# Patient Record
Sex: MALE | Race: WHITE | Employment: OTHER | URBAN - METROPOLITAN AREA
[De-identification: names, ages, dates, MRNs, and addresses within clinical notes are randomized per-mention and may not be internally consistent; named-entity substitution may affect disease eponyms.]

---

## 2018-05-07 ENCOUNTER — HOSPITAL ENCOUNTER (EMERGENCY)
Age: 78
Discharge: HOME | End: 2018-05-08
Payer: MEDICARE

## 2018-05-07 ENCOUNTER — APPOINTMENT (OUTPATIENT)
Dept: CT IMAGING | Age: 78
End: 2018-05-07
Payer: MEDICARE

## 2018-05-07 VITALS
WEIGHT: 182 LBS | TEMPERATURE: 97.8 F | OXYGEN SATURATION: 95 % | DIASTOLIC BLOOD PRESSURE: 87 MMHG | HEART RATE: 99 BPM | BODY MASS INDEX: 26.05 KG/M2 | RESPIRATION RATE: 16 BRPM | SYSTOLIC BLOOD PRESSURE: 118 MMHG | HEIGHT: 70 IN

## 2018-05-07 DIAGNOSIS — M54.32 SCIATICA OF LEFT SIDE: ICD-10-CM

## 2018-05-07 DIAGNOSIS — M51.9 LUMBAR DISC DISEASE: ICD-10-CM

## 2018-05-07 DIAGNOSIS — S39.012A STRAIN OF LUMBAR REGION, INITIAL ENCOUNTER: Primary | ICD-10-CM

## 2018-05-07 PROCEDURE — 96372 THER/PROPH/DIAG INJ SC/IM: CPT

## 2018-05-07 PROCEDURE — 99283 EMERGENCY DEPT VISIT LOW MDM: CPT

## 2018-05-07 PROCEDURE — 72131 CT LUMBAR SPINE W/O DYE: CPT

## 2018-05-07 PROCEDURE — 6360000002 HC RX W HCPCS: Performed by: NURSE PRACTITIONER

## 2018-05-07 RX ORDER — OXYCODONE HYDROCHLORIDE AND ACETAMINOPHEN 5; 325 MG/1; MG/1
1 TABLET ORAL EVERY 6 HOURS PRN
Qty: 10 TABLET | Refills: 0 | Status: SHIPPED | OUTPATIENT
Start: 2018-05-07 | End: 2018-05-10

## 2018-05-07 RX ORDER — ORPHENADRINE CITRATE 30 MG/ML
60 INJECTION INTRAMUSCULAR; INTRAVENOUS ONCE
Status: COMPLETED | OUTPATIENT
Start: 2018-05-07 | End: 2018-05-07

## 2018-05-07 RX ORDER — ORPHENADRINE CITRATE 100 MG/1
100 TABLET, EXTENDED RELEASE ORAL 2 TIMES DAILY
Qty: 20 TABLET | Refills: 0 | Status: SHIPPED | OUTPATIENT
Start: 2018-05-07 | End: 2018-05-17

## 2018-05-07 RX ORDER — NAPROXEN 500 MG/1
500 TABLET ORAL 2 TIMES DAILY PRN
Qty: 28 TABLET | Refills: 0 | Status: SHIPPED | OUTPATIENT
Start: 2018-05-07 | End: 2019-11-19

## 2018-05-07 RX ORDER — PREDNISONE 20 MG/1
TABLET ORAL
Qty: 18 TABLET | Refills: 0 | Status: SHIPPED | OUTPATIENT
Start: 2018-05-07 | End: 2018-05-17

## 2018-05-07 RX ADMIN — HYDROMORPHONE HYDROCHLORIDE 1 MG: 1 INJECTION, SOLUTION INTRAMUSCULAR; INTRAVENOUS; SUBCUTANEOUS at 23:12

## 2018-05-07 RX ADMIN — ORPHENADRINE CITRATE 60 MG: 30 INJECTION INTRAMUSCULAR; INTRAVENOUS at 23:15

## 2018-05-07 ASSESSMENT — PAIN SCALES - GENERAL
PAINLEVEL_OUTOF10: 10
PAINLEVEL_OUTOF10: 6

## 2018-05-07 ASSESSMENT — PAIN DESCRIPTION - ORIENTATION: ORIENTATION: LOWER

## 2018-05-07 ASSESSMENT — PAIN DESCRIPTION - LOCATION: LOCATION: BACK

## 2018-05-07 ASSESSMENT — PAIN DESCRIPTION - PAIN TYPE: TYPE: ACUTE PAIN

## 2019-11-19 ENCOUNTER — HOSPITAL ENCOUNTER (OUTPATIENT)
Age: 79
Discharge: HOME | End: 2019-11-21
Payer: MEDICARE

## 2019-11-19 DIAGNOSIS — E78.01 FAMILIAL HYPERCHOLESTEROLEMIA: ICD-10-CM

## 2019-11-19 DIAGNOSIS — Z12.5 SPECIAL SCREENING FOR MALIGNANT NEOPLASM OF PROSTATE: ICD-10-CM

## 2019-11-19 DIAGNOSIS — R73.9 HYPERGLYCEMIA: ICD-10-CM

## 2019-11-19 DIAGNOSIS — E55.9 VITAMIN D DEFICIENCY: ICD-10-CM

## 2019-11-19 DIAGNOSIS — D50.0 IRON DEFICIENCY ANEMIA SECONDARY TO BLOOD LOSS (CHRONIC): ICD-10-CM

## 2019-11-19 DIAGNOSIS — E03.9 PRIMARY HYPOTHYROIDISM: ICD-10-CM

## 2019-11-19 LAB
ALBUMIN SERPL-MCNC: 4 G/DL (ref 3.5–5.2)
ALP BLD-CCNC: 87 U/L (ref 40–129)
ALT SERPL-CCNC: 14 U/L (ref 0–40)
ANION GAP SERPL CALCULATED.3IONS-SCNC: 12 MMOL/L (ref 7–16)
AST SERPL-CCNC: 17 U/L (ref 0–39)
BILIRUB SERPL-MCNC: 0.9 MG/DL (ref 0–1.2)
BUN BLDV-MCNC: 14 MG/DL (ref 8–23)
CALCIUM SERPL-MCNC: 9.3 MG/DL (ref 8.6–10.2)
CHLORIDE BLD-SCNC: 102 MMOL/L (ref 98–107)
CHOLESTEROL, TOTAL: 206 MG/DL (ref 0–199)
CO2: 27 MMOL/L (ref 22–29)
CREAT SERPL-MCNC: 1.3 MG/DL (ref 0.7–1.2)
GFR AFRICAN AMERICAN: >60
GFR NON-AFRICAN AMERICAN: 53 ML/MIN/1.73
GLUCOSE BLD-MCNC: 66 MG/DL (ref 74–99)
HBA1C MFR BLD: 5.7 % (ref 4–5.6)
HCT VFR BLD CALC: 52.3 % (ref 37–54)
HDLC SERPL-MCNC: 50 MG/DL
HEMOGLOBIN: 16.2 G/DL (ref 12.5–16.5)
LDL CHOLESTEROL CALCULATED: 134 MG/DL (ref 0–99)
MCH RBC QN AUTO: 30.5 PG (ref 26–35)
MCHC RBC AUTO-ENTMCNC: 31 % (ref 32–34.5)
MCV RBC AUTO: 98.3 FL (ref 80–99.9)
PDW BLD-RTO: 13.9 FL (ref 11.5–15)
PLATELET # BLD: 239 E9/L (ref 130–450)
PMV BLD AUTO: 10.4 FL (ref 7–12)
POTASSIUM SERPL-SCNC: 4.6 MMOL/L (ref 3.5–5)
PROSTATE SPECIFIC ANTIGEN: 1.61 NG/ML (ref 0–4)
RBC # BLD: 5.32 E12/L (ref 3.8–5.8)
SODIUM BLD-SCNC: 141 MMOL/L (ref 132–146)
TOTAL PROTEIN: 7.7 G/DL (ref 6.4–8.3)
TRIGL SERPL-MCNC: 108 MG/DL (ref 0–149)
TSH SERPL DL<=0.05 MIU/L-ACNC: 3.51 UIU/ML (ref 0.27–4.2)
VITAMIN D 25-HYDROXY: 57 NG/ML (ref 30–100)
VLDLC SERPL CALC-MCNC: 22 MG/DL
WBC # BLD: 8.7 E9/L (ref 4.5–11.5)

## 2019-11-19 PROCEDURE — 82306 VITAMIN D 25 HYDROXY: CPT

## 2019-11-19 PROCEDURE — 80053 COMPREHEN METABOLIC PANEL: CPT

## 2019-11-19 PROCEDURE — 80061 LIPID PANEL: CPT

## 2019-11-19 PROCEDURE — G0103 PSA SCREENING: HCPCS

## 2019-11-19 PROCEDURE — 84443 ASSAY THYROID STIM HORMONE: CPT

## 2019-11-19 PROCEDURE — 85027 COMPLETE CBC AUTOMATED: CPT

## 2019-11-19 PROCEDURE — 36415 COLL VENOUS BLD VENIPUNCTURE: CPT

## 2019-11-19 PROCEDURE — 83036 HEMOGLOBIN GLYCOSYLATED A1C: CPT

## 2020-02-11 ENCOUNTER — HOSPITAL ENCOUNTER (OUTPATIENT)
Dept: NON INVASIVE DIAGNOSTICS | Age: 80
Discharge: HOME | End: 2020-02-11
Payer: MEDICARE

## 2020-02-11 ENCOUNTER — HOSPITAL ENCOUNTER (OUTPATIENT)
Dept: CT IMAGING | Age: 80
Discharge: HOME | End: 2020-02-11
Payer: MEDICARE

## 2020-02-11 LAB
BUN BLDV-MCNC: 32 MG/DL (ref 8–23)
CREAT SERPL-MCNC: 1.9 MG/DL (ref 0.7–1.2)
GFR AFRICAN AMERICAN: 41
GFR NON-AFRICAN AMERICAN: 34 ML/MIN/1.73
LV EF: 60 %
LVEF MODALITY: NORMAL

## 2020-02-11 PROCEDURE — 71250 CT THORAX DX C-: CPT

## 2020-02-11 PROCEDURE — 93306 TTE W/DOPPLER COMPLETE: CPT

## 2020-02-11 PROCEDURE — 36415 COLL VENOUS BLD VENIPUNCTURE: CPT

## 2020-02-11 PROCEDURE — 84520 ASSAY OF UREA NITROGEN: CPT

## 2020-02-11 PROCEDURE — 82565 ASSAY OF CREATININE: CPT

## 2020-05-14 ENCOUNTER — OFFICE VISIT (OUTPATIENT)
Dept: ORTHOPEDIC SURGERY | Age: 80
End: 2020-05-14
Payer: MEDICARE

## 2020-05-14 VITALS — BODY MASS INDEX: 26.6 KG/M2 | HEIGHT: 71 IN | WEIGHT: 190 LBS | TEMPERATURE: 98 F

## 2020-05-14 PROCEDURE — G8427 DOCREV CUR MEDS BY ELIG CLIN: HCPCS | Performed by: ORTHOPAEDIC SURGERY

## 2020-05-14 PROCEDURE — 99204 OFFICE O/P NEW MOD 45 MIN: CPT | Performed by: ORTHOPAEDIC SURGERY

## 2020-05-14 PROCEDURE — 20610 DRAIN/INJ JOINT/BURSA W/O US: CPT | Performed by: ORTHOPAEDIC SURGERY

## 2020-05-14 PROCEDURE — 4004F PT TOBACCO SCREEN RCVD TLK: CPT | Performed by: ORTHOPAEDIC SURGERY

## 2020-05-14 PROCEDURE — G8417 CALC BMI ABV UP PARAM F/U: HCPCS | Performed by: ORTHOPAEDIC SURGERY

## 2020-05-14 PROCEDURE — 4040F PNEUMOC VAC/ADMIN/RCVD: CPT | Performed by: ORTHOPAEDIC SURGERY

## 2020-05-14 PROCEDURE — 1123F ACP DISCUSS/DSCN MKR DOCD: CPT | Performed by: ORTHOPAEDIC SURGERY

## 2020-05-14 RX ORDER — IPRATROPIUM BROMIDE AND ALBUTEROL SULFATE 2.5; .5 MG/3ML; MG/3ML
SOLUTION RESPIRATORY (INHALATION)
COMMUNITY
Start: 2020-05-05 | End: 2020-08-24 | Stop reason: ALTCHOICE

## 2020-05-14 RX ORDER — TRIAMCINOLONE ACETONIDE 40 MG/ML
40 INJECTION, SUSPENSION INTRA-ARTICULAR; INTRAMUSCULAR ONCE
Status: COMPLETED | OUTPATIENT
Start: 2020-05-14 | End: 2020-05-14

## 2020-05-14 RX ORDER — BUDESONIDE AND FORMOTEROL FUMARATE DIHYDRATE 160; 4.5 UG/1; UG/1
2 AEROSOL RESPIRATORY (INHALATION) 2 TIMES DAILY
COMMUNITY
End: 2020-08-24 | Stop reason: ALTCHOICE

## 2020-05-14 RX ORDER — LISINOPRIL AND HYDROCHLOROTHIAZIDE 20; 12.5 MG/1; MG/1
TABLET ORAL
COMMUNITY
Start: 2020-03-01 | End: 2020-09-01 | Stop reason: SDUPTHER

## 2020-05-14 RX ORDER — ALBUTEROL SULFATE 2.5 MG/3ML
SOLUTION RESPIRATORY (INHALATION)
Status: ON HOLD | COMMUNITY
Start: 2020-02-27 | End: 2022-01-01

## 2020-05-14 RX ADMIN — TRIAMCINOLONE ACETONIDE 40 MG: 40 INJECTION, SUSPENSION INTRA-ARTICULAR; INTRAMUSCULAR at 11:50

## 2020-05-14 NOTE — PROGRESS NOTES
Chief Complaint   Patient presents with    Shoulder Pain     Bilateral Shoulder x 2 years R>L, no known injury, no previous shoulder surgeries        Sarath Harris is a [de-identified]y.o. year old   male who is seen today  for evaluation of bilateral shoulder pain. He reports the pain has been ongoing for the past 2 years. He does not recall a specific injury which started the pain. \.   He reports the pain is worse with overhead movement, better with rest.  The patient does have mechanical symptoms. Hedoes have night pain. He denies a feeling of instability. The prior treatments have been none. The patient   has not responded to the treatment. The patient is right hand dominant. The patient is not working. The patients occupation is retired. Chief Complaint   Patient presents with    Shoulder Pain     Bilateral Shoulder x 2 years R>L, no known injury, no previous shoulder surgeries     No past medical history on file. No past surgical history on file.     Current Outpatient Medications:     ipratropium-albuterol (DUONEB) 0.5-2.5 (3) MG/3ML SOLN nebulizer solution, INHALE 1 UNIT DOSE VIAL 4 TIMES DAILY VIA NEBULIZER MACHINE AS NEEDED FOR COUGHING  WHEEZING OR SHORTNESS OF BREATH, Disp: , Rfl:     lisinopril-hydroCHLOROthiazide (PRINZIDE;ZESTORETIC) 20-12.5 MG per tablet, TAKE ONE TABLET BY MOUTH DAILY, Disp: , Rfl:     albuterol (PROVENTIL) (2.5 MG/3ML) 0.083% nebulizer solution, INHALE THE CONTENTS OF 1 VIAL (3 ML) VIA NEBULIZER 4 TIMES DAILY AS NEEDED WHEN COUGHING  WHEEZING  OR SHORT OF BREATH, Disp: , Rfl:     budesonide-formoterol (SYMBICORT) 160-4.5 MCG/ACT AERO, Inhale 2 puffs into the lungs 2 times daily, Disp: , Rfl:   Allergies   Allergen Reactions    Penicillins      Social History     Socioeconomic History    Marital status:      Spouse name: Not on file    Number of children: Not on file    Years of education: Not on file    Highest education level: Not on file   Occupational

## 2020-05-18 ENCOUNTER — TELEPHONE (OUTPATIENT)
Dept: ORTHOPEDIC SURGERY | Age: 80
End: 2020-05-18

## 2020-07-07 ENCOUNTER — OFFICE VISIT (OUTPATIENT)
Dept: ORTHOPEDIC SURGERY | Age: 80
End: 2020-07-07
Payer: MEDICARE

## 2020-07-07 VITALS — BODY MASS INDEX: 25.62 KG/M2 | HEIGHT: 71 IN | WEIGHT: 183 LBS | TEMPERATURE: 98 F

## 2020-07-07 PROCEDURE — 99213 OFFICE O/P EST LOW 20 MIN: CPT | Performed by: ORTHOPAEDIC SURGERY

## 2020-07-07 PROCEDURE — 4040F PNEUMOC VAC/ADMIN/RCVD: CPT | Performed by: ORTHOPAEDIC SURGERY

## 2020-07-07 PROCEDURE — 1123F ACP DISCUSS/DSCN MKR DOCD: CPT | Performed by: ORTHOPAEDIC SURGERY

## 2020-07-07 PROCEDURE — G8427 DOCREV CUR MEDS BY ELIG CLIN: HCPCS | Performed by: ORTHOPAEDIC SURGERY

## 2020-07-07 PROCEDURE — 1036F TOBACCO NON-USER: CPT | Performed by: ORTHOPAEDIC SURGERY

## 2020-07-07 PROCEDURE — G8417 CALC BMI ABV UP PARAM F/U: HCPCS | Performed by: ORTHOPAEDIC SURGERY

## 2020-07-07 NOTE — PROGRESS NOTES
Chief Complaint   Patient presents with    Shoulder Pain     Right Shoulder F/U, had cortisone injection on 5/14/2020 with good relief. Teresa Huizar is a [de-identified]y.o. year old   male who is seen today  for follow up of his right shoulder. He has pain with over head movement. The cortisone injection lasted 3-4 weeks. The patient is on 2L of O2 during the day. Chief Complaint   Patient presents with    Shoulder Pain     Right Shoulder F/U, had cortisone injection on 5/14/2020 with good relief. No past medical history on file. No past surgical history on file.     Current Outpatient Medications:     lisinopril-hydroCHLOROthiazide (PRINZIDE;ZESTORETIC) 20-12.5 MG per tablet, Take 1 tablet by mouth daily, Disp: 30 tablet, Rfl: 2    ipratropium-albuterol (DUONEB) 0.5-2.5 (3) MG/3ML SOLN nebulizer solution, INHALE 1 UNIT DOSE VIAL 4 TIMES DAILY VIA NEBULIZER MACHINE AS NEEDED FOR COUGHING  WHEEZING OR SHORTNESS OF BREATH, Disp: , Rfl:     lisinopril-hydroCHLOROthiazide (PRINZIDE;ZESTORETIC) 20-12.5 MG per tablet, TAKE ONE TABLET BY MOUTH DAILY, Disp: , Rfl:     albuterol (PROVENTIL) (2.5 MG/3ML) 0.083% nebulizer solution, INHALE THE CONTENTS OF 1 VIAL (3 ML) VIA NEBULIZER 4 TIMES DAILY AS NEEDED WHEN COUGHING  WHEEZING  OR SHORT OF BREATH, Disp: , Rfl:     budesonide-formoterol (SYMBICORT) 160-4.5 MCG/ACT AERO, Inhale 2 puffs into the lungs 2 times daily, Disp: , Rfl:     albuterol sulfate  (90 Base) MCG/ACT inhaler, Inhale 2 puffs into the lungs 4 times daily as needed for Wheezing, Disp: 1 Inhaler, Rfl: 5    budesonide-formoterol (SYMBICORT) 160-4.5 MCG/ACT AERO, Inhale 2 puffs into the lungs 2 times daily, Disp: 3 Inhaler, Rfl: 1    tiotropium (SPIRIVA HANDIHALER) 18 MCG inhalation capsule, Inhale 1 capsule into the lungs daily, Disp: 30 capsule, Rfl: 2  Allergies   Allergen Reactions    Penicillins Hives     Long ago     Social History     Socioeconomic History    Marital status:      Spouse name: Not on file    Number of children: Not on file    Years of education: Not on file    Highest education level: Not on file   Occupational History    Not on file   Social Needs    Financial resource strain: Not on file    Food insecurity     Worry: Not on file     Inability: Not on file    Transportation needs     Medical: Not on file     Non-medical: Not on file   Tobacco Use    Smoking status: Former Smoker     Last attempt to quit: 11/19/2016     Years since quitting: 3.6    Smokeless tobacco: Never Used   Substance and Sexual Activity    Alcohol use: No    Drug use: No    Sexual activity: Not on file   Lifestyle    Physical activity     Days per week: Not on file     Minutes per session: Not on file    Stress: Not on file   Relationships    Social connections     Talks on phone: Not on file     Gets together: Not on file     Attends Orthodox service: Not on file     Active member of club or organization: Not on file     Attends meetings of clubs or organizations: Not on file     Relationship status: Not on file    Intimate partner violence     Fear of current or ex partner: Not on file     Emotionally abused: Not on file     Physically abused: Not on file     Forced sexual activity: Not on file   Other Topics Concern    Not on file   Social History Narrative    Not on file     No family history on file. REVIEW OF SYSTEMS:     General/Constitution:  (-)weight loss, (-)fever, (-)chills, (-)weakness. Skin: (-) rash,(-) psoriasis,(-) eczema, (-)skin cancer. Musculoskeletal: (-) fractures,  (-) dislocations,(-) collagen vascular disease, (-) fibromyalgia, (-) multiple sclerosis, (-) muscular dystrophy, (-) RSD,(-) joint pain (-)swelling, (-) joint pain,swelling. Neurologic: (-) epilepsy, (-)seizures,(-) brain tumor,(-) TIA, (-)stroke, (-)headaches, (-)Parkinson disease,(-) memory loss, (-) LOC.   Cardiovascular: (-) Chest pain, (-) swelling in legs/feet, (-) SOB, (-) cramping in legs/feet with walking. Respiratory: (-) SOB, (-) Coughing, (-) night sweats. GI: (-) nausea, (-) vomiting, (-) diarrhea, (-) blood in stool, (-) gastric ulcer. Psychiatric: (-) Depression, (-) Anxiety, (-) bipolar disease, (-) Alzheimer's Disease  Allergic/Immunologic: (-) allergies latex, (-) allergies metal, (-) skin sensitivity. Hematlogic: (-) anemia, (-) blood transfusion, (-) DVT/PE, (-) Clotting disorders      Subjective:    Constitution:  Temp 98 °F (36.7 °C)   Ht 5' 10.5\" (1.791 m)   Wt 183 lb (83 kg)   BMI 25.89 kg/m²     Psycihatric:  The patient is alert and oriented x 3, appears to be stated age and in no distress. Respiratory:  Respiratory effort is not labored. Patient is not gasping. Palpation of the chest reveals no tactile fremitus. Skin:  Upon inspection: the skin appears warm, dry and intact. There is not a previous scar over the affected area. There is not any cellulitis, lymphedema or cutaneous lesions noted in the lower extremities. Upon palpation there is no induration noted. Neurologic:  Motor exam of the upper extremities show: The reflexes in biceps/triceps/brachioradialis are equal and symmetric. Sensory exam C5-T1 are normal bilaterally. Cardiovascular: The vascular exam is normal and is well perfused to distal extremities. There are 2+ radial pulses bilaterally, and motor and sensation is intact to median, ulnar, and radial, musclocutaneus, and axillary nerve distribution and grossly symmetric bilaterally. There is cap refill noted less than two seconds in all digits. There is not edema of the bilateral upper extremities. There is not varicosities noted in the distal extremities. Lymph:  Upon palpation,  there is no lymphadenopathy noted in bilateral upper extremities. Musculoskeletal:  Gait: normal; examination of the nails and digits reveal no cyanosis or clubbing.       Cervical Exam:  On physical exam, Arevalo Fix is well-developed, well-nourished, oriented to person, place and time. his gait is normal.  On evaluation of hiscervical spine, He has full range of motion of the cervical spine without pain. There is no cervical tenderness to palpation. Shoulder Exam:  On evaluation of his bilaterally upper extremities, his bilateral shoulder has no deformity. There is tenderness upon palpation of the lateral shoulder. There is not evidence of scapular dyskinesis. There is not muscle atrophy in shoulder girdle. The range of motion for the Right Shoulder is 130/35/t10 and for the Left shoulder is 130/30/t10. Right shoulder Motor strength is 5/5 in the supraspinatus, 5/5 internal rotation and 5/5 in external rotation, and Left shoulder motor strength 5/5 in supraspinatus, 5/5 in internal rotation, 5/5 in external rotation. Right shoulder:  positive Impingement , positive Meraz ,negative  Speeds,negative  Apprehension ,negative Sheehan Load Shift, negative Nhi manuver, negative Cross arm test.     Left shoulder:  positiveImpingement , positive Meraz ,negative  Speeds,negative  Apprehension ,negative Sheehan Load Shift, negative Nhi manuver, negative Cross arm test.     XRAY:  Mild glenohumeral arhtritis    MRI:  Patient will bring disc to office for review. Radiographic findings reviewed with patient    Impression:   Encounter Diagnoses   Name Primary?  Shoulder impingement, right Yes    Glenohumeral arthritis, left        Plan:   The patient had an MRI performed but the patient did not bring the images and they state he has a rotator cuff tear. He states his pain is down to a 5/10 daily from a 9/10. I would like to review the imaging and the report before we decide on treatment.

## 2020-08-17 ENCOUNTER — OFFICE VISIT (OUTPATIENT)
Dept: ORTHOPEDIC SURGERY | Age: 80
End: 2020-08-17
Payer: MEDICARE

## 2020-08-17 VITALS — WEIGHT: 183 LBS | TEMPERATURE: 98 F | BODY MASS INDEX: 25.62 KG/M2 | HEIGHT: 71 IN

## 2020-08-17 PROBLEM — M75.41 SHOULDER IMPINGEMENT, RIGHT: Status: ACTIVE | Noted: 2020-08-17

## 2020-08-17 PROBLEM — M25.811 SHOULDER IMPINGEMENT, RIGHT: Status: ACTIVE | Noted: 2020-08-17

## 2020-08-17 PROBLEM — M75.121 NONTRAUMATIC COMPLETE TEAR OF RIGHT ROTATOR CUFF: Status: ACTIVE | Noted: 2020-08-17

## 2020-08-17 PROCEDURE — 4040F PNEUMOC VAC/ADMIN/RCVD: CPT | Performed by: ORTHOPAEDIC SURGERY

## 2020-08-17 PROCEDURE — G8427 DOCREV CUR MEDS BY ELIG CLIN: HCPCS | Performed by: ORTHOPAEDIC SURGERY

## 2020-08-17 PROCEDURE — G8417 CALC BMI ABV UP PARAM F/U: HCPCS | Performed by: ORTHOPAEDIC SURGERY

## 2020-08-17 PROCEDURE — 99214 OFFICE O/P EST MOD 30 MIN: CPT | Performed by: ORTHOPAEDIC SURGERY

## 2020-08-17 PROCEDURE — 1036F TOBACCO NON-USER: CPT | Performed by: ORTHOPAEDIC SURGERY

## 2020-08-17 PROCEDURE — 1123F ACP DISCUSS/DSCN MKR DOCD: CPT | Performed by: ORTHOPAEDIC SURGERY

## 2020-08-17 NOTE — PROGRESS NOTES
Chief Complaint   Patient presents with    Shoulder Pain     Right Shoulder MRI F/U        Kristin Luu is a [de-identified]y.o. year old   male who is seen today  for follow up of his right shoulder. He has pain with over head movement. The cortisone injection lasted 3-4 weeks. The patient is on 2L of O2 during the day. Chief Complaint   Patient presents with    Shoulder Pain     Right Shoulder MRI F/U     History reviewed. No pertinent past medical history. History reviewed. No pertinent surgical history.     Current Outpatient Medications:     lisinopril-hydroCHLOROthiazide (PRINZIDE;ZESTORETIC) 20-12.5 MG per tablet, Take 1 tablet by mouth daily, Disp: 30 tablet, Rfl: 2    ipratropium-albuterol (DUONEB) 0.5-2.5 (3) MG/3ML SOLN nebulizer solution, INHALE 1 UNIT DOSE VIAL 4 TIMES DAILY VIA NEBULIZER MACHINE AS NEEDED FOR COUGHING  WHEEZING OR SHORTNESS OF BREATH, Disp: , Rfl:     lisinopril-hydroCHLOROthiazide (PRINZIDE;ZESTORETIC) 20-12.5 MG per tablet, TAKE ONE TABLET BY MOUTH DAILY, Disp: , Rfl:     albuterol (PROVENTIL) (2.5 MG/3ML) 0.083% nebulizer solution, INHALE THE CONTENTS OF 1 VIAL (3 ML) VIA NEBULIZER 4 TIMES DAILY AS NEEDED WHEN COUGHING  WHEEZING  OR SHORT OF BREATH, Disp: , Rfl:     budesonide-formoterol (SYMBICORT) 160-4.5 MCG/ACT AERO, Inhale 2 puffs into the lungs 2 times daily, Disp: , Rfl:     albuterol sulfate  (90 Base) MCG/ACT inhaler, Inhale 2 puffs into the lungs 4 times daily as needed for Wheezing, Disp: 1 Inhaler, Rfl: 5    budesonide-formoterol (SYMBICORT) 160-4.5 MCG/ACT AERO, Inhale 2 puffs into the lungs 2 times daily, Disp: 3 Inhaler, Rfl: 1    tiotropium (SPIRIVA HANDIHALER) 18 MCG inhalation capsule, Inhale 1 capsule into the lungs daily, Disp: 30 capsule, Rfl: 2  Allergies   Allergen Reactions    Penicillins Hives     Long ago     Social History     Socioeconomic History    Marital status:      Spouse name: Not on file    Number of children: Not on file    Years of education: Not on file    Highest education level: Not on file   Occupational History    Not on file   Social Needs    Financial resource strain: Not on file    Food insecurity     Worry: Not on file     Inability: Not on file    Transportation needs     Medical: Not on file     Non-medical: Not on file   Tobacco Use    Smoking status: Former Smoker     Last attempt to quit: 11/19/2016     Years since quitting: 3.7    Smokeless tobacco: Never Used   Substance and Sexual Activity    Alcohol use: No    Drug use: No    Sexual activity: Not on file   Lifestyle    Physical activity     Days per week: Not on file     Minutes per session: Not on file    Stress: Not on file   Relationships    Social connections     Talks on phone: Not on file     Gets together: Not on file     Attends Anabaptism service: Not on file     Active member of club or organization: Not on file     Attends meetings of clubs or organizations: Not on file     Relationship status: Not on file    Intimate partner violence     Fear of current or ex partner: Not on file     Emotionally abused: Not on file     Physically abused: Not on file     Forced sexual activity: Not on file   Other Topics Concern    Not on file   Social History Narrative    Not on file     No family history on file. REVIEW OF SYSTEMS:     General/Constitution:  (-)weight loss, (-)fever, (-)chills, (-)weakness. Skin: (-) rash,(-) psoriasis,(-) eczema, (-)skin cancer. Musculoskeletal: (-) fractures,  (-) dislocations,(-) collagen vascular disease, (-) fibromyalgia, (-) multiple sclerosis, (-) muscular dystrophy, (-) RSD,(-) joint pain (-)swelling, (-) joint pain,swelling. Neurologic: (-) epilepsy, (-)seizures,(-) brain tumor,(-) TIA, (-)stroke, (-)headaches, (-)Parkinson disease,(-) memory loss, (-) LOC. Cardiovascular: (-) Chest pain, (-) swelling in legs/feet, (-) SOB, (-) cramping in legs/feet with walking.   Respiratory: (-) SOB, (-) Coughing, (-) night sweats. GI: (-) nausea, (-) vomiting, (-) diarrhea, (-) blood in stool, (-) gastric ulcer. Psychiatric: (-) Depression, (-) Anxiety, (-) bipolar disease, (-) Alzheimer's Disease  Allergic/Immunologic: (-) allergies latex, (-) allergies metal, (-) skin sensitivity. Hematlogic: (-) anemia, (-) blood transfusion, (-) DVT/PE, (-) Clotting disorders      Subjective:    Constitution:  Temp 98 °F (36.7 °C)   Ht 5' 10.5\" (1.791 m)   Wt 183 lb (83 kg)   BMI 25.89 kg/m²     Psycihatric:  The patient is alert and oriented x 3, appears to be stated age and in no distress. Respiratory:  Respiratory effort is not labored. Patient is not gasping. Palpation of the chest reveals no tactile fremitus. Skin:  Upon inspection: the skin appears warm, dry and intact. There is not a previous scar over the affected area. There is not any cellulitis, lymphedema or cutaneous lesions noted in the lower extremities. Upon palpation there is no induration noted. Neurologic:  Motor exam of the upper extremities show: The reflexes in biceps/triceps/brachioradialis are equal and symmetric. Sensory exam C5-T1 are normal bilaterally. Cardiovascular: The vascular exam is normal and is well perfused to distal extremities. There are 2+ radial pulses bilaterally, and motor and sensation is intact to median, ulnar, and radial, musclocutaneus, and axillary nerve distribution and grossly symmetric bilaterally. There is cap refill noted less than two seconds in all digits. There is not edema of the bilateral upper extremities. There is not varicosities noted in the distal extremities. Lymph:  Upon palpation,  there is no lymphadenopathy noted in bilateral upper extremities. Musculoskeletal:  Gait: normal; examination of the nails and digits reveal no cyanosis or clubbing. Cervical Exam:  On physical exam, Jori Cota is well-developed, well-nourished, oriented to person, place and time.   his gait is normal.  On evaluation of hiscervical spine, He has full range of motion of the cervical spine without pain. There is no cervical tenderness to palpation. Shoulder Exam:  On evaluation of his bilaterally upper extremities, his bilateral shoulder has no deformity. There is tenderness upon palpation of the lateral shoulder. There is not evidence of scapular dyskinesis. There is not muscle atrophy in shoulder girdle. The range of motion for the Right Shoulder is 130/35/t10 and for the Left shoulder is 130/30/t10. Right shoulder Motor strength is 5/5 in the supraspinatus, 5/5 internal rotation and 5/5 in external rotation, and Left shoulder motor strength 5/5 in supraspinatus, 5/5 in internal rotation, 5/5 in external rotation. Right shoulder:  positive Impingement , positive Meraz ,negative  Speeds,negative  Apprehension ,negative Sheehan Load Shift, negative Nhi manuver, negative Cross arm test.     Left shoulder:  positiveImpingement , positive Meraz ,negative  Speeds,negative  Apprehension ,negative Sheehan Load Shift, negative Nhi manuver, negative Cross arm test.     XRAY:  Mild glenohumeral arhtritis    MRI:  1. Focal high-grade articular sided tear of the posterior fibers of the supraspinatus tendon and <BR>moderate tendinopathy/interstitial tearing of the infraspinatus tendon. <BR>2. Slight heterogeneity along the superior labrum, suggestive of degenerative fraying. <BR>3. Mild acromioclavicular joint arthrosis. <BR>4. Mild nonspecific heterogeneous marrow edema within the proximal femur. No discrete marrow <BR>replacing lesion is identified. Radiographic findings reviewed with patient    Impression:   Encounter Diagnoses   Name Primary?  Shoulder impingement, right Yes    Nontraumatic complete tear of right rotator cuff        Plan:   I had a lengthy discussion with the patient regarding their diagnosis.  I explained treatment options including surgical vs non surgical treatment. I reviewed in detail the risks and benefits and outlined the procedure in detail with expected outcomes and possible complications. I also discussed non surgical treatment such as injections (CSI and visco supplementation), physical therapy, topical creams and NSAID's. Patient will talk with pulmonologist and PCP regarding surgical intervention  Patient is hesitant about surgery and will call our office if he would like to proceed with treatment, will need to be done on Wednesday. I discussed the rtc will not heal on its own  At least 25 minutes was spent discussing the diagnosis and treatment options with the patient with at least 50% of the time was spent with decision making and counseling the patient.

## 2020-08-24 ENCOUNTER — OFFICE VISIT (OUTPATIENT)
Dept: ENT CLINIC | Age: 80
End: 2020-08-24
Payer: MEDICARE

## 2020-08-24 VITALS
BODY MASS INDEX: 26.2 KG/M2 | HEIGHT: 70 IN | WEIGHT: 183 LBS | TEMPERATURE: 97.5 F | SYSTOLIC BLOOD PRESSURE: 116 MMHG | DIASTOLIC BLOOD PRESSURE: 68 MMHG

## 2020-08-24 PROCEDURE — 69210 REMOVE IMPACTED EAR WAX UNI: CPT | Performed by: NURSE PRACTITIONER

## 2020-08-24 PROCEDURE — 99203 OFFICE O/P NEW LOW 30 MIN: CPT | Performed by: NURSE PRACTITIONER

## 2020-08-24 RX ORDER — FLUTICASONE FUROATE, UMECLIDINIUM BROMIDE AND VILANTEROL TRIFENATATE 100; 62.5; 25 UG/1; UG/1; UG/1
1 POWDER RESPIRATORY (INHALATION) DAILY
COMMUNITY
End: 2021-01-01

## 2020-08-24 RX ORDER — ASPIRIN 81 MG/1
81 TABLET ORAL DAILY
COMMUNITY
End: 2022-01-01

## 2020-08-24 ASSESSMENT — ENCOUNTER SYMPTOMS
RESPIRATORY NEGATIVE: 1
EYES NEGATIVE: 1
GASTROINTESTINAL NEGATIVE: 1
ABDOMINAL PAIN: 0
STRIDOR: 0
SHORTNESS OF BREATH: 0

## 2020-08-24 NOTE — PROGRESS NOTES
Psychiatric:         Mood and Affect: Mood normal.         Behavior: Behavior normal.         Thought Content: Thought content normal.         Judgment: Judgment normal.         IMPRESSION/PLAN:  Cerumen removal     Auditory canal(s) both ears completely obstructed with cerumen. Cerumen was gently removed using soft plastic curette. The both ears side couldn't be completely cleaned due to patient intolerance. The visible tympanic membrane is intact following the procedure. Auditory canals appear normal.         Jayden Diaz was seen today for new patient, hearing problem, ear problem and cerumen impaction. Diagnoses and all orders for this visit:    Bilateral impacted cerumen  -     NM REMOVAL IMPACTED CERUMEN INSTRUMENTATION Brooklynn Villa is seen today for 1 year history of increasing hearing loss with a history of cerumen impactions. 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 Upon exam he is found to have complete impactions of bilateral ear canals. Moderate amount of cerumen was removed from bilateral ear canals with complete removal unable to be obtained due to patient intolerance. At this time he is instructed to use Debrox in both ears daily for the next week at which time he will have a follow-up appointment. Once the cerumen impactions are removed he will undergo full audio exam.  Instructed to call with any new or worsening symptoms prior to his next appointment.     Kelsea Chambers, TRICIA, FNP-C  [address] [place] [address], Nose and Throat    The information contained in this note has been dictated using drug and medical speech recognition software and may contain errors

## 2020-08-31 ENCOUNTER — OFFICE VISIT (OUTPATIENT)
Dept: ENT CLINIC | Age: 80
End: 2020-08-31
Payer: MEDICARE

## 2020-08-31 ENCOUNTER — PROCEDURE VISIT (OUTPATIENT)
Dept: AUDIOLOGY | Age: 80
End: 2020-08-31
Payer: MEDICARE

## 2020-08-31 VITALS
HEIGHT: 70 IN | SYSTOLIC BLOOD PRESSURE: 110 MMHG | HEART RATE: 76 BPM | WEIGHT: 183 LBS | DIASTOLIC BLOOD PRESSURE: 64 MMHG | BODY MASS INDEX: 26.2 KG/M2

## 2020-08-31 PROCEDURE — 92557 COMPREHENSIVE HEARING TEST: CPT | Performed by: AUDIOLOGIST

## 2020-08-31 PROCEDURE — 92567 TYMPANOMETRY: CPT | Performed by: AUDIOLOGIST

## 2020-08-31 PROCEDURE — 99213 OFFICE O/P EST LOW 20 MIN: CPT | Performed by: NURSE PRACTITIONER

## 2020-08-31 PROCEDURE — 69210 REMOVE IMPACTED EAR WAX UNI: CPT | Performed by: NURSE PRACTITIONER

## 2020-08-31 RX ORDER — FLUTICASONE PROPIONATE 50 MCG
2 SPRAY, SUSPENSION (ML) NASAL DAILY
Qty: 2 BOTTLE | Refills: 2 | Status: SHIPPED
Start: 2020-08-31 | End: 2020-12-21 | Stop reason: SDUPTHER

## 2020-08-31 ASSESSMENT — ENCOUNTER SYMPTOMS
EYES NEGATIVE: 1
SHORTNESS OF BREATH: 0
RESPIRATORY NEGATIVE: 1
STRIDOR: 0
ABDOMINAL PAIN: 0
RHINORRHEA: 1
GASTROINTESTINAL NEGATIVE: 1

## 2020-08-31 NOTE — PROGRESS NOTES
[facility] Otolaryngology  Dr. Anna Alvarado. Fabrice Patino. Ms.Ed        Patient Name:  Agnes Desouza  :  1940     CHIEF C/O:    Chief Complaint   Patient presents with    Ear Problem     both ears rechecking right ear is worse       HISTORY OBTAINED FROM:  Patient, spouse    HISTORY OF PRESENT ILLNESS:       Ishan Barahona is a [de-identified]y.o. year old male, here today for follow up of cerumen impactions and for hearing evaluation. He was seen 1 week ago and instructed to use Debrox daily for the past week prior to recheck of bilateral ears. He denies any new symptoms. He still complains of decreased hearing bilaterally, with the left ear feeling more plugged than the right at this time. He does also complain of chronic rhinorrhea and postnasal drainage without significant congestion or sinus pressure. Past Medical History:   Diagnosis Date    COPD (chronic obstructive pulmonary disease) ([facility] [address]     Hypertension      History reviewed. No pertinent surgical history.     Current Outpatient Medications:     fluticasone (FLONASE) 50 MCG/ACT nasal spray, 2 sprays by Each Nostril route daily, Disp: 2 Bottle, Rfl: 2    aspirin 81 MG EC tablet, Take 81 mg by mouth daily, Disp: , Rfl:     fluticasone-umeclidin-vilant (TRELEGY ELLIPTA) 100-62.5-25 MCG/INH AEPB, Inhale 1 puff into the lungs daily, Disp: , Rfl:     lisinopril-hydroCHLOROthiazide (PRINZIDE;ZESTORETIC) 20-12.5 MG per tablet, TAKE ONE TABLET BY MOUTH DAILY, Disp: , Rfl:     albuterol (PROVENTIL) (2.5 MG/3ML) 0.083% nebulizer solution, INHALE THE CONTENTS OF 1 VIAL (3 ML) VIA NEBULIZER 4 TIMES DAILY AS NEEDED WHEN COUGHING  WHEEZING  OR SHORT OF BREATH, Disp: , Rfl:     albuterol sulfate  (90 Base) MCG/ACT inhaler, Inhale 2 puffs into the lungs 4 times daily as needed for Wheezing, Disp: 1 Inhaler, Rfl: 5  Penicillins  Social History     Tobacco Use    Smoking status: Former Smoker     Last attempt to quit: 2016     Years since quitting: 3.7    Smokeless tobacco: Never Used   Substance Use Topics    Alcohol use: No    Drug use: No     History reviewed. No pertinent family history. Review of Systems   Constitutional: Negative. Negative for activity change and appetite change. HENT: Positive for hearing loss, postnasal drip and rhinorrhea. Negative for congestion, ear discharge and ear pain. Eyes: Negative. Respiratory: Negative. Negative for shortness of breath and stridor. Cardiovascular: Negative. Negative for chest pain and palpitations. Gastrointestinal: Negative. Negative for abdominal pain. Endocrine: Negative. Genitourinary: Negative. Musculoskeletal: Negative. Skin: Negative. Neurological: Negative. Negative for dizziness. Hematological: Negative. Psychiatric/Behavioral: Negative. /64 (Site: Left Upper Arm, Position: Sitting, Cuff Size: Medium Adult)   Pulse 76   Ht 5' 10\" (1.778 m)   Wt 183 lb (83 kg)   BMI 26.26 kg/m²   Physical Exam  Constitutional:       Appearance: Normal appearance. HENT:      Head: Normocephalic. Right Ear: Ear canal normal. There is impacted cerumen. Left Ear: Ear canal and external ear normal. There is impacted cerumen. Ears:        Comments: Bilateral TMs remain partially obstructed by cerumen. Patient unable to tolerate complete cleaning. Nose: Rhinorrhea present. Right Turbinates: Swollen and pale. Left Turbinates: Swollen and pale. Mouth/Throat:      Lips: Pink. Mouth: Mucous membranes are moist.      Pharynx: Oropharynx is clear. Eyes:      Conjunctiva/sclera: Conjunctivae normal.      Pupils: Pupils are equal, round, and reactive to light. Neck:      Musculoskeletal: Normal range of motion. No neck rigidity or muscular tenderness. Cardiovascular:      Rate and Rhythm: Normal rate and regular rhythm. Pulses: Normal pulses. Pulmonary:      Effort: Pulmonary effort is normal. No respiratory distress.       Breath sounds: Normal breath sounds. No stridor. Abdominal:      Palpations: Abdomen is soft. Tenderness: There is no abdominal tenderness. Musculoskeletal: Normal range of motion. Skin:     General: Skin is warm and dry. Neurological:      General: No focal deficit present. Mental Status: He is alert and oriented to person, place, and time. Psychiatric:         Mood and Affect: Mood normal.         Behavior: Behavior normal.         Thought Content: Thought content normal.         Judgment: Judgment normal.       Audiogram and tympanogram reviewed with patient. Audiogram reveals 25 dB hearing loss in the right ear with 80% discrimination at 65 dB, 30 dB hearing loss in the left ear with 84% discrimination at 70 dB. Tympanogram reveals flat curves bilaterally. IMPRESSION/PLAN:  Cerumen removal     Auditory canal(s) both ears completely obstructed with cerumen. Cerumen was gently removed using soft plastic curette. The both ears side couldn't be completely cleaned due to patient intolerance. The visible tympanic membrane is intact following the procedure. Auditory canals appear normal.     Glynn Thorne was seen today for ear problem. Diagnoses and all orders for this visit:    Dysfunction of both eustachian tubes    Sensorineural hearing loss (SNHL) of both ears    Bilateral impacted cerumen    Allergic rhinitis, unspecified seasonality, unspecified trigger    Other orders  -     fluticasone (FLONASE) 50 MCG/ACT nasal spray; 2 sprays by Each Nostril route daily      Glynn Thorne is seen today for 1 week follow-up of cerumen impactions and for audio exam.  At his previous appointment he was instructed to use Debrox daily in bilateral ears to prepare for today. Attempt made to remove cerumen from both ears successful however due to patient intolerance complete cleaning was not achieved bilaterally. Cerumen remains on bilateral TMs obstructing view. Tympanogram reveals flat curves bilaterally.   Audiogram

## 2020-09-02 ENCOUNTER — HOSPITAL ENCOUNTER (OUTPATIENT)
Age: 80
Discharge: HOME | End: 2020-09-04
Payer: MEDICARE

## 2020-09-02 LAB
ALBUMIN SERPL-MCNC: 4 G/DL (ref 3.5–5.2)
ALP BLD-CCNC: 66 U/L (ref 40–129)
ALT SERPL-CCNC: 14 U/L (ref 0–40)
ANION GAP SERPL CALCULATED.3IONS-SCNC: 14 MMOL/L (ref 7–16)
AST SERPL-CCNC: 15 U/L (ref 0–39)
BILIRUB SERPL-MCNC: 0.8 MG/DL (ref 0–1.2)
BUN BLDV-MCNC: 42 MG/DL (ref 8–23)
CALCIUM SERPL-MCNC: 9.5 MG/DL (ref 8.6–10.2)
CHLORIDE BLD-SCNC: 104 MMOL/L (ref 98–107)
CO2: 22 MMOL/L (ref 22–29)
CREAT SERPL-MCNC: 2.6 MG/DL (ref 0.7–1.2)
GFR AFRICAN AMERICAN: 29
GFR NON-AFRICAN AMERICAN: 24 ML/MIN/1.73
GLUCOSE BLD-MCNC: 94 MG/DL (ref 74–99)
HBA1C MFR BLD: 5.7 % (ref 4–5.6)
HCT VFR BLD CALC: 45.8 % (ref 37–54)
HEMOGLOBIN: 14.4 G/DL (ref 12.5–16.5)
MCH RBC QN AUTO: 31.7 PG (ref 26–35)
MCHC RBC AUTO-ENTMCNC: 31.4 % (ref 32–34.5)
MCV RBC AUTO: 100.9 FL (ref 80–99.9)
PDW BLD-RTO: 14.6 FL (ref 11.5–15)
PLATELET # BLD: 248 E9/L (ref 130–450)
PMV BLD AUTO: 10.4 FL (ref 7–12)
POTASSIUM SERPL-SCNC: 5.2 MMOL/L (ref 3.5–5)
RBC # BLD: 4.54 E12/L (ref 3.8–5.8)
SODIUM BLD-SCNC: 140 MMOL/L (ref 132–146)
TOTAL PROTEIN: 7 G/DL (ref 6.4–8.3)
WBC # BLD: 8.5 E9/L (ref 4.5–11.5)

## 2020-09-02 PROCEDURE — 85027 COMPLETE CBC AUTOMATED: CPT

## 2020-09-02 PROCEDURE — 80053 COMPREHEN METABOLIC PANEL: CPT

## 2020-09-02 PROCEDURE — 36415 COLL VENOUS BLD VENIPUNCTURE: CPT

## 2020-09-02 PROCEDURE — 83036 HEMOGLOBIN GLYCOSYLATED A1C: CPT

## 2020-09-04 ENCOUNTER — HOSPITAL ENCOUNTER (OUTPATIENT)
Age: 80
Discharge: HOME | End: 2020-09-06
Payer: MEDICARE

## 2020-09-04 PROCEDURE — U0003 INFECTIOUS AGENT DETECTION BY NUCLEIC ACID (DNA OR RNA); SEVERE ACUTE RESPIRATORY SYNDROME CORONAVIRUS 2 (SARS-COV-2) (CORONAVIRUS DISEASE [COVID-19]), AMPLIFIED PROBE TECHNIQUE, MAKING USE OF HIGH THROUGHPUT TECHNOLOGIES AS DESCRIBED BY CMS-2020-01-R: HCPCS

## 2020-09-08 ENCOUNTER — ANESTHESIA EVENT (OUTPATIENT)
Dept: OPERATING ROOM | Age: 80
End: 2020-09-08
Payer: MEDICARE

## 2020-09-08 RX ORDER — CHOLECALCIFEROL (VITAMIN D3) 125 MCG
2 CAPSULE ORAL DAILY
COMMUNITY

## 2020-09-08 NOTE — PROGRESS NOTES
[facility]                                                                                                                    PRE OP INSTRUCTIONS FOR  Lion Candelario        Date: 9/8/2020    Date of surgery:  9/9/2020    Arrival Time: Hospital will call you between 5pm and 7pm with your final arrival time for surgery    1. Do not eat or drink anything after     Midnight    prior to surgery. This includes no water, chewing gum, mints or ice chips. 2. Take the following medications with a small sip of water on the morning of Surgery: use nebulizer dos use inhalers and bring albuterol inh     3. Diabetics may take evening dose of insulin but none after midnight. If you feel symptomatic or low blood sugar morning of surgery drink 1-2 ounces of apple juice only. 4. Aspirin, Ibuprofen, Advil, Naproxen, Vitamin E and other Anti-inflammatory products should be stopped  before surgery  as directed by your physician. Take Tylenol only unless instructed otherwise by your surgeon. 5. Check with your Doctor regarding stopping Plavix, Coumadin, Lovenox, Eliquis, Effient, or other blood thinners. 6. Do not smoke,use illicit drugs and do not drink any alcoholic beverages 24 hours prior to surgery. 7. You may brush your teeth the morning of surgery. DO NOT SWALLOW WATER    8. You MUST make arrangements for a responsible adult to take you home after your surgery. You will not be allowed to leave alone or drive yourself home. It is strongly suggested someone stay with you the first 24 hrs. Your surgery will be cancelled if you do not have a ride home. 9. PEDIATRIC PATIENTS ONLY:  A parent/legal guardian must accompany a child scheduled for surgery and plan to stay at the hospital until the child is discharged. Please do not bring other children with you.     10. Please wear simple, loose fitting clothing to the hospital.  Do not bring valuables (money, credit cards, checkbooks, etc.) Do

## 2020-09-09 ENCOUNTER — ANESTHESIA (OUTPATIENT)
Dept: OPERATING ROOM | Age: 80
End: 2020-09-09
Payer: MEDICARE

## 2020-09-09 ENCOUNTER — HOSPITAL ENCOUNTER (OUTPATIENT)
Age: 80
Setting detail: OUTPATIENT SURGERY
Discharge: HOME | End: 2020-09-09
Attending: ORTHOPAEDIC SURGERY | Admitting: ORTHOPAEDIC SURGERY
Payer: MEDICARE

## 2020-09-09 VITALS
SYSTOLIC BLOOD PRESSURE: 140 MMHG | HEART RATE: 61 BPM | DIASTOLIC BLOOD PRESSURE: 69 MMHG | TEMPERATURE: 96.5 F | WEIGHT: 182 LBS | OXYGEN SATURATION: 95 % | HEIGHT: 70 IN | BODY MASS INDEX: 26.05 KG/M2 | RESPIRATION RATE: 18 BRPM

## 2020-09-09 VITALS
OXYGEN SATURATION: 98 % | RESPIRATION RATE: 8 BRPM | SYSTOLIC BLOOD PRESSURE: 137 MMHG | DIASTOLIC BLOOD PRESSURE: 79 MMHG

## 2020-09-09 LAB
SARS-COV-2, NAAT: NOT DETECTED
SARS-COV-2: NOT DETECTED
SOURCE: NORMAL

## 2020-09-09 PROCEDURE — 2709999900 HC NON-CHARGEABLE SUPPLY: Performed by: ORTHOPAEDIC SURGERY

## 2020-09-09 PROCEDURE — 2500000003 HC RX 250 WO HCPCS: Performed by: ORTHOPAEDIC SURGERY

## 2020-09-09 PROCEDURE — 7100000010 HC PHASE II RECOVERY - FIRST 15 MIN: Performed by: ORTHOPAEDIC SURGERY

## 2020-09-09 PROCEDURE — 7100000000 HC PACU RECOVERY - FIRST 15 MIN: Performed by: ORTHOPAEDIC SURGERY

## 2020-09-09 PROCEDURE — 6360000002 HC RX W HCPCS: Performed by: ANESTHESIOLOGY

## 2020-09-09 PROCEDURE — 2580000003 HC RX 258: Performed by: ORTHOPAEDIC SURGERY

## 2020-09-09 PROCEDURE — 6360000002 HC RX W HCPCS: Performed by: ORTHOPAEDIC SURGERY

## 2020-09-09 PROCEDURE — 3600000013 HC SURGERY LEVEL 3 ADDTL 15MIN: Performed by: ORTHOPAEDIC SURGERY

## 2020-09-09 PROCEDURE — 6360000002 HC RX W HCPCS

## 2020-09-09 PROCEDURE — 7100000001 HC PACU RECOVERY - ADDTL 15 MIN: Performed by: ORTHOPAEDIC SURGERY

## 2020-09-09 PROCEDURE — 3700000000 HC ANESTHESIA ATTENDED CARE: Performed by: ORTHOPAEDIC SURGERY

## 2020-09-09 PROCEDURE — 2500000003 HC RX 250 WO HCPCS: Performed by: NURSE ANESTHETIST, CERTIFIED REGISTERED

## 2020-09-09 PROCEDURE — 3600000003 HC SURGERY LEVEL 3 BASE: Performed by: ORTHOPAEDIC SURGERY

## 2020-09-09 PROCEDURE — 6370000000 HC RX 637 (ALT 250 FOR IP): Performed by: ANESTHESIOLOGY

## 2020-09-09 PROCEDURE — 6360000002 HC RX W HCPCS: Performed by: NURSE ANESTHETIST, CERTIFIED REGISTERED

## 2020-09-09 PROCEDURE — 29826 SHO ARTHRS SRG DECOMPRESSION: CPT | Performed by: ORTHOPAEDIC SURGERY

## 2020-09-09 PROCEDURE — U0002 COVID-19 LAB TEST NON-CDC: HCPCS

## 2020-09-09 PROCEDURE — 2580000003 HC RX 258: Performed by: ANESTHESIOLOGY

## 2020-09-09 PROCEDURE — 7100000011 HC PHASE II RECOVERY - ADDTL 15 MIN: Performed by: ORTHOPAEDIC SURGERY

## 2020-09-09 PROCEDURE — 2500000003 HC RX 250 WO HCPCS: Performed by: NURSE PRACTITIONER

## 2020-09-09 PROCEDURE — 94640 AIRWAY INHALATION TREATMENT: CPT

## 2020-09-09 PROCEDURE — 29823 SHO ARTHRS SRG XTNSV DBRDMT: CPT | Performed by: ORTHOPAEDIC SURGERY

## 2020-09-09 PROCEDURE — 3700000001 HC ADD 15 MINUTES (ANESTHESIA): Performed by: ORTHOPAEDIC SURGERY

## 2020-09-09 RX ORDER — MEPERIDINE HYDROCHLORIDE 25 MG/ML
12.5 INJECTION INTRAMUSCULAR; INTRAVENOUS; SUBCUTANEOUS EVERY 10 MIN PRN
Status: DISCONTINUED | OUTPATIENT
Start: 2020-09-09 | End: 2020-09-09 | Stop reason: HOSPADM

## 2020-09-09 RX ORDER — IPRATROPIUM BROMIDE AND ALBUTEROL SULFATE 2.5; .5 MG/3ML; MG/3ML
1 SOLUTION RESPIRATORY (INHALATION) ONCE
Status: COMPLETED | OUTPATIENT
Start: 2020-09-09 | End: 2020-09-09

## 2020-09-09 RX ORDER — GLYCOPYRROLATE 1 MG/5 ML
SYRINGE (ML) INTRAVENOUS PRN
Status: DISCONTINUED | OUTPATIENT
Start: 2020-09-09 | End: 2020-09-09 | Stop reason: SDUPTHER

## 2020-09-09 RX ORDER — OXYCODONE HYDROCHLORIDE AND ACETAMINOPHEN 5; 325 MG/1; MG/1
1 TABLET ORAL EVERY 6 HOURS PRN
Qty: 28 TABLET | Refills: 0 | Status: SHIPPED | OUTPATIENT
Start: 2020-09-09 | End: 2020-09-24 | Stop reason: SDUPTHER

## 2020-09-09 RX ORDER — KETOROLAC TROMETHAMINE 30 MG/ML
15 INJECTION, SOLUTION INTRAMUSCULAR; INTRAVENOUS ONCE
Status: COMPLETED | OUTPATIENT
Start: 2020-09-09 | End: 2020-09-09

## 2020-09-09 RX ORDER — LIDOCAINE HYDROCHLORIDE 20 MG/ML
INJECTION, SOLUTION INTRAVENOUS PRN
Status: DISCONTINUED | OUTPATIENT
Start: 2020-09-09 | End: 2020-09-09 | Stop reason: SDUPTHER

## 2020-09-09 RX ORDER — CLINDAMYCIN PHOSPHATE 900 MG/50ML
900 INJECTION INTRAVENOUS ONCE
Status: COMPLETED | OUTPATIENT
Start: 2020-09-09 | End: 2020-09-09

## 2020-09-09 RX ORDER — MEPERIDINE HYDROCHLORIDE 25 MG/ML
12.5 INJECTION INTRAMUSCULAR; INTRAVENOUS; SUBCUTANEOUS EVERY 5 MIN PRN
Status: DISCONTINUED | OUTPATIENT
Start: 2020-09-09 | End: 2020-09-09 | Stop reason: HOSPADM

## 2020-09-09 RX ORDER — PROPOFOL 10 MG/ML
INJECTION, EMULSION INTRAVENOUS PRN
Status: DISCONTINUED | OUTPATIENT
Start: 2020-09-09 | End: 2020-09-09 | Stop reason: SDUPTHER

## 2020-09-09 RX ORDER — SODIUM CHLORIDE, SODIUM LACTATE, POTASSIUM CHLORIDE, CALCIUM CHLORIDE 600; 310; 30; 20 MG/100ML; MG/100ML; MG/100ML; MG/100ML
INJECTION, SOLUTION INTRAVENOUS CONTINUOUS
Status: DISCONTINUED | OUTPATIENT
Start: 2020-09-09 | End: 2020-09-09 | Stop reason: HOSPADM

## 2020-09-09 RX ORDER — BUPIVACAINE HYDROCHLORIDE 2.5 MG/ML
INJECTION, SOLUTION EPIDURAL; INFILTRATION; INTRACAUDAL PRN
Status: DISCONTINUED | OUTPATIENT
Start: 2020-09-09 | End: 2020-09-09 | Stop reason: ALTCHOICE

## 2020-09-09 RX ORDER — SODIUM CHLORIDE 0.9 % (FLUSH) 0.9 %
10 SYRINGE (ML) INJECTION PRN
Status: DISCONTINUED | OUTPATIENT
Start: 2020-09-09 | End: 2020-09-09 | Stop reason: HOSPADM

## 2020-09-09 RX ORDER — MEPERIDINE HYDROCHLORIDE 25 MG/ML
INJECTION INTRAMUSCULAR; INTRAVENOUS; SUBCUTANEOUS
Status: DISCONTINUED
Start: 2020-09-09 | End: 2020-09-09 | Stop reason: HOSPADM

## 2020-09-09 RX ORDER — FENTANYL CITRATE 50 UG/ML
INJECTION, SOLUTION INTRAMUSCULAR; INTRAVENOUS PRN
Status: DISCONTINUED | OUTPATIENT
Start: 2020-09-09 | End: 2020-09-09 | Stop reason: SDUPTHER

## 2020-09-09 RX ORDER — ONDANSETRON 2 MG/ML
INJECTION INTRAMUSCULAR; INTRAVENOUS PRN
Status: DISCONTINUED | OUTPATIENT
Start: 2020-09-09 | End: 2020-09-09 | Stop reason: SDUPTHER

## 2020-09-09 RX ORDER — NEOSTIGMINE METHYLSULFATE 1 MG/ML
INJECTION, SOLUTION INTRAVENOUS PRN
Status: DISCONTINUED | OUTPATIENT
Start: 2020-09-09 | End: 2020-09-09 | Stop reason: SDUPTHER

## 2020-09-09 RX ORDER — ROCURONIUM BROMIDE 10 MG/ML
INJECTION, SOLUTION INTRAVENOUS PRN
Status: DISCONTINUED | OUTPATIENT
Start: 2020-09-09 | End: 2020-09-09 | Stop reason: SDUPTHER

## 2020-09-09 RX ORDER — KETOROLAC TROMETHAMINE 15 MG/ML
INJECTION, SOLUTION INTRAMUSCULAR; INTRAVENOUS
Status: COMPLETED
Start: 2020-09-09 | End: 2020-09-09

## 2020-09-09 RX ORDER — KETOROLAC TROMETHAMINE 30 MG/ML
15 INJECTION, SOLUTION INTRAMUSCULAR; INTRAVENOUS ONCE
Status: DISCONTINUED | OUTPATIENT
Start: 2020-09-09 | End: 2020-09-09 | Stop reason: HOSPADM

## 2020-09-09 RX ORDER — MEPERIDINE HYDROCHLORIDE 25 MG/ML
25 INJECTION INTRAMUSCULAR; INTRAVENOUS; SUBCUTANEOUS ONCE
Status: COMPLETED | OUTPATIENT
Start: 2020-09-09 | End: 2020-09-09

## 2020-09-09 RX ORDER — DEXAMETHASONE SODIUM PHOSPHATE 10 MG/ML
INJECTION, SOLUTION INTRAMUSCULAR; INTRAVENOUS PRN
Status: DISCONTINUED | OUTPATIENT
Start: 2020-09-09 | End: 2020-09-09 | Stop reason: SDUPTHER

## 2020-09-09 RX ADMIN — IPRATROPIUM BROMIDE AND ALBUTEROL SULFATE 1 AMPULE: .5; 3 SOLUTION RESPIRATORY (INHALATION) at 14:03

## 2020-09-09 RX ADMIN — ROCURONIUM BROMIDE 40 MG: 10 SOLUTION INTRAVENOUS at 12:31

## 2020-09-09 RX ADMIN — ONDANSETRON 4 MG: 2 INJECTION INTRAMUSCULAR; INTRAVENOUS at 13:26

## 2020-09-09 RX ADMIN — MEPERIDINE HYDROCHLORIDE 25 MG: 25 INJECTION INTRAMUSCULAR; INTRAVENOUS; SUBCUTANEOUS at 15:05

## 2020-09-09 RX ADMIN — KETOROLAC TROMETHAMINE 15 MG: 30 INJECTION, SOLUTION INTRAMUSCULAR at 14:32

## 2020-09-09 RX ADMIN — SODIUM CHLORIDE, POTASSIUM CHLORIDE, SODIUM LACTATE AND CALCIUM CHLORIDE: 600; 310; 30; 20 INJECTION, SOLUTION INTRAVENOUS at 12:24

## 2020-09-09 RX ADMIN — SODIUM CHLORIDE, POTASSIUM CHLORIDE, SODIUM LACTATE AND CALCIUM CHLORIDE: 600; 310; 30; 20 INJECTION, SOLUTION INTRAVENOUS at 10:22

## 2020-09-09 RX ADMIN — CLINDAMYCIN PHOSPHATE 900 MG: 900 INJECTION, SOLUTION INTRAVENOUS at 12:30

## 2020-09-09 RX ADMIN — DEXAMETHASONE SODIUM PHOSPHATE 10 MG: 10 INJECTION, SOLUTION INTRAMUSCULAR; INTRAVENOUS at 12:57

## 2020-09-09 RX ADMIN — Medication 0.6 MG: at 13:30

## 2020-09-09 RX ADMIN — PROPOFOL 180 MG: 10 INJECTION, EMULSION INTRAVENOUS at 12:31

## 2020-09-09 RX ADMIN — Medication 3 MG: at 13:30

## 2020-09-09 RX ADMIN — FENTANYL CITRATE 100 MCG: 50 INJECTION, SOLUTION INTRAMUSCULAR; INTRAVENOUS at 12:31

## 2020-09-09 RX ADMIN — LIDOCAINE HYDROCHLORIDE 60 MG: 20 INJECTION, SOLUTION INTRAVENOUS at 12:31

## 2020-09-09 RX ADMIN — KETOROLAC TROMETHAMINE 15 MG: 15 INJECTION, SOLUTION INTRAMUSCULAR; INTRAVENOUS at 14:31

## 2020-09-09 RX ADMIN — ROCURONIUM BROMIDE 10 MG: 10 SOLUTION INTRAVENOUS at 13:08

## 2020-09-09 ASSESSMENT — PULMONARY FUNCTION TESTS
PIF_VALUE: 16
PIF_VALUE: 14
PIF_VALUE: 15
PIF_VALUE: 20
PIF_VALUE: 5
PIF_VALUE: 1
PIF_VALUE: 15
PIF_VALUE: 15
PIF_VALUE: 1
PIF_VALUE: 14
PIF_VALUE: 14
PIF_VALUE: 15
PIF_VALUE: 14
PIF_VALUE: 1
PIF_VALUE: 15
PIF_VALUE: 1
PIF_VALUE: 15
PIF_VALUE: 0
PIF_VALUE: 14
PIF_VALUE: 2
PIF_VALUE: 4
PIF_VALUE: 16
PIF_VALUE: 15
PIF_VALUE: 0
PIF_VALUE: 2
PIF_VALUE: 15
PIF_VALUE: 5
PIF_VALUE: 16
PIF_VALUE: 14
PIF_VALUE: 16
PIF_VALUE: 15
PIF_VALUE: 16
PIF_VALUE: 9
PIF_VALUE: 26
PIF_VALUE: 15
PIF_VALUE: 14
PIF_VALUE: 17
PIF_VALUE: 14
PIF_VALUE: 15
PIF_VALUE: 21
PIF_VALUE: 20
PIF_VALUE: 16
PIF_VALUE: 15
PIF_VALUE: 18
PIF_VALUE: 16
PIF_VALUE: 15
PIF_VALUE: 1
PIF_VALUE: 19
PIF_VALUE: 16
PIF_VALUE: 17
PIF_VALUE: 1
PIF_VALUE: 15
PIF_VALUE: 5
PIF_VALUE: 14
PIF_VALUE: 15
PIF_VALUE: 15
PIF_VALUE: 13
PIF_VALUE: 15
PIF_VALUE: 15
PIF_VALUE: 17
PIF_VALUE: 4

## 2020-09-09 ASSESSMENT — PAIN SCALES - GENERAL
PAINLEVEL_OUTOF10: 8
PAINLEVEL_OUTOF10: 8
PAINLEVEL_OUTOF10: 0
PAINLEVEL_OUTOF10: 7
PAINLEVEL_OUTOF10: 8
PAINLEVEL_OUTOF10: 0
PAINLEVEL_OUTOF10: 8
PAINLEVEL_OUTOF10: 5

## 2020-09-09 ASSESSMENT — PAIN DESCRIPTION - PROGRESSION
CLINICAL_PROGRESSION: GRADUALLY IMPROVING
CLINICAL_PROGRESSION: GRADUALLY WORSENING
CLINICAL_PROGRESSION: GRADUALLY IMPROVING

## 2020-09-09 ASSESSMENT — PAIN DESCRIPTION - PAIN TYPE
TYPE: SURGICAL PAIN
TYPE: ACUTE PAIN;SURGICAL PAIN

## 2020-09-09 ASSESSMENT — PAIN DESCRIPTION - ORIENTATION
ORIENTATION: RIGHT

## 2020-09-09 ASSESSMENT — PAIN DESCRIPTION - DESCRIPTORS
DESCRIPTORS: ACHING;DISCOMFORT
DESCRIPTORS: THROBBING
DESCRIPTORS: THROBBING
DESCRIPTORS: PATIENT UNABLE TO DESCRIBE

## 2020-09-09 ASSESSMENT — PAIN DESCRIPTION - LOCATION
LOCATION: SHOULDER

## 2020-09-09 ASSESSMENT — PAIN DESCRIPTION - FREQUENCY: FREQUENCY: INTERMITTENT

## 2020-09-09 ASSESSMENT — COPD QUESTIONNAIRES: CAT_SEVERITY: SEVERE

## 2020-09-09 ASSESSMENT — PAIN - FUNCTIONAL ASSESSMENT: PAIN_FUNCTIONAL_ASSESSMENT: 0-10

## 2020-09-09 NOTE — ANESTHESIA POSTPROCEDURE EVALUATION
Department of Anesthesiology  Postprocedure Note    Patient: Kathleen Shultz  MRN: [id number]  YOB: 1940  Date of evaluation: 9/9/2020  Time:  3:16 PM     Procedure Summary     Date:  09/09/20 Room / Location:  [address] [place] [address] 03 / [id number] [facility]IPS    Anesthesia Start:  1637 Anesthesia Stop:  6235    Procedure:  RIGHT SHOULDER ARTHROSCOPY, SUBACROMIAL DECOMPRESSION,  DEBRIDEMENT LABRIUM AND ROTATOR CUFF, CHONDROPLASTY ([address]) (Right Shoulder) Diagnosis:  (ROTATOR CUFF TEAR RIGHT)    Surgeon:  Dallas Escamilla DO Responsible Provider:  Florence Rodney MD    Anesthesia Type:  general ASA Status:  3          Anesthesia Type: No value filed. Lori Phase I: Lori Score: 9    Lori Phase II:      Last vitals: Reviewed and per EMR flowsheets.        Anesthesia Post Evaluation    Patient location during evaluation: PACU  Patient participation: complete - patient participated  Level of consciousness: awake and alert  Pain score: 6  Airway patency: patent  Nausea & Vomiting: no nausea  Complications: no  Cardiovascular status: blood pressure returned to baseline  Respiratory status: acceptable  Hydration status: euvolemic

## 2020-09-09 NOTE — OP NOTE
of tear   involving the anterior and posterior labrum, no abnormalities of the biceps   tendon. Biceps was in normal condition. Rotator cuff from the undersurface showed undersurface fraying, I established   the anterior working portal at the rotator cuff interval and debrided the   labral tear, and the pt rtc tear using the 4-0 shaver. I then removed all fluid from the shoulder joint and went into   the subacromial space and completed the complete subacromial bursectomy. I also performed a chondrolpasty of the humeral head. Then using an ArthroCare electrode,   I outlined the acromial edges using ArthroCare electrode. I then  smoothed the   anterior and inferior acromion using a 5-0 alexander, performed an acromioplastysmoothing to a stable   Type 1 contour. I   flattened the acromial arch. No other abnormalities noted. I then removed all fluid from the shoulder joint and closed the incision with   4-0 Prolene in a horizontal mattress-type fashion. Sterile dressing was   placed on the wound. The patient recovered in the recovery room without   difficulty.              Electronically signed by Akash Figueroa DO on 9/9/2020 at 2:17 PM

## 2020-09-09 NOTE — ANESTHESIA PRE PROCEDURE
Department of Anesthesiology  Preprocedure Note       Name:  Beatriz Villrareal   Age:  [de-identified] y.o.  :  1940                                          MRN:  [id number]         Date:  2020      Surgeon: Mariola Kaur): Jairon Winkler DO    Procedure: Procedure(s):  RIGHT SHOULDER ARTHROSCOPY ROTATOR CUFF REPAIR SUBACROMIAL DECOMPRESSION DEBRIDEMENT ([address])    Medications prior to admission:   Prior to Admission medications    Medication Sig Start Date End Date Taking?  Authorizing Provider   OXYGEN Inhale 2 L into the lungs intermittent   Yes Historical Provider, MD   Cyanocobalamin (VITAMIN B 12 PO) Take by mouth daily   Yes Historical Provider, MD   Cholecalciferol (VITAMIN D3) 50 MCG (2000) TABS Take 2 tablets by mouth daily   Yes Historical Provider, MD   lisinopril-hydroCHLOROthiazide (PRINZIDE;ZESTORETIC) 20-12.5 MG per tablet TAKE ONE TABLET BY MOUTH DAILY 20  Yes Aleida Scales DO   fluticasone [facility]) 50 MCG/ACT nasal spray 2 sprays by Each Nostril route daily 20  Yes INDIGO Jorge - CNP   aspirin 81 MG EC tablet Take 81 mg by mouth daily Last dose 2 weeks ago   Yes Historical Provider, MD   fluticasone-umeclidin-vilant (TRELEGY ELLIPTA) 100-62.5-25 MCG/INH AEPB Inhale 1 puff into the lungs daily   Yes Historical Provider, MD   albuterol (PROVENTIL) (2.5 MG/3ML) 0.083% nebulizer solution INHALE THE CONTENTS OF 1 VIAL (3 ML) VIA NEBULIZER 4 TIMES DAILY AS NEEDED WHEN COUGHING  WHEEZING  OR SHORT OF BREATH 20  Yes Historical Provider, MD   albuterol sulfate  (90 Base) MCG/ACT inhaler Inhale 2 puffs into the lungs 4 times daily as needed for Wheezing 19  Yes Aleida Scales DO       Current medications:    Current Facility-Administered Medications   Medication Dose Route Frequency Provider Last Rate Last Dose    clindamycin (CLEOCIN) 900 mg in dextrose 5 % 50 mL IVPB  900 mg Intravenous Once Tony iMller APRN - CNP        lactated ringers infusion Intravenous Continuous Perla Barrera MD        sodium chloride flush 0.9 % injection 10 mL  10 mL Intravenous PRN Perla Barrera MD           Allergies: Allergies   Allergen Reactions    Penicillins Hives     Long ago       Problem List:    Patient Active Problem List   Diagnosis Code    Shoulder impingement, right M75.41    Nontraumatic complete tear of right rotator cuff M75.121       Past Medical History:        Diagnosis Date    COPD (chronic obstructive pulmonary disease) ([facility] [address]     Hypertension     Oxygen dependent     2 l doesnt use continuous       Past Surgical History:        Procedure Laterality Date    HERNIA REPAIR      age 61       Social History:    Social History     Tobacco Use    Smoking status: Former Smoker     Packs/day: 1.00     Years: 60.00     Pack years: 60.00     Last attempt to quit: 11/19/2016     Years since quitting: 3.8    Smokeless tobacco: Never Used   Substance Use Topics    Alcohol use: No                                Counseling given: Not Answered      Vital Signs (Current):   Vitals:    09/08/20 0820   Weight: 183 lb (83 kg)   Height: 5' 10\" (1.778 m)                                              BP Readings from Last 3 Encounters:   09/01/20 138/72   08/31/20 110/64   08/24/20 116/68       NPO Status:                                                                                 BMI:   Wt Readings from Last 3 Encounters:   09/08/20 183 lb (83 kg)   09/01/20 183 lb 11.2 oz (83.3 kg)   08/31/20 183 lb (83 kg)     Body mass index is 26.26 kg/m².     CBC:   Lab Results   Component Value Date    WBC 8.5 09/02/2020    RBC 4.54 09/02/2020    HGB 14.4 09/02/2020    HCT 45.8 09/02/2020    .9 09/02/2020    RDW 14.6 09/02/2020     09/02/2020       CMP:   Lab Results   Component Value Date     09/02/2020    K 5.2 09/02/2020     09/02/2020    CO2 22 09/02/2020    BUN 42 09/02/2020    CREATININE 2.6 09/02/2020    GFRAA 29 09/02/2020    LABGLOM 24

## 2020-09-09 NOTE — H&P
Updated H&P      Chief Complaint   Patient presents with    Shoulder Pain       Right Shoulder MRI F/U         Mulu Taveras is a [de-identified]y.o. year old   male who is seen today  for follow up of his right shoulder. He has pain with over head movement. The cortisone injection lasted 3-4 weeks. The patient is on 2L of O2 during the day.            Chief Complaint   Patient presents with    Shoulder Pain       Right Shoulder MRI F/U     Past Medical History   History reviewed. No pertinent past medical history. Past Surgical History   History reviewed. No pertinent surgical history.      Current Medication     Current Outpatient Medications:     lisinopril-hydroCHLOROthiazide (PRINZIDE;ZESTORETIC) 20-12.5 MG per tablet, Take 1 tablet by mouth daily, Disp: 30 tablet, Rfl: 2    ipratropium-albuterol (DUONEB) 0.5-2.5 (3) MG/3ML SOLN nebulizer solution, INHALE 1 UNIT DOSE VIAL 4 TIMES DAILY VIA NEBULIZER MACHINE AS NEEDED FOR COUGHING  WHEEZING OR SHORTNESS OF BREATH, Disp: , Rfl:     lisinopril-hydroCHLOROthiazide (PRINZIDE;ZESTORETIC) 20-12.5 MG per tablet, TAKE ONE TABLET BY MOUTH DAILY, Disp: , Rfl:     albuterol (PROVENTIL) (2.5 MG/3ML) 0.083% nebulizer solution, INHALE THE CONTENTS OF 1 VIAL (3 ML) VIA NEBULIZER 4 TIMES DAILY AS NEEDED WHEN COUGHING  WHEEZING  OR SHORT OF BREATH, Disp: , Rfl:     budesonide-formoterol (SYMBICORT) 160-4.5 MCG/ACT AERO, Inhale 2 puffs into the lungs 2 times daily, Disp: , Rfl:     albuterol sulfate  (90 Base) MCG/ACT inhaler, Inhale 2 puffs into the lungs 4 times daily as needed for Wheezing, Disp: 1 Inhaler, Rfl: 5    budesonide-formoterol (SYMBICORT) 160-4.5 MCG/ACT AERO, Inhale 2 puffs into the lungs 2 times daily, Disp: 3 Inhaler, Rfl: 1    tiotropium (SPIRIVA HANDIHALER) 18 MCG inhalation capsule, Inhale 1 capsule into the lungs daily, Disp: 30 capsule, Rfl: 2           Allergies   Allergen Reactions    Penicillins Hives       Long ago     Social History          Socioeconomic History    Marital status:        Spouse name: Not on file    Number of children: Not on file    Years of education: Not on file    Highest education level: Not on file   Occupational History    Not on file   Social Needs    Financial resource strain: Not on file    Food insecurity       Worry: Not on file       Inability: Not on file    Transportation needs       Medical: Not on file       Non-medical: Not on file   Tobacco Use    Smoking status: Former Smoker       Last attempt to quit: 11/19/2016       Years since quitting: 3.7    Smokeless tobacco: Never Used   Substance and Sexual Activity    Alcohol use: No    Drug use: No    Sexual activity: Not on file   Lifestyle    Physical activity       Days per week: Not on file       Minutes per session: Not on file    Stress: Not on file   Relationships    Social connections       Talks on phone: Not on file       Gets together: Not on file       Attends [employer] service: Not on file       Active member of club or organization: Not on file       Attends meetings of clubs or organizations: Not on file       Relationship status: Not on file    Intimate partner violence       Fear of current or ex partner: Not on file       Emotionally abused: Not on file       Physically abused: Not on file       Forced sexual activity: Not on file   Other Topics Concern    Not on file   Social History Narrative    Not on file        Family History   No family history on file.        REVIEW OF SYSTEMS:      General/Constitution:  (-)weight loss, (-)fever, (-)chills, (-)weakness. Skin: (-) rash,(-) psoriasis,(-) eczema, (-)skin cancer. Musculoskeletal: (-) fractures,  (-) dislocations,(-) collagen vascular disease, (-) fibromyalgia, (-) multiple sclerosis, (-) muscular dystrophy, (-) RSD,(-) joint pain (-)swelling, (-) joint pain,swelling.   Neurologic: (-) epilepsy, (-)seizures,(-) brain tumor,(-) TIA, (-)stroke, (-)headaches, (-)Parkinson disease,(-) memory loss, (-) LOC. Cardiovascular: (-) Chest pain, (-) swelling in legs/feet, (-) SOB, (-) cramping in legs/feet with walking. Respiratory: (-) SOB, (-) Coughing, (-) night sweats. GI: (-) nausea, (-) vomiting, (-) diarrhea, (-) blood in stool, (-) gastric ulcer. Psychiatric: (-) Depression, (-) Anxiety, (-) bipolar disease, (-) Alzheimer's Disease  Allergic/Immunologic: (-) allergies latex, (-) allergies metal, (-) skin sensitivity. Hematlogic: (-) anemia, (-) blood transfusion, (-) DVT/PE, (-) Clotting disorders        Subjective:     Constitution:  Temp 98 °F (36.7 °C)   Ht 5' 10.5\" (1.791 m)   Wt 183 lb (83 kg)   BMI 25.89 kg/m²      Psycihatric:  The patient is alert and oriented x 3, appears to be stated age and in no distress.       Respiratory:  Respiratory effort is not labored. Patient is not gasping. Palpation of the chest reveals no tactile fremitus.     Skin:  Upon inspection: the skin appears warm, dry and intact. There is not a previous scar over the affected area. There is not any cellulitis, lymphedema or cutaneous lesions noted in the lower extremities. Upon palpation there is no induration noted.       Neurologic:  Motor exam of the upper extremities show: The reflexes in biceps/triceps/brachioradialis are equal and symmetric. Sensory exam C5-T1 are normal bilaterally.         Cardiovascular: The vascular exam is normal and is well perfused to distal extremities. There are 2+ radial pulses bilaterally, and motor and sensation is intact to median, ulnar, and radial, musclocutaneus, and axillary nerve distribution and grossly symmetric bilaterally. There is cap refill noted less than two seconds in all digits. There is not edema of the bilateral upper extremities.   There is not varicosities noted in the distal extremities.       Lymph:  Upon palpation,  there is no lymphadenopathy noted in bilateral upper extremities.       Musculoskeletal:  Gait: normal; Yes    Nontraumatic complete tear of right rotator cuff          Plan:   I had a lengthy discussion with the patient regarding their diagnosis. I explained treatment options including surgical vs non surgical treatment. I reviewed in detail the risks and benefits and outlined the procedure in detail with expected outcomes and possible complications. I also discussed non surgical treatment such as injections (CSI and visco supplementation), physical therapy, topical creams and NSAID's. Patient will talk with pulmonologist and PCP regarding surgical intervention  Patient is hesitant about surgery and will call our office if he would like to proceed with treatment, will need to be done on Wednesday. I discussed the risks and benefits of the procedure with the patient. The risks include but are not limited to: infection, injuries to blood vessels and nerves, non relief of symptoms, intraoperative fracture, need for further operative intervention, blood loss, arthrofibrosis of shoulder, DVT/PE, MI and death. The patient understands these risks and wishes to proceed with surgery. I will perform a Right shoulder rtc repair, sad, and debridement.

## 2020-09-09 NOTE — PROGRESS NOTES
The patients spouse was updated, she was notified that the patient would need his portable tank of oxygen to go home. She verbalized understanding and is going home to retrieve it. She verbalized that she lives close by.

## 2020-09-15 NOTE — PROGRESS NOTES
CLINICAL PHARMACY NOTE: MEDS TO [address] [place] [address] Drive Select Patient?: No  Total # of Prescriptions Filled: 1   The following medications were delivered to the patient:  · Oxycodone 5-325 mg  Total # of Interventions Completed: 1  Time Spent (min): 30    Additional Documentation:

## 2020-09-24 ENCOUNTER — OFFICE VISIT (OUTPATIENT)
Dept: ORTHOPEDIC SURGERY | Age: 80
End: 2020-09-24

## 2020-09-24 VITALS — WEIGHT: 182 LBS | TEMPERATURE: 98 F | HEIGHT: 70 IN | BODY MASS INDEX: 26.05 KG/M2

## 2020-09-24 PROCEDURE — 99024 POSTOP FOLLOW-UP VISIT: CPT | Performed by: ORTHOPAEDIC SURGERY

## 2020-09-24 RX ORDER — OXYCODONE HYDROCHLORIDE AND ACETAMINOPHEN 5; 325 MG/1; MG/1
1 TABLET ORAL EVERY 6 HOURS PRN
Qty: 28 TABLET | Refills: 0 | Status: SHIPPED
Start: 2020-09-24 | End: 2021-01-11 | Stop reason: SDUPTHER

## 2020-09-24 NOTE — PROGRESS NOTES
Dex Metz is here for follow-up after right shoulder arthroscopy. Findings at surgery:  Djd, partial rtc tear and impingement. Pain is controlled with current analgesics. Medication(s) being used: percocet. The patient denies fever, wound drainage, increasing redness, pus, increasing pain, increasing swelling. Post op problems reported: none. He is ambulating normally. Shoulder exam -   The incisions are clean, dry and intact. right 130/30/pl range of motion   no pain on motion, no tenderness or deformity noted. Motor and sensory exam is grossly intact in B/L upper extremities. Special test results are as follow:  Impingement negative, Meraz negative, Speeds negative, Apprehension negative, Sheehan negative, Load Shiftnegative, Nhi manuver negative, Cross arm test negative. Encounter Diagnoses   Name Primary?  Shoulder impingement, right     Nontraumatic incomplete tear of right rotator cuff Yes    Glenohumeral arthritis, right        Plan:  Percocet escribed  The patient will continue with gentle ROM exercises and being activities as tolerated. The patient is not being referred to physical therapy. Sling will be used for comfort ONLY. Patient is to continue analgesics and needed and use ice for pain. We will see the pain back in 4 weeks time for repeat evaluation.

## 2020-10-21 ENCOUNTER — OFFICE VISIT (OUTPATIENT)
Dept: ORTHOPEDIC SURGERY | Age: 80
End: 2020-10-21
Payer: MEDICARE

## 2020-10-21 VITALS — TEMPERATURE: 98 F | BODY MASS INDEX: 26.05 KG/M2 | HEIGHT: 70 IN | WEIGHT: 182 LBS

## 2020-10-21 PROCEDURE — 20610 DRAIN/INJ JOINT/BURSA W/O US: CPT | Performed by: NURSE PRACTITIONER

## 2020-10-21 PROCEDURE — 99024 POSTOP FOLLOW-UP VISIT: CPT | Performed by: NURSE PRACTITIONER

## 2020-10-21 RX ORDER — TRIAMCINOLONE ACETONIDE 40 MG/ML
40 INJECTION, SUSPENSION INTRA-ARTICULAR; INTRAMUSCULAR ONCE
Status: COMPLETED | OUTPATIENT
Start: 2020-10-21 | End: 2020-10-21

## 2020-10-21 RX ADMIN — TRIAMCINOLONE ACETONIDE 40 MG: 40 INJECTION, SUSPENSION INTRA-ARTICULAR; INTRAMUSCULAR at 14:29

## 2020-10-21 NOTE — PROGRESS NOTES
Betty Rainey is here for follow-up after right shoulder arthroscopy. Findings at surgery:  Djd, partial rtc tear and impingement. Pain is controlled with current analgesics. Medication(s) being used: percocet. ppatient has had increased pain to anterior shoulder. Shoulder exam -   The incisions are clean, dry and intact. right 140/30/pl range of motion   no pain on motion, no tenderness or deformity noted. Motor and sensory exam is grossly intact in B/L upper extremities. Special test results are as follow:  Impingement negative, Meraz negative, Speeds negative, Apprehension negative, Sheehan negative, Load Shiftnegative, Nhi manuver negative, Cross arm test negative. Encounter Diagnoses   Name Primary?  Glenohumeral arthritis, right Yes    Shoulder impingement, right     Nontraumatic incomplete tear of right rotator cuff        Plan:  Percocet escribed  The patient will continue with gentle ROM exercises and being activities as tolerated. The patient is not being referred to physical therapy. I will proceed with a cortisone injection in the Right shoulder. Verbal and written consent was obtained for the injection. Skin was prepped with alcohol, 1 ml of Kenalog 40mg and 9 ml of 0.25% Marcaine was injected into the anterior aspect into the glenohumeral joint of the Right shoulder. The patient tolerated the injections well. I will see the patient back in 3 months.

## 2020-12-21 ENCOUNTER — OFFICE VISIT (OUTPATIENT)
Dept: ENT CLINIC | Age: 80
End: 2020-12-21
Payer: MEDICARE

## 2020-12-21 ENCOUNTER — PROCEDURE VISIT (OUTPATIENT)
Dept: AUDIOLOGY | Age: 80
End: 2020-12-21
Payer: MEDICARE

## 2020-12-21 VITALS — HEIGHT: 70 IN | WEIGHT: 185 LBS | TEMPERATURE: 97.8 F | BODY MASS INDEX: 26.48 KG/M2

## 2020-12-21 PROCEDURE — G8427 DOCREV CUR MEDS BY ELIG CLIN: HCPCS | Performed by: NURSE PRACTITIONER

## 2020-12-21 PROCEDURE — 4040F PNEUMOC VAC/ADMIN/RCVD: CPT | Performed by: NURSE PRACTITIONER

## 2020-12-21 PROCEDURE — 1123F ACP DISCUSS/DSCN MKR DOCD: CPT | Performed by: NURSE PRACTITIONER

## 2020-12-21 PROCEDURE — G8484 FLU IMMUNIZE NO ADMIN: HCPCS | Performed by: NURSE PRACTITIONER

## 2020-12-21 PROCEDURE — 1036F TOBACCO NON-USER: CPT | Performed by: NURSE PRACTITIONER

## 2020-12-21 PROCEDURE — G8417 CALC BMI ABV UP PARAM F/U: HCPCS | Performed by: NURSE PRACTITIONER

## 2020-12-21 PROCEDURE — 69210 REMOVE IMPACTED EAR WAX UNI: CPT | Performed by: NURSE PRACTITIONER

## 2020-12-21 PROCEDURE — 99213 OFFICE O/P EST LOW 20 MIN: CPT | Performed by: NURSE PRACTITIONER

## 2020-12-21 PROCEDURE — 92567 TYMPANOMETRY: CPT | Performed by: AUDIOLOGIST

## 2020-12-21 RX ORDER — FLUTICASONE PROPIONATE 50 MCG
2 SPRAY, SUSPENSION (ML) NASAL DAILY
Qty: 2 BOTTLE | Refills: 5 | Status: SHIPPED | OUTPATIENT
Start: 2020-12-21

## 2020-12-21 ASSESSMENT — ENCOUNTER SYMPTOMS
ABDOMINAL PAIN: 0
RESPIRATORY NEGATIVE: 1
STRIDOR: 0
EYES NEGATIVE: 1
GASTROINTESTINAL NEGATIVE: 1
SHORTNESS OF BREATH: 0
RHINORRHEA: 0

## 2020-12-21 NOTE — PROGRESS NOTES
This patient was referred for tympanometric testing by Aleksandr Carnes, ENT NP, due to chronic otitis media, right ear. Tympanometry revealed a flat tympanogram, with no middle ear peak pressure, right ear and normal middle ear peak pressure and compliance, left ear. Ipsilateral acoustic reflexes were unable to be tested, right ear and absent, left ear at 1000Hz. The results were reviewed with the patient. Recommendations for follow up will be made pending physician consult.     Luis Zepeda CCC/LAURI  Audiologist  [id number]  NPI#:  [id number]

## 2020-12-21 NOTE — PROGRESS NOTES
[id number] [facility] Otolaryngology  Dr. Rosalio Horvath. Ms.Ed        Patient Name:  Laura Rodriguez  :  1940     CHIEF C/O:    Chief Complaint   Patient presents with    Follow-up     patient states no change from last visit    Sinus Problem       HISTORY OBTAINED FROM:  patient    HISTORY OF PRESENT ILLNESS:       Nellie Wagner is a 79y.o. year old male, here today for follow up of allergies and cerumen impactions. He was started on Flonase 3 months ago for persistent congestion and postnasal drainage which he states has improved his symptoms. He does complain that the right ear still feels full since his last appointment. He denies any current pain or drainage. He denies any ringing or tinnitus.   Using flonase for 3 months      Past Medical History:   Diagnosis Date    COPD (chronic obstructive pulmonary disease) ([facility] [address]     Hypertension     Oxygen dependent     2 l doesnt use continuous     Past Surgical History:   Procedure Laterality Date    HERNIA REPAIR      age 61   [facility] SHOULDER ARTHROSCOPY Right 2020    RIGHT SHOULDER ARTHROSCOPY, SUBACROMIAL DECOMPRESSION,  DEBRIDEMENT LABRIUM AND ROTATOR CUFF, CHONDROPLASTY (ARTHREX) performed by Yue Fung DO at [address]       Current Outpatient Medications:     fluticasone (FLONASE) 50 MCG/ACT nasal spray, 2 sprays by Each Nostril route daily, Disp: 2 Bottle, Rfl: 5    OXYGEN, Inhale 2 L into the lungs intermittent, Disp: , Rfl:     Cyanocobalamin (VITAMIN B 12 PO), Take by mouth daily, Disp: , Rfl:     Cholecalciferol (VITAMIN D3) 50 MCG ( UT) TABS, Take 2 tablets by mouth daily, Disp: , Rfl:     lisinopril-hydroCHLOROthiazide (PRINZIDE;ZESTORETIC) 20-12.5 MG per tablet, TAKE ONE TABLET BY MOUTH DAILY, Disp: 90 tablet, Rfl: 1    aspirin 81 MG EC tablet, Take 81 mg by mouth daily Last dose 2 weeks ago, Disp: , Rfl:     fluticasone-umeclidin-vilant (TRELEGY ELLIPTA) 100-62.5-25 MCG/INH AEPB, Inhale 1 puff into the lungs daily, Disp: , Rfl:    albuterol (PROVENTIL) (2.5 MG/3ML) 0.083% nebulizer solution, INHALE THE CONTENTS OF 1 VIAL (3 ML) VIA NEBULIZER 4 TIMES DAILY AS NEEDED WHEN COUGHING  WHEEZING  OR SHORT OF BREATH, Disp: , Rfl:     albuterol sulfate  (90 Base) MCG/ACT inhaler, Inhale 2 puffs into the lungs 4 times daily as needed for Wheezing, Disp: 1 Inhaler, Rfl: 5  Penicillins  Social History     Tobacco Use    Smoking status: Former Smoker     Packs/day: 1.00     Years: 60.00     Pack years: 60.00     Quit date: 2016     Years since quittin.0    Smokeless tobacco: Never Used   Substance Use Topics    Alcohol use: No    Drug use: No     History reviewed. No pertinent family history. Review of Systems   Constitutional: Negative. Negative for activity change and appetite change. HENT: Positive for hearing loss. Negative for congestion, postnasal drip and rhinorrhea. Eyes: Negative. Respiratory: Negative. Negative for shortness of breath and stridor. Cardiovascular: Negative. Negative for chest pain and palpitations. Gastrointestinal: Negative. Negative for abdominal pain. Endocrine: Negative. Genitourinary: Negative. Musculoskeletal: Negative. Skin: Negative. Neurological: Negative. Negative for dizziness. Hematological: Negative. Psychiatric/Behavioral: Negative. Temp 97.8 °F (36.6 °C)   Ht 5' 10\" (1.778 m)   Wt 185 lb (83.9 kg)   BMI 26.54 kg/m²   Physical Exam  Constitutional:       Appearance: Normal appearance. HENT:      Head: Normocephalic. Right Ear: Tympanic membrane normal. Decreased hearing noted. There is impacted cerumen. Left Ear: Tympanic membrane and external ear normal. Decreased hearing noted. There is impacted cerumen. Ears:        Nose: Nose normal. No rhinorrhea. Right Turbinates: Not swollen or pale. Left Turbinates: Not swollen or pale. Mouth/Throat:      Lips: Pink. Pharynx: Oropharynx is clear.    Eyes: Conjunctiva/sclera: Conjunctivae normal.      Pupils: Pupils are equal, round, and reactive to light. Neck:      Musculoskeletal: Normal range of motion. No neck rigidity or muscular tenderness. Cardiovascular:      Rate and Rhythm: Normal rate and regular rhythm. Pulses: Normal pulses. Pulmonary:      Effort: Pulmonary effort is normal. No respiratory distress. Breath sounds: No stridor. Musculoskeletal: Normal range of motion. Skin:     General: Skin is warm and dry. Neurological:      General: No focal deficit present. Mental Status: He is alert and oriented to person, place, and time. Psychiatric:         Mood and Affect: Mood normal.         Behavior: Behavior normal.         Thought Content: Thought content normal.         Judgment: Judgment normal.     Tympanogram reviewed with patient. Flat curve in the right ear with normal type a curve in the left ear. Right ear obstructed by complete cerumen impaction. TM appears normal.    IMPRESSION/PLAN:  Cerumen removal     Auditory canal(s) both ears completely obstructed with cerumen. Cerumen was gently removed using soft plastic curette, gentle suction. Tympanic membranes are intact following the procedure. Auditory canals appear normal.        Sheeba Rondon was seen today for follow-up and sinus problem. Diagnoses and all orders for this visit:    Bilateral impacted cerumen  -     UT REMOVAL IMPACTED CERUMEN INSTRUMENTATION UNILAT    Allergic rhinitis, unspecified seasonality, unspecified trigger    Other orders  -     fluticasone (FLONASE) 50 MCG/ACT nasal spray; 2 sprays by Each Nostril route daily        Changes seen today for 3-month follow-up of allergies and cerumen impactions. Upon exam he is found to have complete impactions of bilateral ear canals that were removed without difficulty. Bilateral TMs are normal in appearance without sign of middle ear effusion or retraction at this time.   Sinuses are patent without considerable swelling or paleness. There is no rhinorrhea or postnasal drainage observed. At this time we will continue with current regimen of Flonase, 2 sprays each nostril once daily. Is also instructed to increase the Debrox to bilateral ears to once weekly and 3 to 5 days consecutively prior to his next appointment. Audiogram is once again reviewed with the patient from his previous appointment but due to the cost he is declining hearing aids at this time. He will follow-up in 6 months. He is instructed to call with any new or worsening symptoms prior to his next appointment.     Saturnino Valdivia, TRICIA, FNP-C  [facility], Nose and Throat    The following note was created using voice recognition software and may contain, syntax grammatical or content errors

## 2020-12-30 DIAGNOSIS — Z00.00 ROUTINE GENERAL MEDICAL EXAMINATION AT A HEALTH CARE FACILITY: ICD-10-CM

## 2020-12-30 LAB
ALBUMIN SERPL-MCNC: 3.8 G/DL (ref 3.5–5.2)
ALP BLD-CCNC: 72 U/L (ref 40–129)
ALT SERPL-CCNC: 12 U/L (ref 0–40)
ANION GAP SERPL CALCULATED.3IONS-SCNC: 16 MMOL/L (ref 7–16)
AST SERPL-CCNC: 16 U/L (ref 0–39)
BILIRUB SERPL-MCNC: 1 MG/DL (ref 0–1.2)
BUN BLDV-MCNC: 38 MG/DL (ref 8–23)
CALCIUM SERPL-MCNC: 9.6 MG/DL (ref 8.6–10.2)
CHLORIDE BLD-SCNC: 102 MMOL/L (ref 98–107)
CHOLESTEROL, TOTAL: 217 MG/DL (ref 0–199)
CO2: 21 MMOL/L (ref 22–29)
CREAT SERPL-MCNC: 2.1 MG/DL (ref 0.7–1.2)
GFR AFRICAN AMERICAN: 37
GFR NON-AFRICAN AMERICAN: 30 ML/MIN/1.73
GLUCOSE BLD-MCNC: 90 MG/DL (ref 74–99)
HBA1C MFR BLD: 5.8 % (ref 4–5.6)
HCT VFR BLD CALC: 47.3 % (ref 37–54)
HDLC SERPL-MCNC: 53 MG/DL
HEMOGLOBIN: 14.9 G/DL (ref 12.5–16.5)
LDL CHOLESTEROL CALCULATED: 144 MG/DL (ref 0–99)
MCH RBC QN AUTO: 30.8 PG (ref 26–35)
MCHC RBC AUTO-ENTMCNC: 31.5 % (ref 32–34.5)
MCV RBC AUTO: 97.9 FL (ref 80–99.9)
PDW BLD-RTO: 14.3 FL (ref 11.5–15)
PLATELET # BLD: 275 E9/L (ref 130–450)
PMV BLD AUTO: 10.3 FL (ref 7–12)
POTASSIUM SERPL-SCNC: 4.6 MMOL/L (ref 3.5–5)
PROSTATE SPECIFIC ANTIGEN: 3.39 NG/ML (ref 0–4)
RBC # BLD: 4.83 E12/L (ref 3.8–5.8)
SODIUM BLD-SCNC: 139 MMOL/L (ref 132–146)
T4 FREE: 1.05 NG/DL (ref 0.93–1.7)
TOTAL PROTEIN: 7.5 G/DL (ref 6.4–8.3)
TRIGL SERPL-MCNC: 101 MG/DL (ref 0–149)
TSH SERPL DL<=0.05 MIU/L-ACNC: 3.8 UIU/ML (ref 0.27–4.2)
VLDLC SERPL CALC-MCNC: 20 MG/DL
WBC # BLD: 8.6 E9/L (ref 4.5–11.5)

## 2021-01-01 ENCOUNTER — OFFICE VISIT (OUTPATIENT)
Dept: ENT CLINIC | Age: 81
End: 2021-01-01
Payer: MEDICARE

## 2021-01-01 ENCOUNTER — HOSPITAL ENCOUNTER (OUTPATIENT)
Dept: GENERAL RADIOLOGY | Age: 81
Discharge: HOME | End: 2021-04-29
Payer: MEDICARE

## 2021-01-01 VITALS
WEIGHT: 178 LBS | BODY MASS INDEX: 24.92 KG/M2 | HEIGHT: 71 IN | DIASTOLIC BLOOD PRESSURE: 80 MMHG | HEART RATE: 82 BPM | SYSTOLIC BLOOD PRESSURE: 125 MMHG

## 2021-01-01 VITALS — WEIGHT: 182 LBS | BODY MASS INDEX: 26.05 KG/M2 | HEIGHT: 70 IN

## 2021-01-01 DIAGNOSIS — R11.2 NAUSEA AND VOMITING, INTRACTABILITY OF VOMITING NOT SPECIFIED, UNSPECIFIED VOMITING TYPE: ICD-10-CM

## 2021-01-01 DIAGNOSIS — H61.23 BILATERAL IMPACTED CERUMEN: Primary | ICD-10-CM

## 2021-01-01 DIAGNOSIS — D50.0 IRON DEFICIENCY ANEMIA SECONDARY TO BLOOD LOSS (CHRONIC): ICD-10-CM

## 2021-01-01 DIAGNOSIS — E78.01 FAMILIAL HYPERCHOLESTEROLEMIA: ICD-10-CM

## 2021-01-01 DIAGNOSIS — E03.9 PRIMARY HYPOTHYROIDISM: ICD-10-CM

## 2021-01-01 DIAGNOSIS — J30.9 ALLERGIC RHINITIS, UNSPECIFIED SEASONALITY, UNSPECIFIED TRIGGER: ICD-10-CM

## 2021-01-01 DIAGNOSIS — Z00.00 ROUTINE GENERAL MEDICAL EXAMINATION AT A HEALTH CARE FACILITY: ICD-10-CM

## 2021-01-01 DIAGNOSIS — E55.9 VITAMIN D DEFICIENCY: ICD-10-CM

## 2021-01-01 DIAGNOSIS — R73.9 HYPERGLYCEMIA: ICD-10-CM

## 2021-01-01 LAB
ALBUMIN SERPL-MCNC: 4 G/DL (ref 3.5–5.2)
ALP BLD-CCNC: 90 U/L (ref 40–129)
ALT SERPL-CCNC: 15 U/L (ref 0–40)
ANION GAP SERPL CALCULATED.3IONS-SCNC: 16 MMOL/L (ref 7–16)
AST SERPL-CCNC: 17 U/L (ref 0–39)
BILIRUB SERPL-MCNC: 0.8 MG/DL (ref 0–1.2)
BUN BLDV-MCNC: 25 MG/DL (ref 6–23)
CALCIUM SERPL-MCNC: 9.6 MG/DL (ref 8.6–10.2)
CHLORIDE BLD-SCNC: 107 MMOL/L (ref 98–107)
CHOLESTEROL, TOTAL: 221 MG/DL (ref 0–199)
CO2: 23 MMOL/L (ref 22–29)
CREAT SERPL-MCNC: 1.7 MG/DL (ref 0.7–1.2)
GFR AFRICAN AMERICAN: 47
GFR NON-AFRICAN AMERICAN: 39 ML/MIN/1.73
GLUCOSE BLD-MCNC: 90 MG/DL (ref 74–99)
HBA1C MFR BLD: 5.5 % (ref 4–5.6)
HCT VFR BLD CALC: 47.9 % (ref 37–54)
HDLC SERPL-MCNC: 55 MG/DL
HEMOGLOBIN: 15.5 G/DL (ref 12.5–16.5)
LDL CHOLESTEROL CALCULATED: 147 MG/DL (ref 0–99)
MCH RBC QN AUTO: 31.3 PG (ref 26–35)
MCHC RBC AUTO-ENTMCNC: 32.4 % (ref 32–34.5)
MCV RBC AUTO: 96.6 FL (ref 80–99.9)
PDW BLD-RTO: 15 FL (ref 11.5–15)
PLATELET # BLD: 229 E9/L (ref 130–450)
PMV BLD AUTO: 10.4 FL (ref 7–12)
POTASSIUM SERPL-SCNC: 4.6 MMOL/L (ref 3.5–5)
RBC # BLD: 4.96 E12/L (ref 3.8–5.8)
SODIUM BLD-SCNC: 146 MMOL/L (ref 132–146)
TOTAL PROTEIN: 7.5 G/DL (ref 6.4–8.3)
TRIGL SERPL-MCNC: 93 MG/DL (ref 0–149)
TSH SERPL DL<=0.05 MIU/L-ACNC: 4.44 UIU/ML (ref 0.27–4.2)
VITAMIN D 25-HYDROXY: 99 NG/ML (ref 30–100)
VLDLC SERPL CALC-MCNC: 19 MG/DL
WBC # BLD: 11.1 E9/L (ref 4.5–11.5)

## 2021-01-01 PROCEDURE — 2500000003 HC RX 250 WO HCPCS: Performed by: INTERNAL MEDICINE

## 2021-01-01 PROCEDURE — 99213 OFFICE O/P EST LOW 20 MIN: CPT | Performed by: NURSE PRACTITIONER

## 2021-01-01 PROCEDURE — 6370000000 HC RX 637 (ALT 250 FOR IP): Performed by: INTERNAL MEDICINE

## 2021-01-01 PROCEDURE — 69210 REMOVE IMPACTED EAR WAX UNI: CPT | Performed by: NURSE PRACTITIONER

## 2021-01-01 PROCEDURE — 1123F ACP DISCUSS/DSCN MKR DOCD: CPT | Performed by: NURSE PRACTITIONER

## 2021-01-01 PROCEDURE — 74246 X-RAY XM UPR GI TRC 2CNTRST: CPT

## 2021-01-01 PROCEDURE — 4040F PNEUMOC VAC/ADMIN/RCVD: CPT | Performed by: NURSE PRACTITIONER

## 2021-01-01 PROCEDURE — G8427 DOCREV CUR MEDS BY ELIG CLIN: HCPCS | Performed by: NURSE PRACTITIONER

## 2021-01-01 PROCEDURE — 1036F TOBACCO NON-USER: CPT | Performed by: NURSE PRACTITIONER

## 2021-01-01 PROCEDURE — G8417 CALC BMI ABV UP PARAM F/U: HCPCS | Performed by: NURSE PRACTITIONER

## 2021-01-01 RX ORDER — AZELASTINE 1 MG/ML
1-2 SPRAY, METERED NASAL 2 TIMES DAILY PRN
Qty: 1 BOTTLE | Refills: 3 | Status: SHIPPED
Start: 2021-01-01 | End: 2022-01-01 | Stop reason: ALTCHOICE

## 2021-01-01 RX ORDER — OMEPRAZOLE 20 MG/1
CAPSULE, DELAYED RELEASE ORAL
COMMUNITY
Start: 2021-01-01

## 2021-01-01 RX ORDER — FORMOTEROL FUMARATE 20 UG/2ML
SOLUTION RESPIRATORY (INHALATION)
COMMUNITY
Start: 2021-01-01

## 2021-01-01 RX ORDER — BUDESONIDE 0.5 MG/2ML
INHALANT ORAL
COMMUNITY
Start: 2021-01-01

## 2021-01-01 RX ADMIN — ANTACID/ANTIFLATULENT 1 EACH: 380; 550; 10; 10 GRANULE, EFFERVESCENT ORAL at 08:34

## 2021-01-01 RX ADMIN — BARIUM SULFATE 340 G: 980 POWDER, FOR SUSPENSION ORAL at 08:34

## 2021-01-01 RX ADMIN — BARIUM SULFATE 176 G: 960 POWDER, FOR SUSPENSION ORAL at 08:33

## 2021-01-01 ASSESSMENT — ENCOUNTER SYMPTOMS
EYES NEGATIVE: 1
RESPIRATORY NEGATIVE: 1
SHORTNESS OF BREATH: 0
RHINORRHEA: 0
STRIDOR: 0

## 2021-01-11 ENCOUNTER — OFFICE VISIT (OUTPATIENT)
Dept: ORTHOPEDIC SURGERY | Age: 81
End: 2021-01-11
Payer: MEDICARE

## 2021-01-11 VITALS — WEIGHT: 185 LBS | HEIGHT: 70 IN | BODY MASS INDEX: 26.48 KG/M2 | TEMPERATURE: 98.6 F

## 2021-01-11 DIAGNOSIS — M75.41 SHOULDER IMPINGEMENT, RIGHT: ICD-10-CM

## 2021-01-11 DIAGNOSIS — M19.011 GLENOHUMERAL ARTHRITIS, RIGHT: Primary | ICD-10-CM

## 2021-01-11 PROCEDURE — 1036F TOBACCO NON-USER: CPT | Performed by: ORTHOPAEDIC SURGERY

## 2021-01-11 PROCEDURE — 4040F PNEUMOC VAC/ADMIN/RCVD: CPT | Performed by: ORTHOPAEDIC SURGERY

## 2021-01-11 PROCEDURE — 99212 OFFICE O/P EST SF 10 MIN: CPT | Performed by: ORTHOPAEDIC SURGERY

## 2021-01-11 PROCEDURE — G8427 DOCREV CUR MEDS BY ELIG CLIN: HCPCS | Performed by: ORTHOPAEDIC SURGERY

## 2021-01-11 PROCEDURE — G8417 CALC BMI ABV UP PARAM F/U: HCPCS | Performed by: ORTHOPAEDIC SURGERY

## 2021-01-11 PROCEDURE — 1123F ACP DISCUSS/DSCN MKR DOCD: CPT | Performed by: ORTHOPAEDIC SURGERY

## 2021-01-11 PROCEDURE — G8484 FLU IMMUNIZE NO ADMIN: HCPCS | Performed by: ORTHOPAEDIC SURGERY

## 2021-01-11 RX ORDER — OXYCODONE HYDROCHLORIDE AND ACETAMINOPHEN 5; 325 MG/1; MG/1
1 TABLET ORAL EVERY 6 HOURS PRN
Qty: 28 TABLET | Refills: 0 | Status: SHIPPED | OUTPATIENT
Start: 2021-01-11 | End: 2021-01-18

## 2021-01-11 NOTE — PROGRESS NOTES
Chief Complaint   Patient presents with    Shoulder Pain     Right shoulder arthroscopy follow up. DOS 9/9/2020. Shoulder feeling good. Only hurts doing certain movements. Susana Pelletier returns today for follow up of his right shoulder arthroscopy 9/9/2020.  he reports that the pain in the shoulder is better. he has been going to physical therapy. he is also complaining of some intermittent pain. The patient is right dominant. The patient's pain level is a 4/10. The patient did respond to treatment. Past Medical History:   Diagnosis Date    COPD (chronic obstructive pulmonary disease) ([address]     Hypertension     Oxygen dependent     2 l doesnt use continuous     Past Surgical History:   Procedure Laterality Date    HERNIA REPAIR      age 61   [facility] SHOULDER ARTHROSCOPY Right 9/9/2020    RIGHT SHOULDER ARTHROSCOPY, SUBACROMIAL DECOMPRESSION,  DEBRIDEMENT LABRIUM AND ROTATOR CUFF, CHONDROPLASTY (ARTHREX) performed by Al Holden DO at [address]       Current Outpatient Medications:     oxyCODONE-acetaminophen (PERCOCET) 5-325 MG per tablet, Take 1 tablet by mouth every 6 hours as needed for Pain for up to 7 days. Intended supply: 7 days.  Take lowest dose possible to manage pain, Disp: 28 tablet, Rfl: 0    fluticasone (FLONASE) 50 MCG/ACT nasal spray, 2 sprays by Each Nostril route daily, Disp: 2 Bottle, Rfl: 5    OXYGEN, Inhale 2 L into the lungs intermittent, Disp: , Rfl:     Cyanocobalamin (VITAMIN B 12 PO), Take by mouth daily, Disp: , Rfl:     Cholecalciferol (VITAMIN D3) 50 MCG (2000 UT) TABS, Take 2 tablets by mouth daily, Disp: , Rfl:     lisinopril-hydroCHLOROthiazide (PRINZIDE;ZESTORETIC) 20-12.5 MG per tablet, TAKE ONE TABLET BY MOUTH DAILY, Disp: 90 tablet, Rfl: 1    aspirin 81 MG EC tablet, Take 81 mg by mouth daily Last dose 2 weeks ago, Disp: , Rfl:     fluticasone-umeclidin-vilant (TRELEGY ELLIPTA) 100-62.5-25 MCG/INH AEPB, Inhale 1 puff into the lungs daily, Disp: , Rfl:   albuterol (PROVENTIL) (2.5 MG/3ML) 0.083% nebulizer solution, INHALE THE CONTENTS OF 1 VIAL (3 ML) VIA NEBULIZER 4 TIMES DAILY AS NEEDED WHEN COUGHING  WHEEZING  OR SHORT OF BREATH, Disp: , Rfl:     albuterol sulfate  (90 Base) MCG/ACT inhaler, Inhale 2 puffs into the lungs 4 times daily as needed for Wheezing, Disp: 1 Inhaler, Rfl: 5  Allergies   Allergen Reactions    Penicillins Hives     Long ago     Social History     Socioeconomic History    Marital status:      Spouse name: Not on file    Number of children: Not on file    Years of education: Not on file    Highest education level: Not on file   Occupational History    Not on file   Social Needs    Financial resource strain: Not on file    Food insecurity     Worry: Not on file     Inability: Not on file    Transportation needs     Medical: Not on file     Non-medical: Not on file   Tobacco Use    Smoking status: Former Smoker     Packs/day: 1.00     Years: 60.00     Pack years: 60.00     Quit date: 2016     Years since quittin.1    Smokeless tobacco: Never Used   Substance and Sexual Activity    Alcohol use: No    Drug use: No    Sexual activity: Not on file   Lifestyle    Physical activity     Days per week: Not on file     Minutes per session: Not on file    Stress: Not on file   Relationships    Social connections     Talks on phone: Not on file     Gets together: Not on file     Attends Anabaptist service: Not on file     Active member of club or organization: Not on file     Attends meetings of clubs or organizations: Not on file     Relationship status: Not on file    Intimate partner violence     Fear of current or ex partner: Not on file     Emotionally abused: Not on file     Physically abused: Not on file     Forced sexual activity: Not on file   Other Topics Concern    Not on file   Social History Narrative    Not on file     No family history on file.     REVIEW OF SYSTEMS: General/Constitution:  (-)weight loss, (-)fever, (-)chills, (-)weakness. Skin: (-) rash,(-) psoriasis,(-) eczema, (-)skin cancer. Musculoskeletal: (-) fractures,  (-) dislocations,(-) collagen vascular disease, (-) fibromyalgia, (-) multiple sclerosis, (-) muscular dystrophy, (-) RSD,(-) joint pain (-)swelling, (-) joint pain,swelling. Neurologic: (-) epilepsy, (-)seizures,(-) brain tumor,(-) TIA, (-)stroke, (-)headaches, (-)Parkinson disease,(-) memory loss, (-) LOC. Cardiovascular: (-) Chest pain, (-) swelling in legs/feet, (-) SOB, (-) cramping in legs/feet with walking. Respiratory: (-) SOB, (-) Coughing, (-) night sweats. GI: (-) nausea, (-) vomiting, (-) diarrhea, (-) blood in stool, (-) gastric ulcer. Psychiatric: (-) Depression, (-) Anxiety, (-) bipolar disease, (-) Alzheimer's Disease  Allergic/Immunologic: (-) allergies latex, (-) allergies metal, (-) skin sensitivity. Hematlogic: (-) anemia, (-) blood transfusion, (-) DVT/PE, (-) Clotting disorders    SUBJECTIVE:    Constitution:  The patient is alert and oriented x 3, appears to be stated age and in no distress. Temp 98.6 °F (37 °C)   Ht 5' 10\" (1.778 m)   Wt 185 lb (83.9 kg)   BMI 26.54 kg/m²       Skin:  Upon inspection: the skin appears warm, dry and intact. There is not a previous scar over the affected area. There is not any cellulitis, lymphedema or cutaneous lesions noted in the lower extremities. Upon palpation there is no induration noted. Neurologic:  Gait: normal;  Motor exam of the upper extremities show: The reflexes in biceps/triceps/brachioradialis are equal and symmetric. Sensory exam C5-T1 are normal bilaterally. Cardiovascular: The vascular exam is normal and is well perfused to distal extremities. There are 2+ radial pulses bilaterally, and motor and sensation is intact to median, ulnar, and radial, musclocutaneus, and axillary nerve distribution and grossly symmetric bilaterally. There is cap refill noted less than two seconds in all digits. There is not edema of the bilateral upper extremities. There is not varicosities noted in the distal extremities. Lymph:  Upon palpation,  there is no lymphadenopathy noted in bilateral upper extremities. Musculoskeletal:  Gait: normal; examination of the nails and digits reveal no cyanosis or clubbing. Cervical Exam:  On physical exam, Svetlana Jim is well-developed, well-nourished, oriented to person, place and time. his gait is normal.  On evaluation of his cervical spine, he has full range of motion of the cervical spine without pain. There is no cervical tenderness to palpation. Shoulder Exam:   On evaluation of his bilaterally upper extremities, his right shoulder has no deformity. There is tenderness upon palpation of the anterior shoulder. There is not evidence of scapular dyskinesis. There is not muscle atrophy in shoulder girdle. The range of motion for the Right Shoulder is 140/40/t10 and for the Left shoulder is 150/50/t8. Right shoulder Motor strength is 5/5 in the supraspinatus, 5/5 internal rotation and 5/5 in external rotation, and Left shoulder motor strength 5/5 in supraspinatus, 5/5 in internal rotation, 5/5 in external rotation. Right shoulder:  negative Impingement , negative Meraz ,negative  Speeds,negative  Apprehension ,negative Sheehan Load Shift, negative Nhi manuver, negative Cross arm test.     Left shoulder:  negative Impingement , negative Meraz ,negative  Speeds,negative  Apprehension ,negative Sheehan Load Shift, negative Nhi manuver, negative Cross arm test.     X-ray:  None today    MRI:  n/a    Radiographic findings reviewed with patient        Impression:       Encounter Diagnoses   Name Primary?  Shoulder impingement, right     Glenohumeral arthritis, right Yes       Plan:  Natural history and expected course discussed. Questions answered. Educational material distributed. Reduction in offending activity. Gentle ROM exercises  RICE therapy.    Percocet refilled  Hep  Fu prn

## 2021-04-20 PROBLEM — J42 CHRONIC BRONCHITIS (HCC): Status: ACTIVE | Noted: 2021-01-01

## 2021-06-21 NOTE — PROGRESS NOTES
[address] [facility] Otolaryngology  Dr. Bella Listen. Rosa Wilson. Ms.Ed        Patient Name:  Perez Monday  :  1940     CHIEF C/O:    Chief Complaint   Patient presents with    Allergies     6mo. routine     Ear Problem     routine cleaning        HISTORY OBTAINED FROM:  patient, spouse    HISTORY OF PRESENT ILLNESS:       Dano Mccann is a 79y.o. year old male, here today for follow up of allergy symptoms and cerumen impactions. Last seen 6 months ago  Using flonase intermittnetly, some relief  Worse days with increased pollen. Mostly post nasal drainage with mild congestion  No sore throat or change to voice. Ears feel mildly full  Has been using debrox for past 5 days  Hearing is muffled  Needs to be looking at people when they are speaking to him.         Past Medical History:   Diagnosis Date    COPD (chronic obstructive pulmonary disease) ([facility] [address]     Hypertension     Oxygen dependent     2 l doesnt use continuous     Past Surgical History:   Procedure Laterality Date    HERNIA REPAIR      age 61   [facility] SHOULDER [facility] Right 2020    RIGHT SHOULDER ARTHROSCOPY, SUBACROMIAL DECOMPRESSION,  DEBRIDEMENT LABRIUM AND ROTATOR CUFF, CHONDROPLASTY (ARTHREX) performed by Bossman Carroll DO at [address]       Current Outpatient Medications:     omeprazole (PRILOSEC) 20 MG delayed release capsule, TAKE ONE CAPSULE BY MOUTH EVERY MORNING, Disp: , Rfl:     lisinopril-hydroCHLOROthiazide (PRINZIDE;ZESTORETIC) 20-12.5 MG per tablet, TAKE ONE TABLET BY MOUTH DAILY, Disp: 90 tablet, Rfl: 0    fluticasone (FLONASE) 50 MCG/ACT nasal spray, 2 sprays by Each Nostril route daily, Disp: 2 Bottle, Rfl: 5    Cyanocobalamin (VITAMIN B 12 PO), Take by mouth daily, Disp: , Rfl:     Cholecalciferol (VITAMIN D3) 50 MCG ( UT) TABS, Take 2 tablets by mouth daily, Disp: , Rfl:     fluticasone-umeclidin-vilant (TRELEGY ELLIPTA) 100-62.5-25 MCG/INH AEPB, Inhale 1 puff into the lungs daily, Disp: , Rfl:     albuterol (PROVENTIL) (2.5 MG/3ML) 0.083% nebulizer solution, INHALE THE CONTENTS OF 1 VIAL (3 ML) VIA NEBULIZER 4 TIMES DAILY AS NEEDED WHEN COUGHING  WHEEZING  OR SHORT OF BREATH, Disp: , Rfl:     albuterol sulfate  (90 Base) MCG/ACT inhaler, Inhale 2 puffs into the lungs 4 times daily as needed for Wheezing, Disp: 1 Inhaler, Rfl: 5    OXYGEN, Inhale 2 L into the lungs intermittent, Disp: , Rfl:     aspirin 81 MG EC tablet, Take 81 mg by mouth daily Last dose 2 weeks ago (Patient not taking: Reported on 2021), Disp: , Rfl:   Penicillins  Social History     Tobacco Use    Smoking status: Former Smoker     Packs/day: 1.00     Years: 60.00     Pack years: 60.00     Quit date: 2016     Years since quittin.5    Smokeless tobacco: Never Used   Vaping Use    Vaping Use: Never used   Substance Use Topics    Alcohol use: No    Drug use: No     History reviewed. No pertinent family history. Review of Systems   Constitutional: Negative. Negative for activity change and appetite change. HENT: Positive for hearing loss. Negative for congestion, postnasal drip and rhinorrhea. Eyes: Negative. Respiratory: Negative. Negative for shortness of breath and stridor. Cardiovascular: Negative. Negative for chest pain and palpitations. Endocrine: Negative. Musculoskeletal: Negative. Skin: Negative. Neurological: Negative. Negative for dizziness. Hematological: Negative. Psychiatric/Behavioral: Negative. /80 (Site: Left Upper Arm, Position: Sitting, Cuff Size: Medium Adult)   Pulse 82   Ht 5' 10.5\" (1.791 m)   Wt 178 lb (80.7 kg)   BMI 25.18 kg/m²   Physical Exam  Constitutional:       Appearance: Normal appearance. HENT:      Head: Normocephalic. Right Ear: Tympanic membrane and external ear normal. Decreased hearing noted. There is impacted cerumen. Left Ear: Tympanic membrane and external ear normal. Decreased hearing noted. There is impacted cerumen.       Ears: Nose: Nose normal. No rhinorrhea. Right Turbinates: Pale. Not swollen. Left Turbinates: Pale. Not swollen. Mouth/Throat:      Lips: Pink. Pharynx: Oropharynx is clear. Eyes:      Conjunctiva/sclera: Conjunctivae normal.      Pupils: Pupils are equal, round, and reactive to light. Cardiovascular:      Rate and Rhythm: Normal rate and regular rhythm. Pulses: Normal pulses. Pulmonary:      Effort: Pulmonary effort is normal. No respiratory distress. Breath sounds: No stridor. Musculoskeletal:         General: Normal range of motion. Cervical back: Normal range of motion. No rigidity. No muscular tenderness. Skin:     General: Skin is warm and dry. Neurological:      General: No focal deficit present. Mental Status: He is alert and oriented to person, place, and time. Psychiatric:         Mood and Affect: Mood normal.         Behavior: Behavior normal.         Thought Content: Thought content normal.         Judgment: Judgment normal.         IMPRESSION/PLAN:  Cerumen removal     Auditory canal(s) both ears completely obstructed with cerumen. Cerumen was gently removed using soft plastic curette, gentle irrigation. Tympanic membranes are intact following the procedure. Auditory canals appear normal.      Tony Arce was seen today for allergies and ear problem. Diagnoses and all orders for this visit:    Bilateral impacted cerumen  -     IL REMOVAL IMPACTED CERUMEN INSTRUMENTATION UNILAT    Allergic rhinitis, unspecified seasonality, unspecified trigger    Other orders  -     azelastine (ASTELIN) 0.1 % nasal spray; 1-2 sprays by Nasal route 2 times daily as needed for Rhinitis Use in each nostril as directed        Bilateral cerumen impactions removed without difficulty. Patient will continue with his Astelin spray, 1 to 2 sprays up to twice daily as needed for allergic rhinitis and postnasal drainage symptoms. He will follow-up in 6 months.   Is instructed to call with any new or worsening symptoms prior to his next appointment.         Caroline Quinteros, TRICIA, FNP-C  [address] [facility], Nose and Throat    The information contained in this note has been dictated using drug and medical speech recognition software and may contain errors

## 2021-12-21 NOTE — PROGRESS NOTES
Subjective:      Patient ID:  Christopher Noyola is a 79 y.o. male. HPI:    Pt presents with a history of cerumen impaction removal.   The patients ear was last cleaned 6 month(s) ago. The patient was using ear drops to loosen wax immediately prior to this visit. Hearing aids: no      Past Medical History:   Diagnosis Date    COPD (chronic obstructive pulmonary disease) ([address]     Hypertension     Oxygen dependent     2 l doesnt use continuous     Past Surgical History:   Procedure Laterality Date    HERNIA REPAIR      age 61   [employer] SHOULDER ARTHROSCOPY Right 2020    RIGHT SHOULDER ARTHROSCOPY, SUBACROMIAL DECOMPRESSION,  DEBRIDEMENT LABRIUM AND ROTATOR CUFF, CHONDROPLASTY (ARTHREX) performed by Ines Walker DO at [address] [place] [address]. No pertinent family history. Social History     Socioeconomic History    Marital status:      Spouse name: None    Number of children: None    Years of education: None    Highest education level: None   Occupational History    None   Tobacco Use    Smoking status: Former Smoker     Packs/day: 1.00     Years: 60.00     Pack years: 60.00     Quit date: 2016     Years since quittin.1    Smokeless tobacco: Never Used   Vaping Use    Vaping Use: Never used   Substance and Sexual Activity    Alcohol use: No    Drug use: No    Sexual activity: None   Other Topics Concern    None   Social History Narrative    None     Social Determinants of Health     Financial Resource Strain: Low Risk     Difficulty of Paying Living Expenses: Not hard at all   Food Insecurity: No Food Insecurity    Worried About Running Out of Food in the Last Year: Never true    [place] of Food in the Last Year: Never true   Transportation Needs: No Transportation Needs    Lack of Transportation (Medical): No    Lack of Transportation (Non-Medical):  No   Physical Activity:     Days of Exercise per Week: Not on file    Minutes of Exercise per Session: Not on file Stress:     Feeling of Stress : Not on file   Social Connections:     Frequency of Communication with Friends and Family: Not on file    Frequency of Social Gatherings with Friends and Family: Not on file    Attends Christianity Services: Not on file    Active Member of Clubs or Organizations: Not on file    Attends Club or Organization Meetings: Not on file    Marital Status: Not on file   Intimate Partner Violence:     Fear of Current or Ex-Partner: Not on file    Emotionally Abused: Not on file    Physically Abused: Not on file    Sexually Abused: Not on file   Housing Stability:     Unable to Pay for Housing in the Last Year: Not on file    Number of [place] in the Last Year: Not on file    Unstable Housing in the Last Year: Not on file     Allergies   Allergen Reactions    Penicillins Hives     Long ago       Review of Systems   HENT: Positive for hearing loss. Objective: There were no vitals filed for this visit. Physical Exam  HENT:      Right Ear: Tympanic membrane, ear canal and external ear normal. There is impacted cerumen. Left Ear: Tympanic membrane, ear canal and external ear normal. There is impacted cerumen. Ears:               Cerumen removal     Auditory canal(s) both ears completely obstructed with cerumen. A microscope was used . Cerumen was gently removed using suction. Tympanic membranes are intact following the procedure. Auditory canals appear normal.            Assessment:       Diagnosis Orders   1. Bilateral impacted cerumen  NV REMOVAL IMPACTED CERUMEN INSTRUMENTATION UNILAT              Plan:      Bilateral cerumen impactions removed without difficulty. Patient tolerated well and states improved hearing following completion. He will continue with [facility] several times monthly and for 3 to 5 days prior to his next appointment. Follow up in 6 month(s).   He is instructed to call with any new or worsening symptoms prior to his next appointment.

## 2022-01-01 ENCOUNTER — APPOINTMENT (OUTPATIENT)
Dept: GENERAL RADIOLOGY | Age: 82
DRG: 871 | End: 2022-01-01
Payer: MEDICARE

## 2022-01-01 ENCOUNTER — HOSPITAL ENCOUNTER (INPATIENT)
Age: 82
LOS: 4 days | Discharge: HOME | DRG: 177 | End: 2022-03-27
Attending: EMERGENCY MEDICINE | Admitting: INTERNAL MEDICINE
Payer: MEDICARE

## 2022-01-01 ENCOUNTER — CARE COORDINATION (OUTPATIENT)
Dept: CASE MANAGEMENT | Age: 82
End: 2022-01-01

## 2022-01-01 ENCOUNTER — APPOINTMENT (OUTPATIENT)
Dept: CT IMAGING | Age: 82
DRG: 871 | End: 2022-01-01
Payer: MEDICARE

## 2022-01-01 ENCOUNTER — APPOINTMENT (OUTPATIENT)
Dept: GENERAL RADIOLOGY | Age: 82
DRG: 177 | End: 2022-01-01
Payer: MEDICARE

## 2022-01-01 ENCOUNTER — TELEPHONE (OUTPATIENT)
Dept: PHARMACY | Facility: CLINIC | Age: 82
End: 2022-01-01

## 2022-01-01 ENCOUNTER — HOSPITAL ENCOUNTER (INPATIENT)
Age: 82
LOS: 9 days | DRG: 871 | End: 2022-04-09
Attending: EMERGENCY MEDICINE | Admitting: INTERNAL MEDICINE
Payer: MEDICARE

## 2022-01-01 ENCOUNTER — APPOINTMENT (OUTPATIENT)
Dept: ULTRASOUND IMAGING | Age: 82
DRG: 871 | End: 2022-01-01
Payer: MEDICARE

## 2022-01-01 VITALS
SYSTOLIC BLOOD PRESSURE: 80 MMHG | BODY MASS INDEX: 23.95 KG/M2 | OXYGEN SATURATION: 96 % | TEMPERATURE: 95.4 F | HEIGHT: 71 IN | DIASTOLIC BLOOD PRESSURE: 54 MMHG | HEART RATE: 71 BPM | RESPIRATION RATE: 18 BRPM | WEIGHT: 171.08 LBS

## 2022-01-01 VITALS
HEIGHT: 70 IN | SYSTOLIC BLOOD PRESSURE: 123 MMHG | BODY MASS INDEX: 25.6 KG/M2 | HEART RATE: 76 BPM | DIASTOLIC BLOOD PRESSURE: 68 MMHG | RESPIRATION RATE: 17 BRPM | TEMPERATURE: 97.5 F | OXYGEN SATURATION: 95 % | WEIGHT: 178.8 LBS

## 2022-01-01 DIAGNOSIS — U07.1 COVID-19: ICD-10-CM

## 2022-01-01 DIAGNOSIS — J98.4 CAVITARY LESION OF LUNG: ICD-10-CM

## 2022-01-01 DIAGNOSIS — U07.1 COVID-19 VIRUS INFECTION: ICD-10-CM

## 2022-01-01 DIAGNOSIS — N18.9 CHRONIC RENAL IMPAIRMENT, UNSPECIFIED CKD STAGE: ICD-10-CM

## 2022-01-01 DIAGNOSIS — J44.1 COPD EXACERBATION (HCC): Primary | ICD-10-CM

## 2022-01-01 DIAGNOSIS — N18.9 CHRONIC KIDNEY DISEASE, UNSPECIFIED CKD STAGE: ICD-10-CM

## 2022-01-01 DIAGNOSIS — J96.01 ACUTE RESPIRATORY FAILURE WITH HYPOXIA (HCC): Primary | ICD-10-CM

## 2022-01-01 LAB
(1,3)-BETA-D-GLUCAN (FUNGITELL) INTERPRETATION: ABNORMAL
(1,3)-BETA-D-GLUCAN (FUNGITELL): 70 PG/ML
AADO2: 389.4 MMHG
AADO2: 402.6 MMHG
AADO2: 471.1 MMHG
AADO2: 481.4 MMHG
AADO2: 484.4 MMHG
AADO2: 499.4 MMHG
ADENOVIRUS BY PCR: NOT DETECTED
ALBUMIN SERPL-MCNC: 2.1 G/DL (ref 3.5–5.2)
ALBUMIN SERPL-MCNC: 2.4 G/DL (ref 3.5–5.2)
ALBUMIN SERPL-MCNC: 2.6 G/DL (ref 3.5–5.2)
ALBUMIN SERPL-MCNC: 2.6 G/DL (ref 3.5–5.2)
ALBUMIN SERPL-MCNC: 2.9 G/DL (ref 3.5–5.2)
ALBUMIN SERPL-MCNC: 3 G/DL (ref 3.5–5.2)
ALBUMIN SERPL-MCNC: 3 G/DL (ref 3.5–5.2)
ALBUMIN SERPL-MCNC: 3.2 G/DL (ref 3.5–5.2)
ALBUMIN SERPL-MCNC: 3.3 G/DL (ref 3.5–5.2)
ALBUMIN SERPL-MCNC: 3.5 G/DL (ref 3.5–5.2)
ALP BLD-CCNC: 50 U/L (ref 40–129)
ALP BLD-CCNC: 53 U/L (ref 40–129)
ALP BLD-CCNC: 55 U/L (ref 40–129)
ALP BLD-CCNC: 56 U/L (ref 40–129)
ALP BLD-CCNC: 57 U/L (ref 40–129)
ALP BLD-CCNC: 60 U/L (ref 40–129)
ALP BLD-CCNC: 63 U/L (ref 40–129)
ALP BLD-CCNC: 65 U/L (ref 40–129)
ALP BLD-CCNC: 70 U/L (ref 40–129)
ALP BLD-CCNC: 72 U/L (ref 40–129)
ALP BLD-CCNC: 74 U/L (ref 40–129)
ALP BLD-CCNC: 76 U/L (ref 40–129)
ALT SERPL-CCNC: 18 U/L (ref 0–40)
ALT SERPL-CCNC: 20 U/L (ref 0–40)
ALT SERPL-CCNC: 34 U/L (ref 0–40)
ALT SERPL-CCNC: 36 U/L (ref 0–40)
ALT SERPL-CCNC: 37 U/L (ref 0–40)
ALT SERPL-CCNC: 40 U/L (ref 0–40)
ALT SERPL-CCNC: 45 U/L (ref 0–40)
ALT SERPL-CCNC: 59 U/L (ref 0–40)
ALT SERPL-CCNC: 63 U/L (ref 0–40)
ALT SERPL-CCNC: 69 U/L (ref 0–40)
ANION GAP SERPL CALCULATED.3IONS-SCNC: 10 MMOL/L (ref 7–16)
ANION GAP SERPL CALCULATED.3IONS-SCNC: 11 MMOL/L (ref 7–16)
ANION GAP SERPL CALCULATED.3IONS-SCNC: 12 MMOL/L (ref 7–16)
ANION GAP SERPL CALCULATED.3IONS-SCNC: 13 MMOL/L (ref 7–16)
ANION GAP SERPL CALCULATED.3IONS-SCNC: 13 MMOL/L (ref 7–16)
ANION GAP SERPL CALCULATED.3IONS-SCNC: 14 MMOL/L (ref 7–16)
ANION GAP SERPL CALCULATED.3IONS-SCNC: 15 MMOL/L (ref 7–16)
ANION GAP SERPL CALCULATED.3IONS-SCNC: 6 MMOL/L (ref 7–16)
ANION GAP SERPL CALCULATED.3IONS-SCNC: 7 MMOL/L (ref 7–16)
ANION GAP SERPL CALCULATED.3IONS-SCNC: 8 MMOL/L (ref 7–16)
ANION GAP SERPL CALCULATED.3IONS-SCNC: 9 MMOL/L (ref 7–16)
ANISOCYTOSIS: ABNORMAL
APTT: 28.9 SEC (ref 24.5–35.1)
ASPERGILLUS GALACTO AG: NEGATIVE
ASPERGILLUS GALACTO INDEX: 0.03
AST SERPL-CCNC: 19 U/L (ref 0–39)
AST SERPL-CCNC: 24 U/L (ref 0–39)
AST SERPL-CCNC: 27 U/L (ref 0–39)
AST SERPL-CCNC: 28 U/L (ref 0–39)
AST SERPL-CCNC: 28 U/L (ref 0–39)
AST SERPL-CCNC: 30 U/L (ref 0–39)
AST SERPL-CCNC: 31 U/L (ref 0–39)
AST SERPL-CCNC: 37 U/L (ref 0–39)
AST SERPL-CCNC: 41 U/L (ref 0–39)
AST SERPL-CCNC: 44 U/L (ref 0–39)
AST SERPL-CCNC: 47 U/L (ref 0–39)
AST SERPL-CCNC: 54 U/L (ref 0–39)
B.E.: -2.1 MMOL/L (ref -3–3)
B.E.: -2.2 MMOL/L (ref -3–3)
B.E.: -2.5 MMOL/L (ref -3–3)
B.E.: -2.9 MMOL/L (ref -3–3)
B.E.: -2.9 MMOL/L (ref -3–3)
B.E.: -3.2 MMOL/L (ref -3–3)
B.E.: -3.4 MMOL/L (ref -3–3)
BACTERIA: ABNORMAL /HPF
BACTERIA: NORMAL /HPF
BASOPHILS ABSOLUTE: 0 E9/L (ref 0–0.2)
BASOPHILS ABSOLUTE: 0.02 E9/L (ref 0–0.2)
BASOPHILS ABSOLUTE: 0.02 E9/L (ref 0–0.2)
BASOPHILS RELATIVE PERCENT: 0 % (ref 0–2)
BASOPHILS RELATIVE PERCENT: 0.1 % (ref 0–2)
BASOPHILS RELATIVE PERCENT: 0.2 % (ref 0–2)
BASOPHILS RELATIVE PERCENT: 0.3 % (ref 0–2)
BILIRUB SERPL-MCNC: 0.3 MG/DL (ref 0–1.2)
BILIRUB SERPL-MCNC: 0.3 MG/DL (ref 0–1.2)
BILIRUB SERPL-MCNC: 0.4 MG/DL (ref 0–1.2)
BILIRUB SERPL-MCNC: 0.5 MG/DL (ref 0–1.2)
BILIRUB SERPL-MCNC: 0.5 MG/DL (ref 0–1.2)
BILIRUB SERPL-MCNC: 0.7 MG/DL (ref 0–1.2)
BILIRUB SERPL-MCNC: 0.9 MG/DL (ref 0–1.2)
BILIRUBIN URINE: NEGATIVE
BILIRUBIN URINE: NEGATIVE
BLOOD CULTURE, ROUTINE: NORMAL
BLOOD CULTURE, ROUTINE: NORMAL
BLOOD, URINE: ABNORMAL
BLOOD, URINE: NEGATIVE
BORDETELLA PARAPERTUSSIS BY PCR: NOT DETECTED
BORDETELLA PERTUSSIS BY PCR: NOT DETECTED
BUN BLDV-MCNC: 25 MG/DL (ref 6–23)
BUN BLDV-MCNC: 26 MG/DL (ref 6–23)
BUN BLDV-MCNC: 26 MG/DL (ref 6–23)
BUN BLDV-MCNC: 27 MG/DL (ref 6–23)
BUN BLDV-MCNC: 28 MG/DL (ref 6–23)
BUN BLDV-MCNC: 30 MG/DL (ref 6–23)
BUN BLDV-MCNC: 31 MG/DL (ref 6–23)
BUN BLDV-MCNC: 32 MG/DL (ref 6–23)
BUN BLDV-MCNC: 33 MG/DL (ref 6–23)
BUN BLDV-MCNC: 39 MG/DL (ref 6–23)
BUN BLDV-MCNC: 43 MG/DL (ref 6–23)
BURR CELLS: ABNORMAL
C-REACTIVE PROTEIN: 0.6 MG/DL (ref 0–0.4)
C-REACTIVE PROTEIN: 2.2 MG/DL (ref 0–0.4)
C-REACTIVE PROTEIN: 2.3 MG/DL (ref 0–0.4)
C-REACTIVE PROTEIN: 2.6 MG/DL (ref 0–0.4)
C-REACTIVE PROTEIN: 4.4 MG/DL (ref 0–0.4)
C-REACTIVE PROTEIN: 4.5 MG/DL (ref 0–0.4)
C-REACTIVE PROTEIN: 7.6 MG/DL (ref 0–0.4)
CALCIUM SERPL-MCNC: 7.3 MG/DL (ref 8.6–10.2)
CALCIUM SERPL-MCNC: 7.4 MG/DL (ref 8.6–10.2)
CALCIUM SERPL-MCNC: 7.4 MG/DL (ref 8.6–10.2)
CALCIUM SERPL-MCNC: 7.6 MG/DL (ref 8.6–10.2)
CALCIUM SERPL-MCNC: 7.7 MG/DL (ref 8.6–10.2)
CALCIUM SERPL-MCNC: 7.9 MG/DL (ref 8.6–10.2)
CALCIUM SERPL-MCNC: 7.9 MG/DL (ref 8.6–10.2)
CALCIUM SERPL-MCNC: 8 MG/DL (ref 8.6–10.2)
CALCIUM SERPL-MCNC: 8.4 MG/DL (ref 8.6–10.2)
CALCIUM SERPL-MCNC: 8.5 MG/DL (ref 8.6–10.2)
CALCIUM SERPL-MCNC: 8.5 MG/DL (ref 8.6–10.2)
CALCIUM SERPL-MCNC: 8.6 MG/DL (ref 8.6–10.2)
CALCIUM SERPL-MCNC: 8.9 MG/DL (ref 8.6–10.2)
CHLAMYDOPHILIA PNEUMONIAE BY PCR: NOT DETECTED
CHLORIDE BLD-SCNC: 100 MMOL/L (ref 98–107)
CHLORIDE BLD-SCNC: 101 MMOL/L (ref 98–107)
CHLORIDE BLD-SCNC: 102 MMOL/L (ref 98–107)
CHLORIDE BLD-SCNC: 103 MMOL/L (ref 98–107)
CHLORIDE BLD-SCNC: 106 MMOL/L (ref 98–107)
CHLORIDE BLD-SCNC: 107 MMOL/L (ref 98–107)
CHLORIDE BLD-SCNC: 108 MMOL/L (ref 98–107)
CHLORIDE BLD-SCNC: 109 MMOL/L (ref 98–107)
CHLORIDE BLD-SCNC: 109 MMOL/L (ref 98–107)
CHLORIDE BLD-SCNC: 110 MMOL/L (ref 98–107)
CHLORIDE BLD-SCNC: 111 MMOL/L (ref 98–107)
CHOLESTEROL, TOTAL: 168 MG/DL (ref 0–199)
CLARITY: ABNORMAL
CLARITY: CLEAR
CO2: 17 MMOL/L (ref 22–29)
CO2: 18 MMOL/L (ref 22–29)
CO2: 19 MMOL/L (ref 22–29)
CO2: 20 MMOL/L (ref 22–29)
CO2: 22 MMOL/L (ref 22–29)
CO2: 23 MMOL/L (ref 22–29)
CO2: 24 MMOL/L (ref 22–29)
CO2: 25 MMOL/L (ref 22–29)
COHB: 0 % (ref 0–1.5)
COHB: 0.1 % (ref 0–1.5)
COHB: 0.3 % (ref 0–1.5)
COLOR: ABNORMAL
COLOR: ABNORMAL
COMMENT: NORMAL
CORONAVIRUS 229E BY PCR: NOT DETECTED
CORONAVIRUS HKU1 BY PCR: NOT DETECTED
CORONAVIRUS NL63 BY PCR: NOT DETECTED
CORONAVIRUS OC43 BY PCR: NOT DETECTED
CREAT SERPL-MCNC: 0.9 MG/DL (ref 0.7–1.2)
CREAT SERPL-MCNC: 1 MG/DL (ref 0.7–1.2)
CREAT SERPL-MCNC: 1.1 MG/DL (ref 0.7–1.2)
CREAT SERPL-MCNC: 1.3 MG/DL (ref 0.7–1.2)
CREAT SERPL-MCNC: 1.3 MG/DL (ref 0.7–1.2)
CREAT SERPL-MCNC: 1.5 MG/DL (ref 0.7–1.2)
CREAT SERPL-MCNC: 1.8 MG/DL (ref 0.7–1.2)
CREAT SERPL-MCNC: 1.9 MG/DL (ref 0.7–1.2)
CRITICAL: ABNORMAL
CULTURE, BLOOD 2: NORMAL
CULTURE, BLOOD 2: NORMAL
CULTURE, RESPIRATORY: ABNORMAL
CULTURE, RESPIRATORY: ABNORMAL
D DIMER: 2982 NG/ML DDU
D DIMER: 377 NG/ML DDU
D DIMER: 460 NG/ML DDU
D DIMER: 534 NG/ML DDU
D DIMER: 628 NG/ML DDU
D DIMER: 630 NG/ML DDU
D DIMER: 786 NG/ML DDU
D DIMER: >5250 NG/ML DDU
DATE ANALYZED: ABNORMAL
DATE OF COLLECTION: ABNORMAL
DELIVERY SYSTEMS: ABNORMAL
DEVICE: ABNORMAL
EKG ATRIAL RATE: 105 BPM
EKG ATRIAL RATE: 87 BPM
EKG P AXIS: 74 DEGREES
EKG P AXIS: 75 DEGREES
EKG P-R INTERVAL: 120 MS
EKG P-R INTERVAL: 130 MS
EKG Q-T INTERVAL: 320 MS
EKG Q-T INTERVAL: 348 MS
EKG QRS DURATION: 72 MS
EKG QRS DURATION: 76 MS
EKG QTC CALCULATION (BAZETT): 418 MS
EKG QTC CALCULATION (BAZETT): 422 MS
EKG R AXIS: 68 DEGREES
EKG R AXIS: 72 DEGREES
EKG T AXIS: 58 DEGREES
EKG T AXIS: 61 DEGREES
EKG VENTRICULAR RATE: 105 BPM
EKG VENTRICULAR RATE: 87 BPM
EOSINOPHILS ABSOLUTE: 0 E9/L (ref 0.05–0.5)
EOSINOPHILS RELATIVE PERCENT: 0 % (ref 0–6)
EPITHELIAL CELLS, UA: NORMAL /HPF
FERRITIN: 1435 NG/ML
FERRITIN: 954 NG/ML
FIBRINOGEN: 428 MG/DL (ref 225–540)
FIBRINOGEN: 509 MG/DL (ref 225–540)
FIO2: 10
FIO2: 100 %
FIO2: 75 %
FIO2: 75 %
FIO2: 90 %
GFR AFRICAN AMERICAN: 41
GFR AFRICAN AMERICAN: 44
GFR AFRICAN AMERICAN: 54
GFR AFRICAN AMERICAN: >60
GFR NON-AFRICAN AMERICAN: 34 ML/MIN/1.73
GFR NON-AFRICAN AMERICAN: 36 ML/MIN/1.73
GFR NON-AFRICAN AMERICAN: 45 ML/MIN/1.73
GFR NON-AFRICAN AMERICAN: 53 ML/MIN/1.73
GFR NON-AFRICAN AMERICAN: 53 ML/MIN/1.73
GFR NON-AFRICAN AMERICAN: >60 ML/MIN/1.73
GLUCOSE BLD-MCNC: 102 MG/DL (ref 74–99)
GLUCOSE BLD-MCNC: 111 MG/DL (ref 74–99)
GLUCOSE BLD-MCNC: 114 MG/DL (ref 74–99)
GLUCOSE BLD-MCNC: 115 MG/DL (ref 74–99)
GLUCOSE BLD-MCNC: 119 MG/DL (ref 74–99)
GLUCOSE BLD-MCNC: 121 MG/DL (ref 74–99)
GLUCOSE BLD-MCNC: 123 MG/DL (ref 74–99)
GLUCOSE BLD-MCNC: 124 MG/DL (ref 74–99)
GLUCOSE BLD-MCNC: 131 MG/DL (ref 74–99)
GLUCOSE BLD-MCNC: 149 MG/DL (ref 74–99)
GLUCOSE BLD-MCNC: 151 MG/DL (ref 74–99)
GLUCOSE BLD-MCNC: 247 MG/DL (ref 74–99)
GLUCOSE BLD-MCNC: 98 MG/DL (ref 74–99)
GLUCOSE URINE: NEGATIVE MG/DL
GLUCOSE URINE: NEGATIVE MG/DL
HCO3: 19.6 MMOL/L (ref 22–26)
HCO3: 22.6 MMOL/L (ref 22–26)
HCO3: 22.7 MMOL/L (ref 22–26)
HCO3: 23.1 MMOL/L (ref 22–26)
HCO3: 23.3 MMOL/L (ref 22–26)
HCO3: 24.6 MMOL/L (ref 22–26)
HCO3: 24.8 MMOL/L (ref 22–26)
HCT VFR BLD CALC: 32.1 % (ref 37–54)
HCT VFR BLD CALC: 34 % (ref 37–54)
HCT VFR BLD CALC: 34.4 % (ref 37–54)
HCT VFR BLD CALC: 38.9 % (ref 37–54)
HCT VFR BLD CALC: 39 % (ref 37–54)
HCT VFR BLD CALC: 39.6 % (ref 37–54)
HCT VFR BLD CALC: 40.5 % (ref 37–54)
HCT VFR BLD CALC: 41.2 % (ref 37–54)
HCT VFR BLD CALC: 44.4 % (ref 37–54)
HCT VFR BLD CALC: 44.4 % (ref 37–54)
HCT VFR BLD CALC: 44.7 % (ref 37–54)
HCT VFR BLD CALC: 46.3 % (ref 37–54)
HCT VFR BLD CALC: 50.2 % (ref 37–54)
HDLC SERPL-MCNC: 45 MG/DL
HEMOGLOBIN: 10.3 G/DL (ref 12.5–16.5)
HEMOGLOBIN: 10.5 G/DL (ref 12.5–16.5)
HEMOGLOBIN: 10.7 G/DL (ref 12.5–16.5)
HEMOGLOBIN: 12.1 G/DL (ref 12.5–16.5)
HEMOGLOBIN: 12.5 G/DL (ref 12.5–16.5)
HEMOGLOBIN: 12.8 G/DL (ref 12.5–16.5)
HEMOGLOBIN: 12.9 G/DL (ref 12.5–16.5)
HEMOGLOBIN: 13.4 G/DL (ref 12.5–16.5)
HEMOGLOBIN: 14.3 G/DL (ref 12.5–16.5)
HEMOGLOBIN: 14.4 G/DL (ref 12.5–16.5)
HEMOGLOBIN: 14.6 G/DL (ref 12.5–16.5)
HEMOGLOBIN: 14.9 G/DL (ref 12.5–16.5)
HEMOGLOBIN: 16.3 G/DL (ref 12.5–16.5)
HHB: 1.8 % (ref 0–5)
HHB: 2.7 % (ref 0–5)
HHB: 4.5 % (ref 0–5)
HHB: 6 % (ref 0–5)
HHB: 7 % (ref 0–5)
HHB: 8.8 % (ref 0–5)
HISTOPLASMA ANTIGEN URINE INTERP: NOT DETECTED
HISTOPLASMA ANTIGEN URINE: NOT DETECTED NG/ML
HUMAN METAPNEUMOVIRUS BY PCR: NOT DETECTED
HUMAN RHINOVIRUS/ENTEROVIRUS BY PCR: NOT DETECTED
IMMATURE GRANULOCYTES #: 0.03 E9/L
IMMATURE GRANULOCYTES #: 0.19 E9/L
IMMATURE GRANULOCYTES %: 0.5 % (ref 0–5)
IMMATURE GRANULOCYTES %: 1.6 % (ref 0–5)
INFLUENZA A BY PCR: NOT DETECTED
INFLUENZA B BY PCR: NOT DETECTED
INR BLD: 1.1
INR BLD: 1.2
KETONES, URINE: NEGATIVE MG/DL
KETONES, URINE: NEGATIVE MG/DL
L. PNEUMOPHILA SEROGP 1 UR AG: NORMAL
LAB: ABNORMAL
LACTATE DEHYDROGENASE: 182 U/L (ref 135–225)
LACTATE DEHYDROGENASE: 256 U/L (ref 135–225)
LACTIC ACID, SEPSIS: 1.2 MMOL/L (ref 0.5–1.9)
LACTIC ACID, SEPSIS: 1.4 MMOL/L (ref 0.5–1.9)
LACTIC ACID, SEPSIS: 1.9 MMOL/L (ref 0.5–1.9)
LACTIC ACID, SEPSIS: 2 MMOL/L (ref 0.5–1.9)
LACTIC ACID: 1.2 MMOL/L (ref 0.5–2.2)
LDL CHOLESTEROL CALCULATED: 110 MG/DL (ref 0–99)
LEUKOCYTE ESTERASE, URINE: NEGATIVE
LEUKOCYTE ESTERASE, URINE: NEGATIVE
LYMPHOCYTES ABSOLUTE: 0 E9/L (ref 1.5–4)
LYMPHOCYTES ABSOLUTE: 0 E9/L (ref 1.5–4)
LYMPHOCYTES ABSOLUTE: 0.09 E9/L (ref 1.5–4)
LYMPHOCYTES ABSOLUTE: 0.12 E9/L (ref 1.5–4)
LYMPHOCYTES ABSOLUTE: 0.16 E9/L (ref 1.5–4)
LYMPHOCYTES ABSOLUTE: 0.19 E9/L (ref 1.5–4)
LYMPHOCYTES ABSOLUTE: 0.22 E9/L (ref 1.5–4)
LYMPHOCYTES ABSOLUTE: 0.31 E9/L (ref 1.5–4)
LYMPHOCYTES ABSOLUTE: 0.32 E9/L (ref 1.5–4)
LYMPHOCYTES ABSOLUTE: 0.32 E9/L (ref 1.5–4)
LYMPHOCYTES ABSOLUTE: 0.34 E9/L (ref 1.5–4)
LYMPHOCYTES ABSOLUTE: 0.42 E9/L (ref 1.5–4)
LYMPHOCYTES RELATIVE PERCENT: 0.9 % (ref 20–42)
LYMPHOCYTES RELATIVE PERCENT: 1.8 % (ref 20–42)
LYMPHOCYTES RELATIVE PERCENT: 1.8 % (ref 20–42)
LYMPHOCYTES RELATIVE PERCENT: 1.9 % (ref 20–42)
LYMPHOCYTES RELATIVE PERCENT: 2.6 % (ref 20–42)
LYMPHOCYTES RELATIVE PERCENT: 2.7 % (ref 20–42)
LYMPHOCYTES RELATIVE PERCENT: 4.1 % (ref 20–42)
LYMPHOCYTES RELATIVE PERCENT: 5.2 % (ref 20–42)
LYMPHOCYTES RELATIVE PERCENT: 7 % (ref 20–42)
LYMPHOCYTES RELATIVE PERCENT: 7.1 % (ref 20–42)
Lab: ABNORMAL
MAGNESIUM: 1.9 MG/DL (ref 1.6–2.6)
MAGNESIUM: 2 MG/DL (ref 1.6–2.6)
MAGNESIUM: 2.1 MG/DL (ref 1.6–2.6)
MAGNESIUM: 2.2 MG/DL (ref 1.6–2.6)
MAGNESIUM: 2.2 MG/DL (ref 1.6–2.6)
MAGNESIUM: 2.4 MG/DL (ref 1.6–2.6)
MCH RBC QN AUTO: 31 PG (ref 26–35)
MCH RBC QN AUTO: 31.3 PG (ref 26–35)
MCH RBC QN AUTO: 31.3 PG (ref 26–35)
MCH RBC QN AUTO: 31.4 PG (ref 26–35)
MCH RBC QN AUTO: 31.5 PG (ref 26–35)
MCH RBC QN AUTO: 31.5 PG (ref 26–35)
MCH RBC QN AUTO: 31.6 PG (ref 26–35)
MCH RBC QN AUTO: 31.7 PG (ref 26–35)
MCH RBC QN AUTO: 31.8 PG (ref 26–35)
MCHC RBC AUTO-ENTMCNC: 30.5 % (ref 32–34.5)
MCHC RBC AUTO-ENTMCNC: 31.1 % (ref 32–34.5)
MCHC RBC AUTO-ENTMCNC: 31.5 % (ref 32–34.5)
MCHC RBC AUTO-ENTMCNC: 31.6 % (ref 32–34.5)
MCHC RBC AUTO-ENTMCNC: 32.1 % (ref 32–34.5)
MCHC RBC AUTO-ENTMCNC: 32.1 % (ref 32–34.5)
MCHC RBC AUTO-ENTMCNC: 32.2 % (ref 32–34.5)
MCHC RBC AUTO-ENTMCNC: 32.2 % (ref 32–34.5)
MCHC RBC AUTO-ENTMCNC: 32.4 % (ref 32–34.5)
MCHC RBC AUTO-ENTMCNC: 32.5 % (ref 32–34.5)
MCHC RBC AUTO-ENTMCNC: 32.5 % (ref 32–34.5)
MCHC RBC AUTO-ENTMCNC: 32.6 % (ref 32–34.5)
MCHC RBC AUTO-ENTMCNC: 32.7 % (ref 32–34.5)
MCV RBC AUTO: 100.3 FL (ref 80–99.9)
MCV RBC AUTO: 101.3 FL (ref 80–99.9)
MCV RBC AUTO: 103 FL (ref 80–99.9)
MCV RBC AUTO: 95.6 FL (ref 80–99.9)
MCV RBC AUTO: 95.9 FL (ref 80–99.9)
MCV RBC AUTO: 96.7 FL (ref 80–99.9)
MCV RBC AUTO: 97.3 FL (ref 80–99.9)
MCV RBC AUTO: 97.6 FL (ref 80–99.9)
MCV RBC AUTO: 97.6 FL (ref 80–99.9)
MCV RBC AUTO: 98.1 FL (ref 80–99.9)
MCV RBC AUTO: 98.2 FL (ref 80–99.9)
MCV RBC AUTO: 98.5 FL (ref 80–99.9)
MCV RBC AUTO: 99 FL (ref 80–99.9)
METHB: 0.4 % (ref 0–1.5)
METHB: 0.5 % (ref 0–1.5)
METHB: 0.5 % (ref 0–1.5)
MODE: ABNORMAL
MODE: AC
MONOCYTES ABSOLUTE: 0.1 E9/L (ref 0.1–0.95)
MONOCYTES ABSOLUTE: 0.1 E9/L (ref 0.1–0.95)
MONOCYTES ABSOLUTE: 0.11 E9/L (ref 0.1–0.95)
MONOCYTES ABSOLUTE: 0.14 E9/L (ref 0.1–0.95)
MONOCYTES ABSOLUTE: 0.17 E9/L (ref 0.1–0.95)
MONOCYTES ABSOLUTE: 0.22 E9/L (ref 0.1–0.95)
MONOCYTES ABSOLUTE: 0.3 E9/L (ref 0.1–0.95)
MONOCYTES ABSOLUTE: 0.3 E9/L (ref 0.1–0.95)
MONOCYTES ABSOLUTE: 0.35 E9/L (ref 0.1–0.95)
MONOCYTES ABSOLUTE: 0.49 E9/L (ref 0.1–0.95)
MONOCYTES ABSOLUTE: 0.5 E9/L (ref 0.1–0.95)
MONOCYTES ABSOLUTE: 0.95 E9/L (ref 0.1–0.95)
MONOCYTES RELATIVE PERCENT: 0.9 % (ref 2–12)
MONOCYTES RELATIVE PERCENT: 1.8 % (ref 2–12)
MONOCYTES RELATIVE PERCENT: 2.6 % (ref 2–12)
MONOCYTES RELATIVE PERCENT: 3 % (ref 2–12)
MONOCYTES RELATIVE PERCENT: 3.5 % (ref 2–12)
MONOCYTES RELATIVE PERCENT: 3.5 % (ref 2–12)
MONOCYTES RELATIVE PERCENT: 5.2 % (ref 2–12)
MONOCYTES RELATIVE PERCENT: 6.1 % (ref 2–12)
MONOCYTES RELATIVE PERCENT: 8.5 % (ref 2–12)
MRSA CULTURE ONLY: NORMAL
MYCOPLASMA PNEUMONIAE BY PCR: NOT DETECTED
NEUTROPHILS ABSOLUTE: 10.85 E9/L (ref 1.8–7.3)
NEUTROPHILS ABSOLUTE: 10.89 E9/L (ref 1.8–7.3)
NEUTROPHILS ABSOLUTE: 11.81 E9/L (ref 1.8–7.3)
NEUTROPHILS ABSOLUTE: 14.69 E9/L (ref 1.8–7.3)
NEUTROPHILS ABSOLUTE: 14.7 E9/L (ref 1.8–7.3)
NEUTROPHILS ABSOLUTE: 16.39 E9/L (ref 1.8–7.3)
NEUTROPHILS ABSOLUTE: 3.87 E9/L (ref 1.8–7.3)
NEUTROPHILS ABSOLUTE: 4.14 E9/L (ref 1.8–7.3)
NEUTROPHILS ABSOLUTE: 4.92 E9/L (ref 1.8–7.3)
NEUTROPHILS ABSOLUTE: 8.45 E9/L (ref 1.8–7.3)
NEUTROPHILS ABSOLUTE: 9.31 E9/L (ref 1.8–7.3)
NEUTROPHILS ABSOLUTE: 9.99 E9/L (ref 1.8–7.3)
NEUTROPHILS RELATIVE PERCENT: 83.6 % (ref 43–80)
NEUTROPHILS RELATIVE PERCENT: 89.6 % (ref 43–80)
NEUTROPHILS RELATIVE PERCENT: 90 % (ref 43–80)
NEUTROPHILS RELATIVE PERCENT: 92.9 % (ref 43–80)
NEUTROPHILS RELATIVE PERCENT: 93 % (ref 43–80)
NEUTROPHILS RELATIVE PERCENT: 95.6 % (ref 43–80)
NEUTROPHILS RELATIVE PERCENT: 95.7 % (ref 43–80)
NEUTROPHILS RELATIVE PERCENT: 96.5 % (ref 43–80)
NEUTROPHILS RELATIVE PERCENT: 97.3 % (ref 43–80)
NEUTROPHILS RELATIVE PERCENT: 97.4 % (ref 43–80)
NEUTROPHILS RELATIVE PERCENT: 98.2 % (ref 43–80)
NEUTROPHILS RELATIVE PERCENT: 99.1 % (ref 43–80)
NITRITE, URINE: NEGATIVE
NITRITE, URINE: POSITIVE
O2 CONTENT: 14.8 ML/DL
O2 CONTENT: 15.3 ML/DL
O2 CONTENT: 15.5 ML/DL
O2 CONTENT: 17 ML/DL
O2 CONTENT: 17.5 ML/DL
O2 CONTENT: 18.6 ML/DL
O2 SATURATION: 86.7 % (ref 92–98.5)
O2 SATURATION: 91.1 % (ref 92–98.5)
O2 SATURATION: 93 % (ref 92–98.5)
O2 SATURATION: 94 % (ref 92–98.5)
O2 SATURATION: 95.5 % (ref 92–98.5)
O2 SATURATION: 97.3 % (ref 92–98.5)
O2 SATURATION: 98.2 % (ref 92–98.5)
O2HB: 90.5 % (ref 94–97)
O2HB: 92.6 % (ref 94–97)
O2HB: 93.5 % (ref 94–97)
O2HB: 94.7 % (ref 94–97)
O2HB: 96.5 % (ref 94–97)
O2HB: 97.5 % (ref 94–97)
OPERATOR ID: 1821
OPERATOR ID: 2323
OPERATOR ID: 30
OPERATOR ID: 9689
OPERATOR ID: 9689
OPERATOR ID: ABNORMAL
OPERATOR ID: ABNORMAL
ORGANISM: ABNORMAL
OVALOCYTES: ABNORMAL
PARAINFLUENZA VIRUS 1 BY PCR: NOT DETECTED
PARAINFLUENZA VIRUS 2 BY PCR: NOT DETECTED
PARAINFLUENZA VIRUS 3 BY PCR: NOT DETECTED
PARAINFLUENZA VIRUS 4 BY PCR: NOT DETECTED
PATIENT TEMP: 37
PATIENT TEMP: 37 C
PCO2 (TEMP CORRECTED): 26.4 MMHG (ref 35–45)
PCO2: 40.9 MMHG (ref 35–45)
PCO2: 43.9 MMHG (ref 35–45)
PCO2: 45.2 MMHG (ref 35–45)
PCO2: 47.5 MMHG (ref 35–45)
PCO2: 50.8 MMHG (ref 35–45)
PCO2: 55.5 MMHG (ref 35–45)
PDW BLD-RTO: 14.3 FL (ref 11.5–15)
PDW BLD-RTO: 14.3 FL (ref 11.5–15)
PDW BLD-RTO: 14.4 FL (ref 11.5–15)
PDW BLD-RTO: 14.8 FL (ref 11.5–15)
PDW BLD-RTO: 14.9 FL (ref 11.5–15)
PDW BLD-RTO: 15 FL (ref 11.5–15)
PDW BLD-RTO: 15 FL (ref 11.5–15)
PDW BLD-RTO: 15.1 FL (ref 11.5–15)
PDW BLD-RTO: 15.3 FL (ref 11.5–15)
PDW BLD-RTO: 15.7 FL (ref 11.5–15)
PDW BLD-RTO: 15.7 FL (ref 11.5–15)
PEEP/CPAP: 10 CMH2O
PEEP/CPAP: 10 CMH2O
PEEP/CPAP: 8 CMH2O
PFO2: 0.87 MMHG/%
PFO2: 0.87 MMHG/%
PFO2: 0.95 MMHG/%
PFO2: 1.01 MMHG/%
PFO2: 1.18 MMHG/%
PFO2: 1.45 MMHG/%
PH (TEMPERATURE CORRECTED): 7.48 (ref 7.35–7.45)
PH BLOOD GAS: 7.27 (ref 7.35–7.45)
PH BLOOD GAS: 7.3 (ref 7.35–7.45)
PH BLOOD GAS: 7.31 (ref 7.35–7.45)
PH BLOOD GAS: 7.33 (ref 7.35–7.45)
PH BLOOD GAS: 7.33 (ref 7.35–7.45)
PH BLOOD GAS: 7.36 (ref 7.35–7.45)
PH UA: 5.5 (ref 5–9)
PH UA: 5.5 (ref 5–9)
PHOSPHORUS: 2.5 MG/DL (ref 2.5–4.5)
PHOSPHORUS: 2.8 MG/DL (ref 2.5–4.5)
PHOSPHORUS: 3.3 MG/DL (ref 2.5–4.5)
PHOSPHORUS: 3.5 MG/DL (ref 2.5–4.5)
PLATELET # BLD: 124 E9/L (ref 130–450)
PLATELET # BLD: 124 E9/L (ref 130–450)
PLATELET # BLD: 125 E9/L (ref 130–450)
PLATELET # BLD: 138 E9/L (ref 130–450)
PLATELET # BLD: 173 E9/L (ref 130–450)
PLATELET # BLD: 173 E9/L (ref 130–450)
PLATELET # BLD: 188 E9/L (ref 130–450)
PLATELET # BLD: 291 E9/L (ref 130–450)
PLATELET # BLD: 294 E9/L (ref 130–450)
PLATELET # BLD: 300 E9/L (ref 130–450)
PLATELET # BLD: 304 E9/L (ref 130–450)
PLATELET # BLD: 320 E9/L (ref 130–450)
PLATELET # BLD: 333 E9/L (ref 130–450)
PMV BLD AUTO: 10.2 FL (ref 7–12)
PMV BLD AUTO: 10.3 FL (ref 7–12)
PMV BLD AUTO: 10.6 FL (ref 7–12)
PMV BLD AUTO: 10.6 FL (ref 7–12)
PMV BLD AUTO: 10.7 FL (ref 7–12)
PMV BLD AUTO: 10.7 FL (ref 7–12)
PMV BLD AUTO: 10.9 FL (ref 7–12)
PMV BLD AUTO: 11 FL (ref 7–12)
PO2 (TEMP CORRECTED): 47.4 MMHG (ref 60–80)
PO2: 106.2 MMHG (ref 75–100)
PO2: 144.7 MMHG (ref 75–100)
PO2: 65.4 MMHG (ref 75–100)
PO2: 76.1 MMHG (ref 75–100)
PO2: 77.9 MMHG (ref 75–100)
PO2: 85.7 MMHG (ref 75–100)
POC SOURCE: ABNORMAL
POIKILOCYTES: ABNORMAL
POLYCHROMASIA: ABNORMAL
POLYCHROMASIA: ABNORMAL
POTASSIUM SERPL-SCNC: 3.8 MMOL/L (ref 3.5–5)
POTASSIUM SERPL-SCNC: 4 MMOL/L (ref 3.5–5)
POTASSIUM SERPL-SCNC: 4 MMOL/L (ref 3.5–5)
POTASSIUM SERPL-SCNC: 4.1 MMOL/L (ref 3.5–5)
POTASSIUM SERPL-SCNC: 4.2 MMOL/L (ref 3.5–5)
POTASSIUM SERPL-SCNC: 4.3 MMOL/L (ref 3.5–5)
POTASSIUM SERPL-SCNC: 4.4 MMOL/L (ref 3.5–5)
POTASSIUM SERPL-SCNC: 4.5 MMOL/L (ref 3.5–5)
POTASSIUM SERPL-SCNC: 4.6 MMOL/L (ref 3.5–5)
POTASSIUM SERPL-SCNC: 4.7 MMOL/L (ref 3.5–5)
POTASSIUM SERPL-SCNC: 5 MMOL/L (ref 3.5–5)
PRO-BNP: 113 PG/ML (ref 0–450)
PRO-BNP: 127 PG/ML (ref 0–450)
PROCALCITONIN: 0.11 NG/ML (ref 0–0.08)
PROCALCITONIN: 0.12 NG/ML (ref 0–0.08)
PROCALCITONIN: 0.17 NG/ML (ref 0–0.08)
PROCALCITONIN: 0.25 NG/ML (ref 0–0.08)
PROTEIN UA: ABNORMAL MG/DL
PROTEIN UA: NEGATIVE MG/DL
PROTHROMBIN TIME: 13.3 SEC (ref 9.3–12.4)
PROTHROMBIN TIME: 13.4 SEC (ref 9.3–12.4)
PS: 8 CMH20
RBC # BLD: 3.26 E12/L (ref 3.8–5.8)
RBC # BLD: 3.34 E12/L (ref 3.8–5.8)
RBC # BLD: 3.39 E12/L (ref 3.8–5.8)
RBC # BLD: 3.84 E12/L (ref 3.8–5.8)
RBC # BLD: 3.97 E12/L (ref 3.8–5.8)
RBC # BLD: 4.07 E12/L (ref 3.8–5.8)
RBC # BLD: 4.09 E12/L (ref 3.8–5.8)
RBC # BLD: 4.22 E12/L (ref 3.8–5.8)
RBC # BLD: 4.55 E12/L (ref 3.8–5.8)
RBC # BLD: 4.59 E12/L (ref 3.8–5.8)
RBC # BLD: 4.66 E12/L (ref 3.8–5.8)
RBC # BLD: 4.72 E12/L (ref 3.8–5.8)
RBC # BLD: 5.25 E12/L (ref 3.8–5.8)
RBC UA: ABNORMAL /HPF (ref 0–2)
RBC UA: NORMAL /HPF (ref 0–2)
REPORT: NORMAL
RESPIRATORY SYNCYTIAL VIRUS BY PCR: NOT DETECTED
RI(T): 3.45
RI(T): 4.44
RI(T): 5.12
RI(T): 5.62
RI(T): 6.16
RI(T): 6.22
RR MECHANICAL: 14 B/MIN
RR MECHANICAL: 18 B/MIN
SARS-COV-2, NAAT: DETECTED
SARS-COV-2, NAAT: DETECTED
SARS-COV-2, PCR: DETECTED
SCHISTOCYTES: ABNORMAL
SEDIMENTATION RATE, ERYTHROCYTE: 28 MM/HR (ref 0–15)
SMEAR, RESPIRATORY: ABNORMAL
SODIUM BLD-SCNC: 135 MMOL/L (ref 132–146)
SODIUM BLD-SCNC: 135 MMOL/L (ref 132–146)
SODIUM BLD-SCNC: 136 MMOL/L (ref 132–146)
SODIUM BLD-SCNC: 137 MMOL/L (ref 132–146)
SODIUM BLD-SCNC: 138 MMOL/L (ref 132–146)
SODIUM BLD-SCNC: 138 MMOL/L (ref 132–146)
SODIUM BLD-SCNC: 139 MMOL/L (ref 132–146)
SODIUM BLD-SCNC: 140 MMOL/L (ref 132–146)
SODIUM BLD-SCNC: 140 MMOL/L (ref 132–146)
SODIUM BLD-SCNC: 141 MMOL/L (ref 132–146)
SOURCE, BLOOD GAS: ABNORMAL
SPECIFIC GRAVITY UA: 1.02 (ref 1–1.03)
SPECIFIC GRAVITY UA: >=1.03 (ref 1–1.03)
SPHEROCYTES: ABNORMAL
STREP PNEUMONIAE ANTIGEN, URINE: NORMAL
THB: 11.3 G/DL (ref 11.5–16.5)
THB: 11.6 G/DL (ref 11.5–16.5)
THB: 11.7 G/DL (ref 11.5–16.5)
THB: 12.7 G/DL (ref 11.5–16.5)
THB: 13.3 G/DL (ref 11.5–16.5)
THB: 13.4 G/DL (ref 11.5–16.5)
TIME ANALYZED: 1119
TIME ANALYZED: 1400
TIME ANALYZED: 437
TIME ANALYZED: 455
TIME ANALYZED: 531
TIME ANALYZED: 646
TOTAL CK: 45 U/L (ref 20–200)
TOTAL PROTEIN: 4.1 G/DL (ref 6.4–8.3)
TOTAL PROTEIN: 4.8 G/DL (ref 6.4–8.3)
TOTAL PROTEIN: 4.9 G/DL (ref 6.4–8.3)
TOTAL PROTEIN: 5 G/DL (ref 6.4–8.3)
TOTAL PROTEIN: 5.4 G/DL (ref 6.4–8.3)
TOTAL PROTEIN: 5.6 G/DL (ref 6.4–8.3)
TOTAL PROTEIN: 5.6 G/DL (ref 6.4–8.3)
TOTAL PROTEIN: 5.7 G/DL (ref 6.4–8.3)
TOTAL PROTEIN: 6.2 G/DL (ref 6.4–8.3)
TOTAL PROTEIN: 6.5 G/DL (ref 6.4–8.3)
TOTAL PROTEIN: 6.6 G/DL (ref 6.4–8.3)
TOTAL PROTEIN: 6.9 G/DL (ref 6.4–8.3)
TRIGL SERPL-MCNC: 66 MG/DL (ref 0–149)
TRIGL SERPL-MCNC: 96 MG/DL (ref 0–149)
TROPONIN, HIGH SENSITIVITY: 14 NG/L (ref 0–11)
TROPONIN, HIGH SENSITIVITY: 14 NG/L (ref 0–11)
TROPONIN, HIGH SENSITIVITY: 9 NG/L (ref 0–11)
TROPONIN, HIGH SENSITIVITY: <6 NG/L (ref 0–11)
TSH SERPL DL<=0.05 MIU/L-ACNC: 1.07 UIU/ML (ref 0.27–4.2)
URINE CULTURE, ROUTINE: NORMAL
UROBILINOGEN, URINE: 0.2 E.U./DL
UROBILINOGEN, URINE: 0.2 E.U./DL
VANCOMYCIN RANDOM: 8.6 MCG/ML (ref 5–40)
VANCOMYCIN TROUGH: 10.9 MCG/ML (ref 5–16)
VANCOMYCIN TROUGH: 19.9 MCG/ML (ref 5–16)
VITAMIN D 25-HYDROXY: >120 NG/ML (ref 30–100)
VLDLC SERPL CALC-MCNC: 13 MG/DL
VT MECHANICAL: 350 ML
WBC # BLD: 10.3 E9/L (ref 4.5–11.5)
WBC # BLD: 11 E9/L (ref 4.5–11.5)
WBC # BLD: 11.7 E9/L (ref 4.5–11.5)
WBC # BLD: 12.3 E9/L (ref 4.5–11.5)
WBC # BLD: 15 E9/L (ref 4.5–11.5)
WBC # BLD: 15.8 E9/L (ref 4.5–11.5)
WBC # BLD: 16.9 E9/L (ref 4.5–11.5)
WBC # BLD: 4.3 E9/L (ref 4.5–11.5)
WBC # BLD: 4.6 E9/L (ref 4.5–11.5)
WBC # BLD: 5.9 E9/L (ref 4.5–11.5)
WBC # BLD: 6.8 E9/L (ref 4.5–11.5)
WBC # BLD: 8.8 E9/L (ref 4.5–11.5)
WBC # BLD: 9.6 E9/L (ref 4.5–11.5)
WBC UA: ABNORMAL /HPF (ref 0–5)
WBC UA: NORMAL /HPF (ref 0–5)

## 2022-01-01 PROCEDURE — 6360000002 HC RX W HCPCS: Performed by: INTERNAL MEDICINE

## 2022-01-01 PROCEDURE — 85378 FIBRIN DEGRADE SEMIQUANT: CPT

## 2022-01-01 PROCEDURE — 80053 COMPREHEN METABOLIC PANEL: CPT

## 2022-01-01 PROCEDURE — 2500000003 HC RX 250 WO HCPCS: Performed by: INTERNAL MEDICINE

## 2022-01-01 PROCEDURE — 2500000003 HC RX 250 WO HCPCS: Performed by: NURSE PRACTITIONER

## 2022-01-01 PROCEDURE — P9047 ALBUMIN (HUMAN), 25%, 50ML: HCPCS | Performed by: INTERNAL MEDICINE

## 2022-01-01 PROCEDURE — 87449 NOS EACH ORGANISM AG IA: CPT

## 2022-01-01 PROCEDURE — 02HV33Z INSERTION OF INFUSION DEVICE INTO SUPERIOR VENA CAVA, PERCUTANEOUS APPROACH: ICD-10-PCS | Performed by: INTERNAL MEDICINE

## 2022-01-01 PROCEDURE — 94660 CPAP INITIATION&MGMT: CPT

## 2022-01-01 PROCEDURE — 85025 COMPLETE CBC W/AUTO DIFF WBC: CPT

## 2022-01-01 PROCEDURE — 86140 C-REACTIVE PROTEIN: CPT

## 2022-01-01 PROCEDURE — 84484 ASSAY OF TROPONIN QUANT: CPT

## 2022-01-01 PROCEDURE — A4216 STERILE WATER/SALINE, 10 ML: HCPCS | Performed by: INTERNAL MEDICINE

## 2022-01-01 PROCEDURE — 6370000000 HC RX 637 (ALT 250 FOR IP): Performed by: SPECIALIST

## 2022-01-01 PROCEDURE — 85651 RBC SED RATE NONAUTOMATED: CPT

## 2022-01-01 PROCEDURE — 6370000000 HC RX 637 (ALT 250 FOR IP): Performed by: INTERNAL MEDICINE

## 2022-01-01 PROCEDURE — 2500000003 HC RX 250 WO HCPCS: Performed by: SPECIALIST

## 2022-01-01 PROCEDURE — 80202 ASSAY OF VANCOMYCIN: CPT

## 2022-01-01 PROCEDURE — 94640 AIRWAY INHALATION TREATMENT: CPT

## 2022-01-01 PROCEDURE — C9113 INJ PANTOPRAZOLE SODIUM, VIA: HCPCS | Performed by: INTERNAL MEDICINE

## 2022-01-01 PROCEDURE — 81001 URINALYSIS AUTO W/SCOPE: CPT

## 2022-01-01 PROCEDURE — 2000000000 HC ICU R&B

## 2022-01-01 PROCEDURE — 84100 ASSAY OF PHOSPHORUS: CPT

## 2022-01-01 PROCEDURE — 36415 COLL VENOUS BLD VENIPUNCTURE: CPT

## 2022-01-01 PROCEDURE — 87305 ASPERGILLUS AG IA: CPT

## 2022-01-01 PROCEDURE — 85610 PROTHROMBIN TIME: CPT

## 2022-01-01 PROCEDURE — 84145 PROCALCITONIN (PCT): CPT

## 2022-01-01 PROCEDURE — 2580000003 HC RX 258: Performed by: INTERNAL MEDICINE

## 2022-01-01 PROCEDURE — 36592 COLLECT BLOOD FROM PICC: CPT

## 2022-01-01 PROCEDURE — 6360000002 HC RX W HCPCS: Performed by: SPECIALIST

## 2022-01-01 PROCEDURE — 84478 ASSAY OF TRIGLYCERIDES: CPT

## 2022-01-01 PROCEDURE — 2580000003 HC RX 258: Performed by: SPECIALIST

## 2022-01-01 PROCEDURE — 2700000000 HC OXYGEN THERAPY PER DAY

## 2022-01-01 PROCEDURE — 2580000003 HC RX 258: Performed by: NURSE PRACTITIONER

## 2022-01-01 PROCEDURE — 37799 UNLISTED PX VASCULAR SURGERY: CPT

## 2022-01-01 PROCEDURE — 94664 DEMO&/EVAL PT USE INHALER: CPT

## 2022-01-01 PROCEDURE — 87206 SMEAR FLUORESCENT/ACID STAI: CPT

## 2022-01-01 PROCEDURE — 94002 VENT MGMT INPAT INIT DAY: CPT

## 2022-01-01 PROCEDURE — 51702 INSERT TEMP BLADDER CATH: CPT

## 2022-01-01 PROCEDURE — 87077 CULTURE AEROBIC IDENTIFY: CPT

## 2022-01-01 PROCEDURE — 94003 VENT MGMT INPAT SUBQ DAY: CPT

## 2022-01-01 PROCEDURE — 86481 TB AG RESPONSE T-CELL SUSP: CPT

## 2022-01-01 PROCEDURE — 2060000000 HC ICU INTERMEDIATE R&B

## 2022-01-01 PROCEDURE — 71045 X-RAY EXAM CHEST 1 VIEW: CPT

## 2022-01-01 PROCEDURE — 83735 ASSAY OF MAGNESIUM: CPT

## 2022-01-01 PROCEDURE — 6360000002 HC RX W HCPCS: Performed by: NURSE PRACTITIONER

## 2022-01-01 PROCEDURE — 76937 US GUIDE VASCULAR ACCESS: CPT

## 2022-01-01 PROCEDURE — 85027 COMPLETE CBC AUTOMATED: CPT

## 2022-01-01 PROCEDURE — 2580000003 HC RX 258

## 2022-01-01 PROCEDURE — 83615 LACTATE (LD) (LDH) ENZYME: CPT

## 2022-01-01 PROCEDURE — 82805 BLOOD GASES W/O2 SATURATION: CPT

## 2022-01-01 PROCEDURE — 36600 WITHDRAWAL OF ARTERIAL BLOOD: CPT

## 2022-01-01 PROCEDURE — 6360000002 HC RX W HCPCS

## 2022-01-01 PROCEDURE — 1111F DSCHRG MED/CURRENT MED MERGE: CPT

## 2022-01-01 PROCEDURE — XW0DXM6 INTRODUCTION OF BARICITINIB INTO MOUTH AND PHARYNX, EXTERNAL APPROACH, NEW TECHNOLOGY GROUP 6: ICD-10-PCS | Performed by: INTERNAL MEDICINE

## 2022-01-01 PROCEDURE — 83605 ASSAY OF LACTIC ACID: CPT

## 2022-01-01 PROCEDURE — 94669 MECHANICAL CHEST WALL OSCILL: CPT

## 2022-01-01 PROCEDURE — 82803 BLOOD GASES ANY COMBINATION: CPT

## 2022-01-01 PROCEDURE — 6360000004 HC RX CONTRAST MEDICATION: Performed by: RADIOLOGY

## 2022-01-01 PROCEDURE — 88112 CYTOPATH CELL ENHANCE TECH: CPT

## 2022-01-01 PROCEDURE — 71250 CT THORAX DX C-: CPT

## 2022-01-01 PROCEDURE — 87635 SARS-COV-2 COVID-19 AMP PRB: CPT

## 2022-01-01 PROCEDURE — 87040 BLOOD CULTURE FOR BACTERIA: CPT

## 2022-01-01 PROCEDURE — 74018 RADEX ABDOMEN 1 VIEW: CPT

## 2022-01-01 PROCEDURE — 93010 ELECTROCARDIOGRAM REPORT: CPT | Performed by: INTERNAL MEDICINE

## 2022-01-01 PROCEDURE — 76770 US EXAM ABDO BACK WALL COMP: CPT

## 2022-01-01 PROCEDURE — 3E0333Z INTRODUCTION OF ANTI-INFLAMMATORY INTO PERIPHERAL VEIN, PERCUTANEOUS APPROACH: ICD-10-PCS | Performed by: INTERNAL MEDICINE

## 2022-01-01 PROCEDURE — 85384 FIBRINOGEN ACTIVITY: CPT

## 2022-01-01 PROCEDURE — 0202U NFCT DS 22 TRGT SARS-COV-2: CPT

## 2022-01-01 PROCEDURE — 87081 CULTURE SCREEN ONLY: CPT

## 2022-01-01 PROCEDURE — 2580000003 HC RX 258: Performed by: STUDENT IN AN ORGANIZED HEALTH CARE EDUCATION/TRAINING PROGRAM

## 2022-01-01 PROCEDURE — 84443 ASSAY THYROID STIM HORMONE: CPT

## 2022-01-01 PROCEDURE — 93970 EXTREMITY STUDY: CPT

## 2022-01-01 PROCEDURE — 31500 INSERT EMERGENCY AIRWAY: CPT

## 2022-01-01 PROCEDURE — 6370000000 HC RX 637 (ALT 250 FOR IP): Performed by: STUDENT IN AN ORGANIZED HEALTH CARE EDUCATION/TRAINING PROGRAM

## 2022-01-01 PROCEDURE — 82306 VITAMIN D 25 HYDROXY: CPT

## 2022-01-01 PROCEDURE — 51798 US URINE CAPACITY MEASURE: CPT

## 2022-01-01 PROCEDURE — 2580000003 HC RX 258: Performed by: EMERGENCY MEDICINE

## 2022-01-01 PROCEDURE — 93005 ELECTROCARDIOGRAM TRACING: CPT | Performed by: STUDENT IN AN ORGANIZED HEALTH CARE EDUCATION/TRAINING PROGRAM

## 2022-01-01 PROCEDURE — 82728 ASSAY OF FERRITIN: CPT

## 2022-01-01 PROCEDURE — 6360000002 HC RX W HCPCS: Performed by: STUDENT IN AN ORGANIZED HEALTH CARE EDUCATION/TRAINING PROGRAM

## 2022-01-01 PROCEDURE — 99285 EMERGENCY DEPT VISIT HI MDM: CPT

## 2022-01-01 PROCEDURE — 80048 BASIC METABOLIC PNL TOTAL CA: CPT

## 2022-01-01 PROCEDURE — 85730 THROMBOPLASTIN TIME PARTIAL: CPT

## 2022-01-01 PROCEDURE — 6360000002 HC RX W HCPCS: Performed by: EMERGENCY MEDICINE

## 2022-01-01 PROCEDURE — C1751 CATH, INF, PER/CENT/MIDLINE: HCPCS

## 2022-01-01 PROCEDURE — 2720000010 HC SURG SUPPLY STERILE

## 2022-01-01 PROCEDURE — 80061 LIPID PANEL: CPT

## 2022-01-01 PROCEDURE — 0BH17EZ INSERTION OF ENDOTRACHEAL AIRWAY INTO TRACHEA, VIA NATURAL OR ARTIFICIAL OPENING: ICD-10-PCS | Performed by: INTERNAL MEDICINE

## 2022-01-01 PROCEDURE — 87070 CULTURE OTHR SPECIMN AEROBIC: CPT

## 2022-01-01 PROCEDURE — 93005 ELECTROCARDIOGRAM TRACING: CPT | Performed by: EMERGENCY MEDICINE

## 2022-01-01 PROCEDURE — 36569 INSJ PICC 5 YR+ W/O IMAGING: CPT

## 2022-01-01 PROCEDURE — 71275 CT ANGIOGRAPHY CHEST: CPT

## 2022-01-01 PROCEDURE — 87385 HISTOPLASMA CAPSUL AG IA: CPT

## 2022-01-01 PROCEDURE — 5A1945Z RESPIRATORY VENTILATION, 24-96 CONSECUTIVE HOURS: ICD-10-PCS | Performed by: INTERNAL MEDICINE

## 2022-01-01 PROCEDURE — 93306 TTE W/DOPPLER COMPLETE: CPT

## 2022-01-01 PROCEDURE — 83880 ASSAY OF NATRIURETIC PEPTIDE: CPT

## 2022-01-01 PROCEDURE — 82550 ASSAY OF CK (CPK): CPT

## 2022-01-01 PROCEDURE — 96374 THER/PROPH/DIAG INJ IV PUSH: CPT

## 2022-01-01 PROCEDURE — 87088 URINE BACTERIA CULTURE: CPT

## 2022-01-01 PROCEDURE — 87186 SC STD MICRODIL/AGAR DIL: CPT

## 2022-01-01 RX ORDER — DEXAMETHASONE SODIUM PHOSPHATE 10 MG/ML
6 INJECTION INTRAMUSCULAR; INTRAVENOUS EVERY 12 HOURS
Status: DISCONTINUED | OUTPATIENT
Start: 2022-01-01 | End: 2022-01-01

## 2022-01-01 RX ORDER — SODIUM CHLORIDE 0.9 % (FLUSH) 0.9 %
5-40 SYRINGE (ML) INJECTION EVERY 12 HOURS SCHEDULED
Status: DISCONTINUED | OUTPATIENT
Start: 2022-01-01 | End: 2022-01-01

## 2022-01-01 RX ORDER — ZINC SULFATE 50(220)MG
50 CAPSULE ORAL DAILY
Qty: 30 CAPSULE | Refills: 3 | COMMUNITY
Start: 2022-01-01

## 2022-01-01 RX ORDER — LISINOPRIL 20 MG/1
20 TABLET ORAL DAILY
Status: DISCONTINUED | OUTPATIENT
Start: 2022-01-01 | End: 2022-01-01 | Stop reason: HOSPADM

## 2022-01-01 RX ORDER — ASCORBIC ACID 500 MG
1000 TABLET ORAL DAILY
Status: DISCONTINUED | OUTPATIENT
Start: 2022-01-01 | End: 2022-01-01 | Stop reason: HOSPADM

## 2022-01-01 RX ORDER — FENTANYL CITRATE 0.05 MG/ML
50 INJECTION, SOLUTION INTRAMUSCULAR; INTRAVENOUS ONCE
Status: COMPLETED | OUTPATIENT
Start: 2022-01-01 | End: 2022-01-01

## 2022-01-01 RX ORDER — DEXAMETHASONE SODIUM PHOSPHATE 10 MG/ML
6 INJECTION, SOLUTION INTRAMUSCULAR; INTRAVENOUS EVERY 24 HOURS
Status: DISCONTINUED | OUTPATIENT
Start: 2022-01-01 | End: 2022-01-01

## 2022-01-01 RX ORDER — METHYLPREDNISOLONE SODIUM SUCCINATE 40 MG/ML
40 INJECTION, POWDER, LYOPHILIZED, FOR SOLUTION INTRAMUSCULAR; INTRAVENOUS EVERY 8 HOURS
Status: DISCONTINUED | OUTPATIENT
Start: 2022-01-01 | End: 2022-01-01

## 2022-01-01 RX ORDER — LISINOPRIL AND HYDROCHLOROTHIAZIDE 20; 12.5 MG/1; MG/1
1 TABLET ORAL DAILY
Status: DISCONTINUED | OUTPATIENT
Start: 2022-01-01 | End: 2022-01-01

## 2022-01-01 RX ORDER — METHYLPREDNISOLONE SODIUM SUCCINATE 125 MG/2ML
125 INJECTION, POWDER, LYOPHILIZED, FOR SOLUTION INTRAMUSCULAR; INTRAVENOUS ONCE
Status: COMPLETED | OUTPATIENT
Start: 2022-01-01 | End: 2022-01-01

## 2022-01-01 RX ORDER — BUDESONIDE 0.5 MG/2ML
0.5 INHALANT ORAL 2 TIMES DAILY
Status: DISCONTINUED | OUTPATIENT
Start: 2022-01-01 | End: 2022-01-01

## 2022-01-01 RX ORDER — ZINC SULFATE 50(220)MG
50 CAPSULE ORAL DAILY
Status: DISCONTINUED | OUTPATIENT
Start: 2022-01-01 | End: 2022-01-01

## 2022-01-01 RX ORDER — GAUZE BANDAGE 2" X 2"
100 BANDAGE TOPICAL DAILY
Status: DISCONTINUED | OUTPATIENT
Start: 2022-01-01 | End: 2022-01-01 | Stop reason: HOSPADM

## 2022-01-01 RX ORDER — 0.9 % SODIUM CHLORIDE 0.9 %
500 INTRAVENOUS SOLUTION INTRAVENOUS ONCE
Status: COMPLETED | OUTPATIENT
Start: 2022-01-01 | End: 2022-01-01

## 2022-01-01 RX ORDER — ACETAMINOPHEN 500 MG
500 TABLET ORAL EVERY 6 HOURS PRN
Status: DISCONTINUED | OUTPATIENT
Start: 2022-01-01 | End: 2022-01-01 | Stop reason: HOSPADM

## 2022-01-01 RX ORDER — HALOPERIDOL 5 MG/ML
2 INJECTION INTRAMUSCULAR ONCE
Status: COMPLETED | OUTPATIENT
Start: 2022-01-01 | End: 2022-01-01

## 2022-01-01 RX ORDER — LIDOCAINE HYDROCHLORIDE 10 MG/ML
5 INJECTION, SOLUTION EPIDURAL; INFILTRATION; INTRACAUDAL; PERINEURAL ONCE
Status: COMPLETED | OUTPATIENT
Start: 2022-01-01 | End: 2022-01-01

## 2022-01-01 RX ORDER — VITAMIN E 268 MG
400 CAPSULE ORAL DAILY
Status: DISCONTINUED | OUTPATIENT
Start: 2022-01-01 | End: 2022-01-01

## 2022-01-01 RX ORDER — DEXAMETHASONE 6 MG/1
6 TABLET ORAL
Status: DISCONTINUED | OUTPATIENT
Start: 2022-01-01 | End: 2022-01-01 | Stop reason: SDUPTHER

## 2022-01-01 RX ORDER — GAUZE BANDAGE 2" X 2"
100 BANDAGE TOPICAL DAILY
Status: DISCONTINUED | OUTPATIENT
Start: 2022-01-01 | End: 2022-01-01

## 2022-01-01 RX ORDER — MIDAZOLAM HYDROCHLORIDE 1 MG/ML
1 INJECTION INTRAMUSCULAR; INTRAVENOUS
Status: DISCONTINUED | OUTPATIENT
Start: 2022-01-01 | End: 2022-01-01 | Stop reason: HOSPADM

## 2022-01-01 RX ORDER — PANTOPRAZOLE SODIUM 40 MG/1
40 TABLET, DELAYED RELEASE ORAL
Status: DISCONTINUED | OUTPATIENT
Start: 2022-01-01 | End: 2022-01-01

## 2022-01-01 RX ORDER — GUAIFENESIN/DEXTROMETHORPHAN 100-10MG/5
5 SYRUP ORAL EVERY 4 HOURS PRN
Status: DISCONTINUED | OUTPATIENT
Start: 2022-01-01 | End: 2022-01-01 | Stop reason: HOSPADM

## 2022-01-01 RX ORDER — SODIUM CHLORIDE 9 MG/ML
INJECTION, SOLUTION INTRAVENOUS CONTINUOUS
Status: DISCONTINUED | OUTPATIENT
Start: 2022-01-01 | End: 2022-01-01

## 2022-01-01 RX ORDER — IPRATROPIUM BROMIDE AND ALBUTEROL SULFATE 2.5; .5 MG/3ML; MG/3ML
3 SOLUTION RESPIRATORY (INHALATION) ONCE
Status: COMPLETED | OUTPATIENT
Start: 2022-01-01 | End: 2022-01-01

## 2022-01-01 RX ORDER — ASCORBIC ACID 500 MG
500 TABLET ORAL DAILY
Status: DISCONTINUED | OUTPATIENT
Start: 2022-01-01 | End: 2022-01-01 | Stop reason: SDUPTHER

## 2022-01-01 RX ORDER — LISINOPRIL AND HYDROCHLOROTHIAZIDE 20; 12.5 MG/1; MG/1
1 TABLET ORAL DAILY
Status: DISCONTINUED | OUTPATIENT
Start: 2022-01-01 | End: 2022-01-01 | Stop reason: SDUPTHER

## 2022-01-01 RX ORDER — HEPARIN SODIUM 5000 [USP'U]/ML
5000 INJECTION, SOLUTION INTRAVENOUS; SUBCUTANEOUS EVERY 8 HOURS SCHEDULED
Status: DISCONTINUED | OUTPATIENT
Start: 2022-01-01 | End: 2022-01-01 | Stop reason: HOSPADM

## 2022-01-01 RX ORDER — PROPOFOL 10 MG/ML
5-50 INJECTION, EMULSION INTRAVENOUS CONTINUOUS
Status: DISCONTINUED | OUTPATIENT
Start: 2022-01-01 | End: 2022-01-01 | Stop reason: HOSPADM

## 2022-01-01 RX ORDER — DOXYCYCLINE HYCLATE 100 MG/1
100 CAPSULE ORAL EVERY 12 HOURS SCHEDULED
Status: DISCONTINUED | OUTPATIENT
Start: 2022-01-01 | End: 2022-01-01 | Stop reason: HOSPADM

## 2022-01-01 RX ORDER — ZINC SULFATE 50(220)MG
50 CAPSULE ORAL DAILY
Status: DISCONTINUED | OUTPATIENT
Start: 2022-01-01 | End: 2022-01-01 | Stop reason: HOSPADM

## 2022-01-01 RX ORDER — SODIUM CHLORIDE AND POTASSIUM CHLORIDE .9; .15 G/100ML; G/100ML
SOLUTION INTRAVENOUS CONTINUOUS
Status: DISCONTINUED | OUTPATIENT
Start: 2022-01-01 | End: 2022-01-01

## 2022-01-01 RX ORDER — HALOPERIDOL 5 MG/ML
10 INJECTION INTRAMUSCULAR EVERY 4 HOURS PRN
Status: DISCONTINUED | OUTPATIENT
Start: 2022-01-01 | End: 2022-01-01 | Stop reason: HOSPADM

## 2022-01-01 RX ORDER — SUCCINYLCHOLINE CHLORIDE 20 MG/ML
100 INJECTION INTRAMUSCULAR; INTRAVENOUS ONCE
Status: DISCONTINUED | OUTPATIENT
Start: 2022-01-01 | End: 2022-01-01

## 2022-01-01 RX ORDER — ETOMIDATE 2 MG/ML
20 INJECTION INTRAVENOUS ONCE
Status: DISCONTINUED | OUTPATIENT
Start: 2022-01-01 | End: 2022-01-01 | Stop reason: SDUPTHER

## 2022-01-01 RX ORDER — SUCCINYLCHOLINE/SOD CL,ISO/PF 200MG/10ML
SYRINGE (ML) INTRAVENOUS
Status: DISCONTINUED
Start: 2022-01-01 | End: 2022-01-01

## 2022-01-01 RX ORDER — HYDROCHLOROTHIAZIDE 12.5 MG/1
12.5 TABLET ORAL DAILY
Status: DISCONTINUED | OUTPATIENT
Start: 2022-01-01 | End: 2022-01-01 | Stop reason: HOSPADM

## 2022-01-01 RX ORDER — BUDESONIDE AND FORMOTEROL FUMARATE DIHYDRATE 160; 4.5 UG/1; UG/1
2 AEROSOL RESPIRATORY (INHALATION) 2 TIMES DAILY
Status: DISCONTINUED | OUTPATIENT
Start: 2022-01-01 | End: 2022-01-01

## 2022-01-01 RX ORDER — DEXAMETHASONE SODIUM PHOSPHATE 10 MG/ML
6 INJECTION, SOLUTION INTRAMUSCULAR; INTRAVENOUS EVERY 12 HOURS
Status: DISCONTINUED | OUTPATIENT
Start: 2022-01-01 | End: 2022-01-01

## 2022-01-01 RX ORDER — VITAMIN B COMPLEX
4000 TABLET ORAL DAILY
Status: DISCONTINUED | OUTPATIENT
Start: 2022-01-01 | End: 2022-01-01 | Stop reason: HOSPADM

## 2022-01-01 RX ORDER — SODIUM CHLORIDE 9 MG/ML
INJECTION, SOLUTION INTRAVENOUS PRN
Status: DISCONTINUED | OUTPATIENT
Start: 2022-01-01 | End: 2022-01-01

## 2022-01-01 RX ORDER — ASCORBIC ACID 500 MG
1000 TABLET ORAL DAILY
Status: DISCONTINUED | OUTPATIENT
Start: 2022-01-01 | End: 2022-01-01

## 2022-01-01 RX ORDER — LISINOPRIL AND HYDROCHLOROTHIAZIDE 20; 12.5 MG/1; MG/1
1 TABLET ORAL DAILY
Status: DISCONTINUED | OUTPATIENT
Start: 2022-01-01 | End: 2022-01-01 | Stop reason: CLARIF

## 2022-01-01 RX ORDER — SODIUM CHLORIDE 0.9 % (FLUSH) 0.9 %
5-40 SYRINGE (ML) INJECTION PRN
Status: DISCONTINUED | OUTPATIENT
Start: 2022-01-01 | End: 2022-01-01

## 2022-01-01 RX ORDER — IPRATROPIUM BROMIDE AND ALBUTEROL SULFATE 2.5; .5 MG/3ML; MG/3ML
1 SOLUTION RESPIRATORY (INHALATION) EVERY 4 HOURS
Status: DISCONTINUED | OUTPATIENT
Start: 2022-01-01 | End: 2022-01-01 | Stop reason: CLARIF

## 2022-01-01 RX ORDER — PROCHLORPERAZINE EDISYLATE 5 MG/ML
10 INJECTION INTRAMUSCULAR; INTRAVENOUS EVERY 6 HOURS PRN
Status: DISCONTINUED | OUTPATIENT
Start: 2022-01-01 | End: 2022-01-01

## 2022-01-01 RX ORDER — LANOLIN ALCOHOL/MO/W.PET/CERES
50 CREAM (GRAM) TOPICAL DAILY
Status: DISCONTINUED | OUTPATIENT
Start: 2022-01-01 | End: 2022-01-01 | Stop reason: HOSPADM

## 2022-01-01 RX ORDER — DEXAMETHASONE SODIUM PHOSPHATE 10 MG/ML
6 INJECTION, SOLUTION INTRAMUSCULAR; INTRAVENOUS EVERY 8 HOURS
Status: DISCONTINUED | OUTPATIENT
Start: 2022-01-01 | End: 2022-01-01

## 2022-01-01 RX ORDER — IPRATROPIUM BROMIDE AND ALBUTEROL SULFATE 2.5; .5 MG/3ML; MG/3ML
SOLUTION RESPIRATORY (INHALATION)
COMMUNITY
Start: 2021-01-01

## 2022-01-01 RX ORDER — LANOLIN ALCOHOL/MO/W.PET/CERES
50 CREAM (GRAM) TOPICAL DAILY
Status: DISCONTINUED | OUTPATIENT
Start: 2022-01-01 | End: 2022-01-01

## 2022-01-01 RX ORDER — LISINOPRIL 5 MG/1
5 TABLET ORAL DAILY
Status: DISCONTINUED | OUTPATIENT
Start: 2022-01-01 | End: 2022-01-01

## 2022-01-01 RX ORDER — PANTOPRAZOLE SODIUM 40 MG/1
40 TABLET, DELAYED RELEASE ORAL
Status: DISCONTINUED | OUTPATIENT
Start: 2022-01-01 | End: 2022-01-01 | Stop reason: HOSPADM

## 2022-01-01 RX ORDER — DEXTROSE AND SODIUM CHLORIDE 5; .9 G/100ML; G/100ML
INJECTION, SOLUTION INTRAVENOUS CONTINUOUS
Status: DISCONTINUED | OUTPATIENT
Start: 2022-01-01 | End: 2022-01-01

## 2022-01-01 RX ORDER — ACETAMINOPHEN 500 MG
1000 TABLET ORAL ONCE
Status: COMPLETED | OUTPATIENT
Start: 2022-01-01 | End: 2022-01-01

## 2022-01-01 RX ORDER — BUDESONIDE 0.5 MG/2ML
0.5 INHALANT ORAL 2 TIMES DAILY
Status: DISCONTINUED | OUTPATIENT
Start: 2022-01-01 | End: 2022-01-01 | Stop reason: CLARIF

## 2022-01-01 RX ORDER — ARFORMOTEROL TARTRATE 15 UG/2ML
15 SOLUTION RESPIRATORY (INHALATION) 2 TIMES DAILY
Status: DISCONTINUED | OUTPATIENT
Start: 2022-01-01 | End: 2022-01-01 | Stop reason: HOSPADM

## 2022-01-01 RX ORDER — VITAMIN B COMPLEX
4000 TABLET ORAL DAILY
Status: DISCONTINUED | OUTPATIENT
Start: 2022-01-01 | End: 2022-01-01

## 2022-01-01 RX ORDER — ARFORMOTEROL TARTRATE 15 UG/2ML
15 SOLUTION RESPIRATORY (INHALATION) 2 TIMES DAILY
Status: DISCONTINUED | OUTPATIENT
Start: 2022-01-01 | End: 2022-01-01 | Stop reason: CLARIF

## 2022-01-01 RX ORDER — FLUTICASONE PROPIONATE 50 MCG
2 SPRAY, SUSPENSION (ML) NASAL DAILY
Status: DISCONTINUED | OUTPATIENT
Start: 2022-01-01 | End: 2022-01-01 | Stop reason: HOSPADM

## 2022-01-01 RX ORDER — IPRATROPIUM BROMIDE AND ALBUTEROL SULFATE 2.5; .5 MG/3ML; MG/3ML
1 SOLUTION RESPIRATORY (INHALATION) EVERY 4 HOURS
Status: DISCONTINUED | OUTPATIENT
Start: 2022-01-01 | End: 2022-01-01

## 2022-01-01 RX ORDER — ASPIRIN 81 MG/1
81 TABLET ORAL DAILY
Status: DISCONTINUED | OUTPATIENT
Start: 2022-01-01 | End: 2022-01-01 | Stop reason: HOSPADM

## 2022-01-01 RX ORDER — ETOMIDATE 2 MG/ML
INJECTION INTRAVENOUS
Status: DISPENSED
Start: 2022-01-01 | End: 2022-01-01

## 2022-01-01 RX ORDER — ASCORBIC ACID 500 MG
1000 TABLET ORAL DAILY
Status: DISCONTINUED | OUTPATIENT
Start: 2022-01-01 | End: 2022-01-01 | Stop reason: SDUPTHER

## 2022-01-01 RX ORDER — MIDAZOLAM HYDROCHLORIDE 5 MG/ML
5 INJECTION, SOLUTION INTRAMUSCULAR; INTRAVENOUS ONCE
Status: COMPLETED | OUTPATIENT
Start: 2022-01-01 | End: 2022-01-01

## 2022-01-01 RX ORDER — ALBUTEROL SULFATE 90 UG/1
2 AEROSOL, METERED RESPIRATORY (INHALATION) EVERY 4 HOURS PRN
Status: DISCONTINUED | OUTPATIENT
Start: 2022-01-01 | End: 2022-01-01

## 2022-01-01 RX ORDER — PROPOFOL 10 MG/ML
INJECTION, EMULSION INTRAVENOUS
Status: DISPENSED
Start: 2022-01-01 | End: 2022-01-01

## 2022-01-01 RX ORDER — FLUTICASONE PROPIONATE 50 MCG
2 SPRAY, SUSPENSION (ML) NASAL DAILY
Status: DISCONTINUED | OUTPATIENT
Start: 2022-01-01 | End: 2022-01-01

## 2022-01-01 RX ORDER — SODIUM CHLORIDE 9 MG/ML
33.3 INJECTION, SOLUTION INTRAVENOUS EVERY 12 HOURS
Status: DISCONTINUED | OUTPATIENT
Start: 2022-01-01 | End: 2022-01-01

## 2022-01-01 RX ORDER — ETOMIDATE 2 MG/ML
20 INJECTION INTRAVENOUS ONCE
Status: COMPLETED | OUTPATIENT
Start: 2022-01-01 | End: 2022-01-01

## 2022-01-01 RX ORDER — MIDAZOLAM HYDROCHLORIDE 5 MG/ML
INJECTION INTRAMUSCULAR; INTRAVENOUS
Status: COMPLETED
Start: 2022-01-01 | End: 2022-01-01

## 2022-01-01 RX ORDER — LEVOFLOXACIN 500 MG/1
500 TABLET, FILM COATED ORAL ONCE
Status: DISCONTINUED | OUTPATIENT
Start: 2022-01-01 | End: 2022-01-01 | Stop reason: HOSPADM

## 2022-01-01 RX ORDER — HEPARIN SODIUM (PORCINE) LOCK FLUSH IV SOLN 100 UNIT/ML 100 UNIT/ML
3 SOLUTION INTRAVENOUS PRN
Status: DISCONTINUED | OUTPATIENT
Start: 2022-01-01 | End: 2022-01-01

## 2022-01-01 RX ORDER — DEXMEDETOMIDINE HYDROCHLORIDE 4 UG/ML
.1-1.5 INJECTION, SOLUTION INTRAVENOUS CONTINUOUS
Status: DISCONTINUED | OUTPATIENT
Start: 2022-01-01 | End: 2022-01-01

## 2022-01-01 RX ORDER — BUDESONIDE 0.5 MG/2ML
0.5 INHALANT ORAL 2 TIMES DAILY
Status: DISCONTINUED | OUTPATIENT
Start: 2022-01-01 | End: 2022-01-01 | Stop reason: HOSPADM

## 2022-01-01 RX ORDER — DEXAMETHASONE 6 MG/1
6 TABLET ORAL
Qty: 6 TABLET | Refills: 0 | Status: SHIPPED | OUTPATIENT
Start: 2022-01-01 | End: 2022-01-01

## 2022-01-01 RX ORDER — FUROSEMIDE 10 MG/ML
20 INJECTION INTRAMUSCULAR; INTRAVENOUS ONCE
Status: COMPLETED | OUTPATIENT
Start: 2022-01-01 | End: 2022-01-01

## 2022-01-01 RX ORDER — SODIUM CHLORIDE 9 MG/ML
25 INJECTION, SOLUTION INTRAVENOUS CONTINUOUS
Status: DISCONTINUED | OUTPATIENT
Start: 2022-01-01 | End: 2022-01-01

## 2022-01-01 RX ORDER — IPRATROPIUM BROMIDE AND ALBUTEROL SULFATE 2.5; .5 MG/3ML; MG/3ML
1 SOLUTION RESPIRATORY (INHALATION) EVERY 4 HOURS PRN
Status: DISCONTINUED | OUTPATIENT
Start: 2022-01-01 | End: 2022-01-01 | Stop reason: HOSPADM

## 2022-01-01 RX ORDER — GLYCOPYRROLATE 0.2 MG/ML
0.4 INJECTION INTRAMUSCULAR; INTRAVENOUS
Status: DISCONTINUED | OUTPATIENT
Start: 2022-01-01 | End: 2022-01-01 | Stop reason: HOSPADM

## 2022-01-01 RX ORDER — FENTANYL CITRATE 0.05 MG/ML
50 INJECTION, SOLUTION INTRAMUSCULAR; INTRAVENOUS EVERY 30 MIN PRN
Status: DISCONTINUED | OUTPATIENT
Start: 2022-01-01 | End: 2022-01-01 | Stop reason: HOSPADM

## 2022-01-01 RX ORDER — ALBUMIN (HUMAN) 12.5 G/50ML
25 SOLUTION INTRAVENOUS ONCE
Status: COMPLETED | OUTPATIENT
Start: 2022-01-01 | End: 2022-01-01

## 2022-01-01 RX ORDER — VITAMIN B COMPLEX
1000 TABLET ORAL DAILY
Status: DISCONTINUED | OUTPATIENT
Start: 2022-01-01 | End: 2022-01-01 | Stop reason: SDUPTHER

## 2022-01-01 RX ORDER — PANTOPRAZOLE SODIUM 40 MG/1
40 TABLET, DELAYED RELEASE ORAL
Status: DISCONTINUED | OUTPATIENT
Start: 2022-01-01 | End: 2022-01-01 | Stop reason: SDUPTHER

## 2022-01-01 RX ORDER — BUDESONIDE AND FORMOTEROL FUMARATE DIHYDRATE 160; 4.5 UG/1; UG/1
2 AEROSOL RESPIRATORY (INHALATION) 2 TIMES DAILY
Status: DISCONTINUED | OUTPATIENT
Start: 2022-01-01 | End: 2022-01-01 | Stop reason: CLARIF

## 2022-01-01 RX ORDER — LEVOFLOXACIN 250 MG/1
250 TABLET ORAL DAILY
Qty: 7 TABLET | Refills: 0 | Status: ON HOLD | OUTPATIENT
Start: 2022-01-01 | End: 2022-01-01

## 2022-01-01 RX ORDER — MECOBALAMIN 5000 MCG
5 TABLET,DISINTEGRATING ORAL NIGHTLY
Status: DISCONTINUED | OUTPATIENT
Start: 2022-01-01 | End: 2022-01-01

## 2022-01-01 RX ORDER — MIDAZOLAM HYDROCHLORIDE 1 MG/ML
5 INJECTION INTRAMUSCULAR; INTRAVENOUS ONCE
Status: DISCONTINUED | OUTPATIENT
Start: 2022-01-01 | End: 2022-01-01 | Stop reason: SDUPTHER

## 2022-01-01 RX ORDER — DEXAMETHASONE 6 MG/1
6 TABLET ORAL DAILY
Status: DISCONTINUED | OUTPATIENT
Start: 2022-01-01 | End: 2022-01-01 | Stop reason: HOSPADM

## 2022-01-01 RX ORDER — HEPARIN SODIUM (PORCINE) LOCK FLUSH IV SOLN 100 UNIT/ML 100 UNIT/ML
3 SOLUTION INTRAVENOUS EVERY 12 HOURS SCHEDULED
Status: DISCONTINUED | OUTPATIENT
Start: 2022-01-01 | End: 2022-01-01

## 2022-01-01 RX ORDER — ZINC GLUCONATE 50 MG
50 TABLET ORAL DAILY
Status: DISCONTINUED | OUTPATIENT
Start: 2022-01-01 | End: 2022-01-01

## 2022-01-01 RX ADMIN — IPRATROPIUM BROMIDE AND ALBUTEROL SULFATE 1 AMPULE: .5; 3 SOLUTION RESPIRATORY (INHALATION) at 02:43

## 2022-01-01 RX ADMIN — DEXAMETHASONE SODIUM PHOSPHATE 6 MG: 10 INJECTION, SOLUTION INTRAMUSCULAR; INTRAVENOUS at 13:51

## 2022-01-01 RX ADMIN — DEXAMETHASONE SODIUM PHOSPHATE 6 MG: 10 INJECTION, SOLUTION INTRAMUSCULAR; INTRAVENOUS at 04:57

## 2022-01-01 RX ADMIN — OXYCODONE HYDROCHLORIDE AND ACETAMINOPHEN 1000 MG: 500 TABLET ORAL at 08:37

## 2022-01-01 RX ADMIN — IPRATROPIUM BROMIDE AND ALBUTEROL SULFATE 1 AMPULE: .5; 3 SOLUTION RESPIRATORY (INHALATION) at 22:19

## 2022-01-01 RX ADMIN — PROPOFOL 50 MCG/KG/MIN: 10 INJECTION, EMULSION INTRAVENOUS at 18:23

## 2022-01-01 RX ADMIN — DEXTROSE MONOHYDRATE 100 MG: 50 INJECTION, SOLUTION INTRAVENOUS at 09:44

## 2022-01-01 RX ADMIN — IPRATROPIUM BROMIDE 0.5 MG: 0.5 SOLUTION RESPIRATORY (INHALATION) at 06:27

## 2022-01-01 RX ADMIN — HEPARIN SODIUM 5000 UNITS: 5000 INJECTION INTRAVENOUS; SUBCUTANEOUS at 21:32

## 2022-01-01 RX ADMIN — IPRATROPIUM BROMIDE AND ALBUTEROL SULFATE 1 AMPULE: .5; 3 SOLUTION RESPIRATORY (INHALATION) at 10:18

## 2022-01-01 RX ADMIN — ASPIRIN 81 MG: 81 TABLET, COATED ORAL at 09:39

## 2022-01-01 RX ADMIN — IPRATROPIUM BROMIDE AND ALBUTEROL SULFATE 1 AMPULE: .5; 3 SOLUTION RESPIRATORY (INHALATION) at 01:42

## 2022-01-01 RX ADMIN — WATER 1000 MG: 1 INJECTION INTRAMUSCULAR; INTRAVENOUS; SUBCUTANEOUS at 13:51

## 2022-01-01 RX ADMIN — HYDROCHLOROTHIAZIDE 12.5 MG: 12.5 TABLET ORAL at 10:24

## 2022-01-01 RX ADMIN — IPRATROPIUM BROMIDE AND ALBUTEROL SULFATE 1 AMPULE: .5; 3 SOLUTION RESPIRATORY (INHALATION) at 02:16

## 2022-01-01 RX ADMIN — PROPOFOL 50 MCG/KG/MIN: 10 INJECTION, EMULSION INTRAVENOUS at 09:51

## 2022-01-01 RX ADMIN — SODIUM CHLORIDE, PRESERVATIVE FREE 10 ML: 5 INJECTION INTRAVENOUS at 20:51

## 2022-01-01 RX ADMIN — THIAMINE HCL TAB 100 MG 100 MG: 100 TAB at 08:37

## 2022-01-01 RX ADMIN — BUDESONIDE 500 MCG: 0.5 INHALANT RESPIRATORY (INHALATION) at 06:21

## 2022-01-01 RX ADMIN — HEPARIN SODIUM 5000 UNITS: 5000 INJECTION INTRAVENOUS; SUBCUTANEOUS at 21:46

## 2022-01-01 RX ADMIN — METHYLPREDNISOLONE SODIUM SUCCINATE 40 MG: 40 INJECTION, POWDER, FOR SOLUTION INTRAMUSCULAR; INTRAVENOUS at 03:58

## 2022-01-01 RX ADMIN — DEXAMETHASONE SODIUM PHOSPHATE 6 MG: 10 INJECTION, SOLUTION INTRAMUSCULAR; INTRAVENOUS at 14:49

## 2022-01-01 RX ADMIN — DEXAMETHASONE SODIUM PHOSPHATE 6 MG: 10 INJECTION INTRAMUSCULAR; INTRAVENOUS at 00:50

## 2022-01-01 RX ADMIN — Medication 5 MG: at 20:19

## 2022-01-01 RX ADMIN — Medication 400 UNITS: at 09:43

## 2022-01-01 RX ADMIN — Medication 4000 UNITS: at 18:30

## 2022-01-01 RX ADMIN — MIDAZOLAM HYDROCHLORIDE 5 MG: 5 INJECTION, SOLUTION INTRAMUSCULAR; INTRAVENOUS at 12:21

## 2022-01-01 RX ADMIN — THIAMINE HCL TAB 100 MG 100 MG: 100 TAB at 09:34

## 2022-01-01 RX ADMIN — HEPARIN SODIUM 5000 UNITS: 5000 INJECTION INTRAVENOUS; SUBCUTANEOUS at 14:50

## 2022-01-01 RX ADMIN — THIAMINE HCL TAB 100 MG 100 MG: 100 TAB at 09:10

## 2022-01-01 RX ADMIN — ARFORMOTEROL TARTRATE 15 MCG: 15 SOLUTION RESPIRATORY (INHALATION) at 05:55

## 2022-01-01 RX ADMIN — DEXTROSE MONOHYDRATE 2000 MG: 5 INJECTION INTRAVENOUS at 13:26

## 2022-01-01 RX ADMIN — IPRATROPIUM BROMIDE AND ALBUTEROL SULFATE 1 AMPULE: .5; 3 SOLUTION RESPIRATORY (INHALATION) at 10:17

## 2022-01-01 RX ADMIN — IPRATROPIUM BROMIDE AND ALBUTEROL SULFATE 1 AMPULE: .5; 3 SOLUTION RESPIRATORY (INHALATION) at 22:30

## 2022-01-01 RX ADMIN — THIAMINE HCL TAB 100 MG 100 MG: 100 TAB at 09:43

## 2022-01-01 RX ADMIN — FENTANYL CITRATE 125 MCG/HR: 0.05 INJECTION, SOLUTION INTRAMUSCULAR; INTRAVENOUS at 14:35

## 2022-01-01 RX ADMIN — WATER 1000 MG: 1 INJECTION INTRAMUSCULAR; INTRAVENOUS; SUBCUTANEOUS at 18:25

## 2022-01-01 RX ADMIN — CEFTAZIDIME, AVIBACTAM 2.5 G: 2; .5 POWDER, FOR SOLUTION INTRAVENOUS at 01:47

## 2022-01-01 RX ADMIN — IPRATROPIUM BROMIDE AND ALBUTEROL SULFATE 1 AMPULE: .5; 3 SOLUTION RESPIRATORY (INHALATION) at 15:03

## 2022-01-01 RX ADMIN — Medication 400 UNITS: at 12:12

## 2022-01-01 RX ADMIN — Medication 50 MG: at 09:34

## 2022-01-01 RX ADMIN — SODIUM CHLORIDE 33.3 ML: 9 INJECTION, SOLUTION INTRAVENOUS at 04:39

## 2022-01-01 RX ADMIN — THIAMINE HCL TAB 100 MG 100 MG: 100 TAB at 10:23

## 2022-01-01 RX ADMIN — SODIUM CHLORIDE 25 ML: 9 INJECTION, SOLUTION INTRAVENOUS at 16:38

## 2022-01-01 RX ADMIN — ENOXAPARIN SODIUM 30 MG: 100 INJECTION SUBCUTANEOUS at 08:37

## 2022-01-01 RX ADMIN — MEROPENEM 1000 MG: 1 INJECTION, POWDER, FOR SOLUTION INTRAVENOUS at 12:49

## 2022-01-01 RX ADMIN — PYRIDOXINE HCL TAB 50 MG 50 MG: 50 TAB at 09:10

## 2022-01-01 RX ADMIN — IPRATROPIUM BROMIDE AND ALBUTEROL SULFATE 1 AMPULE: .5; 3 SOLUTION RESPIRATORY (INHALATION) at 04:42

## 2022-01-01 RX ADMIN — ZINC SULFATE 220 MG (50 MG) CAPSULE 50 MG: CAPSULE at 08:37

## 2022-01-01 RX ADMIN — IPRATROPIUM BROMIDE AND ALBUTEROL SULFATE 1 AMPULE: .5; 3 SOLUTION RESPIRATORY (INHALATION) at 10:15

## 2022-01-01 RX ADMIN — IPRATROPIUM BROMIDE 0.5 MG: 0.5 SOLUTION RESPIRATORY (INHALATION) at 14:31

## 2022-01-01 RX ADMIN — IPRATROPIUM BROMIDE AND ALBUTEROL SULFATE 1 AMPULE: .5; 3 SOLUTION RESPIRATORY (INHALATION) at 09:15

## 2022-01-01 RX ADMIN — ENOXAPARIN SODIUM 30 MG: 100 INJECTION SUBCUTANEOUS at 08:52

## 2022-01-01 RX ADMIN — IPRATROPIUM BROMIDE AND ALBUTEROL SULFATE 1 AMPULE: .5; 3 SOLUTION RESPIRATORY (INHALATION) at 10:05

## 2022-01-01 RX ADMIN — IPRATROPIUM BROMIDE AND ALBUTEROL SULFATE 1 AMPULE: .5; 3 SOLUTION RESPIRATORY (INHALATION) at 22:07

## 2022-01-01 RX ADMIN — VANCOMYCIN HYDROCHLORIDE 1000 MG: 1 INJECTION, POWDER, LYOPHILIZED, FOR SOLUTION INTRAVENOUS at 02:59

## 2022-01-01 RX ADMIN — ZINC SULFATE 220 MG (50 MG) CAPSULE 50 MG: CAPSULE at 08:19

## 2022-01-01 RX ADMIN — OXYCODONE HYDROCHLORIDE AND ACETAMINOPHEN 1000 MG: 500 TABLET ORAL at 08:19

## 2022-01-01 RX ADMIN — SODIUM CHLORIDE: 9 INJECTION, SOLUTION INTRAVENOUS at 17:21

## 2022-01-01 RX ADMIN — VANCOMYCIN HYDROCHLORIDE 1000 MG: 1 INJECTION, POWDER, LYOPHILIZED, FOR SOLUTION INTRAVENOUS at 04:14

## 2022-01-01 RX ADMIN — DEXAMETHASONE SODIUM PHOSPHATE 6 MG: 10 INJECTION, SOLUTION INTRAMUSCULAR; INTRAVENOUS at 12:36

## 2022-01-01 RX ADMIN — IPRATROPIUM BROMIDE 0.5 MG: 0.5 SOLUTION RESPIRATORY (INHALATION) at 06:16

## 2022-01-01 RX ADMIN — BUDESONIDE 500 MCG: 0.5 INHALANT RESPIRATORY (INHALATION) at 18:15

## 2022-01-01 RX ADMIN — POTASSIUM CHLORIDE AND SODIUM CHLORIDE: 900; 150 INJECTION, SOLUTION INTRAVENOUS at 14:51

## 2022-01-01 RX ADMIN — BUDESONIDE 500 MCG: 0.5 INHALANT RESPIRATORY (INHALATION) at 19:38

## 2022-01-01 RX ADMIN — Medication 300 UNITS: at 09:17

## 2022-01-01 RX ADMIN — PYRIDOXINE HCL TAB 50 MG 50 MG: 50 TAB at 09:34

## 2022-01-01 RX ADMIN — PYRIDOXINE HCL TAB 50 MG 50 MG: 50 TAB at 08:51

## 2022-01-01 RX ADMIN — Medication 400 UNITS: at 08:30

## 2022-01-01 RX ADMIN — IPRATROPIUM BROMIDE AND ALBUTEROL SULFATE 1 AMPULE: .5; 3 SOLUTION RESPIRATORY (INHALATION) at 17:50

## 2022-01-01 RX ADMIN — IPRATROPIUM BROMIDE AND ALBUTEROL 1 PUFF: 20; 100 SPRAY, METERED RESPIRATORY (INHALATION) at 17:28

## 2022-01-01 RX ADMIN — FENTANYL CITRATE 50 MCG: 50 INJECTION INTRAMUSCULAR; INTRAVENOUS at 20:11

## 2022-01-01 RX ADMIN — DEXTROSE MONOHYDRATE 100 MG: 50 INJECTION, SOLUTION INTRAVENOUS at 09:23

## 2022-01-01 RX ADMIN — Medication 50 MG: at 10:34

## 2022-01-01 RX ADMIN — THIAMINE HCL TAB 100 MG 100 MG: 100 TAB at 08:59

## 2022-01-01 RX ADMIN — DEXTROSE MONOHYDRATE 2000 MG: 5 INJECTION INTRAVENOUS at 03:58

## 2022-01-01 RX ADMIN — BUDESONIDE 500 MCG: 0.5 INHALANT RESPIRATORY (INHALATION) at 05:53

## 2022-01-01 RX ADMIN — MEROPENEM 1000 MG: 1 INJECTION, POWDER, FOR SOLUTION INTRAVENOUS at 13:45

## 2022-01-01 RX ADMIN — MEROPENEM 1000 MG: 1 INJECTION, POWDER, FOR SOLUTION INTRAVENOUS at 13:08

## 2022-01-01 RX ADMIN — BUDESONIDE 500 MCG: 0.5 INHALANT RESPIRATORY (INHALATION) at 04:53

## 2022-01-01 RX ADMIN — MEROPENEM 1000 MG: 1 INJECTION, POWDER, FOR SOLUTION INTRAVENOUS at 19:51

## 2022-01-01 RX ADMIN — ACETAMINOPHEN 1000 MG: 500 TABLET, COATED ORAL at 10:10

## 2022-01-01 RX ADMIN — IPRATROPIUM BROMIDE AND ALBUTEROL SULFATE 1 AMPULE: .5; 3 SOLUTION RESPIRATORY (INHALATION) at 16:06

## 2022-01-01 RX ADMIN — OXYCODONE HYDROCHLORIDE AND ACETAMINOPHEN 1000 MG: 500 TABLET ORAL at 10:35

## 2022-01-01 RX ADMIN — MEROPENEM 1000 MG: 1 INJECTION, POWDER, FOR SOLUTION INTRAVENOUS at 02:03

## 2022-01-01 RX ADMIN — DEXAMETHASONE SODIUM PHOSPHATE 6 MG: 10 INJECTION, SOLUTION INTRAMUSCULAR; INTRAVENOUS at 12:12

## 2022-01-01 RX ADMIN — THIAMINE HCL TAB 100 MG 100 MG: 100 TAB at 09:16

## 2022-01-01 RX ADMIN — BUDESONIDE 500 MCG: 0.5 INHALANT RESPIRATORY (INHALATION) at 17:37

## 2022-01-01 RX ADMIN — IPRATROPIUM BROMIDE AND ALBUTEROL SULFATE 1 AMPULE: .5; 3 SOLUTION RESPIRATORY (INHALATION) at 01:44

## 2022-01-01 RX ADMIN — Medication 5 MG: at 21:17

## 2022-01-01 RX ADMIN — THIAMINE HCL TAB 100 MG 100 MG: 100 TAB at 18:23

## 2022-01-01 RX ADMIN — IPRATROPIUM BROMIDE AND ALBUTEROL SULFATE 1 AMPULE: .5; 3 SOLUTION RESPIRATORY (INHALATION) at 14:00

## 2022-01-01 RX ADMIN — DOXYCYCLINE 100 MG: 100 INJECTION, POWDER, LYOPHILIZED, FOR SOLUTION INTRAVENOUS at 03:17

## 2022-01-01 RX ADMIN — HEPARIN SODIUM 5000 UNITS: 5000 INJECTION INTRAVENOUS; SUBCUTANEOUS at 05:58

## 2022-01-01 RX ADMIN — FENTANYL CITRATE 125 MCG/HR: 0.05 INJECTION, SOLUTION INTRAMUSCULAR; INTRAVENOUS at 04:07

## 2022-01-01 RX ADMIN — DOXYCYCLINE 100 MG: 100 INJECTION, POWDER, LYOPHILIZED, FOR SOLUTION INTRAVENOUS at 02:47

## 2022-01-01 RX ADMIN — OXYCODONE HYDROCHLORIDE AND ACETAMINOPHEN 1000 MG: 500 TABLET ORAL at 08:59

## 2022-01-01 RX ADMIN — PYRIDOXINE HCL TAB 50 MG 50 MG: 50 TAB at 10:25

## 2022-01-01 RX ADMIN — ARFORMOTEROL TARTRATE 15 MCG: 15 SOLUTION RESPIRATORY (INHALATION) at 06:16

## 2022-01-01 RX ADMIN — ENOXAPARIN SODIUM 80 MG: 80 INJECTION SUBCUTANEOUS at 20:57

## 2022-01-01 RX ADMIN — ZINC SULFATE 220 MG (50 MG) CAPSULE 50 MG: CAPSULE at 09:43

## 2022-01-01 RX ADMIN — PYRIDOXINE HCL TAB 50 MG 50 MG: 50 TAB at 09:43

## 2022-01-01 RX ADMIN — PROPOFOL 50 MCG/KG/MIN: 10 INJECTION, EMULSION INTRAVENOUS at 00:48

## 2022-01-01 RX ADMIN — Medication 1000 UNITS: at 12:12

## 2022-01-01 RX ADMIN — PYRIDOXINE HCL TAB 50 MG 50 MG: 50 TAB at 08:59

## 2022-01-01 RX ADMIN — BUDESONIDE 500 MCG: 0.5 INHALANT RESPIRATORY (INHALATION) at 16:59

## 2022-01-01 RX ADMIN — BUDESONIDE 500 MCG: 0.5 INHALANT RESPIRATORY (INHALATION) at 06:53

## 2022-01-01 RX ADMIN — VANCOMYCIN HYDROCHLORIDE 500 MG: 500 INJECTION, POWDER, LYOPHILIZED, FOR SOLUTION INTRAVENOUS at 20:00

## 2022-01-01 RX ADMIN — PROPOFOL 50 MCG/KG/MIN: 10 INJECTION, EMULSION INTRAVENOUS at 13:56

## 2022-01-01 RX ADMIN — POTASSIUM CHLORIDE AND SODIUM CHLORIDE: 900; 150 INJECTION, SOLUTION INTRAVENOUS at 18:48

## 2022-01-01 RX ADMIN — HEPARIN SODIUM 5000 UNITS: 5000 INJECTION INTRAVENOUS; SUBCUTANEOUS at 06:04

## 2022-01-01 RX ADMIN — FLUTICASONE PROPIONATE 2 SPRAY: 50 SPRAY, METERED NASAL at 09:25

## 2022-01-01 RX ADMIN — IPRATROPIUM BROMIDE AND ALBUTEROL SULFATE 1 AMPULE: .5; 3 SOLUTION RESPIRATORY (INHALATION) at 22:13

## 2022-01-01 RX ADMIN — Medication 400 UNITS: at 09:09

## 2022-01-01 RX ADMIN — DEXAMETHASONE SODIUM PHOSPHATE 6 MG: 10 INJECTION INTRAMUSCULAR; INTRAVENOUS at 00:02

## 2022-01-01 RX ADMIN — DEXTROSE MONOHYDRATE 100 MG: 50 INJECTION, SOLUTION INTRAVENOUS at 10:33

## 2022-01-01 RX ADMIN — SODIUM CHLORIDE, PRESERVATIVE FREE 10 ML: 5 INJECTION INTRAVENOUS at 09:19

## 2022-01-01 RX ADMIN — HEPARIN SODIUM 5000 UNITS: 5000 INJECTION INTRAVENOUS; SUBCUTANEOUS at 14:03

## 2022-01-01 RX ADMIN — SODIUM CHLORIDE 25 ML: 9 INJECTION, SOLUTION INTRAVENOUS at 00:47

## 2022-01-01 RX ADMIN — Medication 400 UNITS: at 08:37

## 2022-01-01 RX ADMIN — SODIUM CHLORIDE 25 ML: 9 INJECTION, SOLUTION INTRAVENOUS at 12:36

## 2022-01-01 RX ADMIN — PYRIDOXINE HCL TAB 50 MG 50 MG: 50 TAB at 08:30

## 2022-01-01 RX ADMIN — METHYLPREDNISOLONE SODIUM SUCCINATE 40 MG: 40 INJECTION, POWDER, FOR SOLUTION INTRAMUSCULAR; INTRAVENOUS at 21:17

## 2022-01-01 RX ADMIN — IPRATROPIUM BROMIDE AND ALBUTEROL SULFATE 1 AMPULE: .5; 3 SOLUTION RESPIRATORY (INHALATION) at 14:28

## 2022-01-01 RX ADMIN — PYRIDOXINE HCL TAB 50 MG 50 MG: 50 TAB at 12:12

## 2022-01-01 RX ADMIN — METHYLPREDNISOLONE SODIUM SUCCINATE 40 MG: 40 INJECTION, POWDER, FOR SOLUTION INTRAMUSCULAR; INTRAVENOUS at 13:16

## 2022-01-01 RX ADMIN — THIAMINE HCL TAB 100 MG 100 MG: 100 TAB at 12:12

## 2022-01-01 RX ADMIN — MIDAZOLAM 1 MG: 1 INJECTION INTRAMUSCULAR; INTRAVENOUS at 10:20

## 2022-01-01 RX ADMIN — SODIUM CHLORIDE 500 ML: 9 INJECTION, SOLUTION INTRAVENOUS at 10:12

## 2022-01-01 RX ADMIN — IPRATROPIUM BROMIDE AND ALBUTEROL SULFATE 1 AMPULE: .5; 3 SOLUTION RESPIRATORY (INHALATION) at 21:47

## 2022-01-01 RX ADMIN — POTASSIUM CHLORIDE AND SODIUM CHLORIDE: 900; 150 INJECTION, SOLUTION INTRAVENOUS at 15:52

## 2022-01-01 RX ADMIN — BUDESONIDE 500 MCG: 0.5 INHALANT RESPIRATORY (INHALATION) at 18:27

## 2022-01-01 RX ADMIN — OXYCODONE HYDROCHLORIDE AND ACETAMINOPHEN 1000 MG: 500 TABLET ORAL at 09:43

## 2022-01-01 RX ADMIN — BUDESONIDE 500 MCG: 0.5 INHALANT RESPIRATORY (INHALATION) at 06:52

## 2022-01-01 RX ADMIN — IPRATROPIUM BROMIDE AND ALBUTEROL SULFATE 1 AMPULE: .5; 3 SOLUTION RESPIRATORY (INHALATION) at 16:59

## 2022-01-01 RX ADMIN — ARFORMOTEROL TARTRATE 15 MCG: 15 SOLUTION RESPIRATORY (INHALATION) at 06:27

## 2022-01-01 RX ADMIN — PANTOPRAZOLE SODIUM 40 MG: 40 TABLET, DELAYED RELEASE ORAL at 05:58

## 2022-01-01 RX ADMIN — DEXAMETHASONE SODIUM PHOSPHATE 6 MG: 10 INJECTION, SOLUTION INTRAMUSCULAR; INTRAVENOUS at 19:44

## 2022-01-01 RX ADMIN — Medication 4000 UNITS: at 08:51

## 2022-01-01 RX ADMIN — FENTANYL CITRATE 50 MCG: 50 INJECTION INTRAMUSCULAR; INTRAVENOUS at 22:52

## 2022-01-01 RX ADMIN — FLUTICASONE PROPIONATE 2 SPRAY: 50 SPRAY, METERED NASAL at 09:03

## 2022-01-01 RX ADMIN — THIAMINE HCL TAB 100 MG 100 MG: 100 TAB at 08:19

## 2022-01-01 RX ADMIN — ZINC SULFATE 220 MG (50 MG) CAPSULE 50 MG: CAPSULE at 09:09

## 2022-01-01 RX ADMIN — DOXYCYCLINE HYCLATE 100 MG: 100 CAPSULE ORAL at 21:34

## 2022-01-01 RX ADMIN — DEXAMETHASONE SODIUM PHOSPHATE 6 MG: 10 INJECTION, SOLUTION INTRAMUSCULAR; INTRAVENOUS at 23:35

## 2022-01-01 RX ADMIN — Medication 400 UNITS: at 08:51

## 2022-01-01 RX ADMIN — ARFORMOTEROL TARTRATE 15 MCG: 15 SOLUTION RESPIRATORY (INHALATION) at 18:27

## 2022-01-01 RX ADMIN — DEXTROSE MONOHYDRATE 100 MG: 50 INJECTION, SOLUTION INTRAVENOUS at 09:14

## 2022-01-01 RX ADMIN — OXYCODONE HYDROCHLORIDE AND ACETAMINOPHEN 1000 MG: 500 TABLET ORAL at 09:16

## 2022-01-01 RX ADMIN — ENOXAPARIN SODIUM 30 MG: 100 INJECTION SUBCUTANEOUS at 09:00

## 2022-01-01 RX ADMIN — PROPOFOL 50 MCG/KG/MIN: 10 INJECTION, EMULSION INTRAVENOUS at 04:56

## 2022-01-01 RX ADMIN — PANTOPRAZOLE SODIUM 40 MG: 40 TABLET, DELAYED RELEASE ORAL at 06:23

## 2022-01-01 RX ADMIN — MEROPENEM 1000 MG: 1 INJECTION, POWDER, FOR SOLUTION INTRAVENOUS at 09:36

## 2022-01-01 RX ADMIN — OXYCODONE HYDROCHLORIDE AND ACETAMINOPHEN 1000 MG: 500 TABLET ORAL at 18:24

## 2022-01-01 RX ADMIN — BUDESONIDE 500 MCG: 0.5 INHALANT RESPIRATORY (INHALATION) at 17:50

## 2022-01-01 RX ADMIN — Medication 5 MG: at 21:36

## 2022-01-01 RX ADMIN — LIDOCAINE HYDROCHLORIDE 5 ML: 10 INJECTION, SOLUTION EPIDURAL; INFILTRATION; INTRACAUDAL at 21:37

## 2022-01-01 RX ADMIN — PANTOPRAZOLE SODIUM 40 MG: 40 TABLET, DELAYED RELEASE ORAL at 07:07

## 2022-01-01 RX ADMIN — ASPIRIN 81 MG: 81 TABLET, COATED ORAL at 09:34

## 2022-01-01 RX ADMIN — Medication 0.1 MCG/KG/HR: at 14:34

## 2022-01-01 RX ADMIN — HALOPERIDOL LACTATE 2 MG: 5 INJECTION, SOLUTION INTRAMUSCULAR at 12:23

## 2022-01-01 RX ADMIN — DOXYCYCLINE 100 MG: 100 INJECTION, POWDER, LYOPHILIZED, FOR SOLUTION INTRAVENOUS at 15:11

## 2022-01-01 RX ADMIN — OXYCODONE HYDROCHLORIDE AND ACETAMINOPHEN 1000 MG: 500 TABLET ORAL at 08:51

## 2022-01-01 RX ADMIN — HEPARIN SODIUM 5000 UNITS: 5000 INJECTION INTRAVENOUS; SUBCUTANEOUS at 06:33

## 2022-01-01 RX ADMIN — FLUTICASONE PROPIONATE 2 SPRAY: 50 SPRAY, METERED NASAL at 10:25

## 2022-01-01 RX ADMIN — PROPOFOL 50 MCG/KG/MIN: 10 INJECTION, EMULSION INTRAVENOUS at 06:46

## 2022-01-01 RX ADMIN — THIAMINE HCL TAB 100 MG 100 MG: 100 TAB at 10:35

## 2022-01-01 RX ADMIN — METHYLPREDNISOLONE SODIUM SUCCINATE 40 MG: 40 INJECTION, POWDER, FOR SOLUTION INTRAMUSCULAR; INTRAVENOUS at 20:30

## 2022-01-01 RX ADMIN — MEROPENEM 1000 MG: 1 INJECTION, POWDER, FOR SOLUTION INTRAVENOUS at 00:57

## 2022-01-01 RX ADMIN — DEXAMETHASONE SODIUM PHOSPHATE 6 MG: 10 INJECTION, SOLUTION INTRAMUSCULAR; INTRAVENOUS at 14:56

## 2022-01-01 RX ADMIN — DEXTROSE MONOHYDRATE 2000 MG: 5 INJECTION INTRAVENOUS at 20:28

## 2022-01-01 RX ADMIN — FLUTICASONE PROPIONATE 2 SPRAY: 50 SPRAY, METERED NASAL at 12:11

## 2022-01-01 RX ADMIN — PANTOPRAZOLE SODIUM 40 MG: 40 TABLET, DELAYED RELEASE ORAL at 05:23

## 2022-01-01 RX ADMIN — BARICITINIB 4 MG: 2 TABLET, FILM COATED ORAL at 08:30

## 2022-01-01 RX ADMIN — Medication 4000 UNITS: at 09:34

## 2022-01-01 RX ADMIN — FENTANYL CITRATE 125 MCG/HR: 0.05 INJECTION, SOLUTION INTRAMUSCULAR; INTRAVENOUS at 01:45

## 2022-01-01 RX ADMIN — PROPOFOL 50 MCG/KG/MIN: 10 INJECTION, EMULSION INTRAVENOUS at 01:32

## 2022-01-01 RX ADMIN — Medication 4000 UNITS: at 10:32

## 2022-01-01 RX ADMIN — METHYLPREDNISOLONE SODIUM SUCCINATE 40 MG: 40 INJECTION, POWDER, FOR SOLUTION INTRAMUSCULAR; INTRAVENOUS at 20:19

## 2022-01-01 RX ADMIN — WATER 1000 MG: 1 INJECTION INTRAMUSCULAR; INTRAVENOUS; SUBCUTANEOUS at 14:58

## 2022-01-01 RX ADMIN — IPRATROPIUM BROMIDE AND ALBUTEROL SULFATE 1 AMPULE: .5; 3 SOLUTION RESPIRATORY (INHALATION) at 09:59

## 2022-01-01 RX ADMIN — MEROPENEM 1000 MG: 1 INJECTION, POWDER, FOR SOLUTION INTRAVENOUS at 00:01

## 2022-01-01 RX ADMIN — OXYCODONE HYDROCHLORIDE AND ACETAMINOPHEN 1000 MG: 500 TABLET ORAL at 08:31

## 2022-01-01 RX ADMIN — SODIUM CHLORIDE, PRESERVATIVE FREE 10 ML: 5 INJECTION INTRAVENOUS at 20:29

## 2022-01-01 RX ADMIN — PROPOFOL 50 MCG/KG/MIN: 10 INJECTION, EMULSION INTRAVENOUS at 01:48

## 2022-01-01 RX ADMIN — Medication 4000 UNITS: at 10:29

## 2022-01-01 RX ADMIN — DEXTROSE AND SODIUM CHLORIDE: 5; 900 INJECTION, SOLUTION INTRAVENOUS at 01:26

## 2022-01-01 RX ADMIN — METHYLPREDNISOLONE SODIUM SUCCINATE 125 MG: 125 INJECTION, POWDER, FOR SOLUTION INTRAMUSCULAR; INTRAVENOUS at 10:11

## 2022-01-01 RX ADMIN — METHYLPREDNISOLONE SODIUM SUCCINATE 40 MG: 40 INJECTION, POWDER, FOR SOLUTION INTRAMUSCULAR; INTRAVENOUS at 05:07

## 2022-01-01 RX ADMIN — PROPOFOL 50 MCG/KG/MIN: 10 INJECTION, EMULSION INTRAVENOUS at 23:13

## 2022-01-01 RX ADMIN — BUDESONIDE 500 MCG: 0.5 INHALANT RESPIRATORY (INHALATION) at 05:55

## 2022-01-01 RX ADMIN — IPRATROPIUM BROMIDE AND ALBUTEROL SULFATE 1 AMPULE: .5; 3 SOLUTION RESPIRATORY (INHALATION) at 07:12

## 2022-01-01 RX ADMIN — IPRATROPIUM BROMIDE AND ALBUTEROL SULFATE 1 AMPULE: .5; 3 SOLUTION RESPIRATORY (INHALATION) at 18:15

## 2022-01-01 RX ADMIN — BARICITINIB 2 MG: 2 TABLET, FILM COATED ORAL at 09:34

## 2022-01-01 RX ADMIN — PANTOPRAZOLE SODIUM 40 MG: 40 TABLET, DELAYED RELEASE ORAL at 09:43

## 2022-01-01 RX ADMIN — IPRATROPIUM BROMIDE AND ALBUTEROL SULFATE 1 AMPULE: .5; 3 SOLUTION RESPIRATORY (INHALATION) at 06:56

## 2022-01-01 RX ADMIN — BARICITINIB 2 MG: 2 TABLET, FILM COATED ORAL at 10:24

## 2022-01-01 RX ADMIN — CEFTAZIDIME, AVIBACTAM 2.5 G: 2; .5 POWDER, FOR SOLUTION INTRAVENOUS at 14:28

## 2022-01-01 RX ADMIN — ZINC SULFATE 220 MG (50 MG) CAPSULE 50 MG: CAPSULE at 09:16

## 2022-01-01 RX ADMIN — PANTOPRAZOLE SODIUM 40 MG: 40 TABLET, DELAYED RELEASE ORAL at 06:25

## 2022-01-01 RX ADMIN — SODIUM CHLORIDE: 9 INJECTION, SOLUTION INTRAVENOUS at 06:37

## 2022-01-01 RX ADMIN — DOXYCYCLINE 100 MG: 100 INJECTION, POWDER, LYOPHILIZED, FOR SOLUTION INTRAVENOUS at 14:55

## 2022-01-01 RX ADMIN — MEROPENEM 1000 MG: 1 INJECTION, POWDER, FOR SOLUTION INTRAVENOUS at 11:16

## 2022-01-01 RX ADMIN — SODIUM CHLORIDE: 9 INJECTION, SOLUTION INTRAVENOUS at 20:24

## 2022-01-01 RX ADMIN — BARICITINIB 4 MG: 2 TABLET, FILM COATED ORAL at 13:56

## 2022-01-01 RX ADMIN — BARICITINIB 2 MG: 2 TABLET, FILM COATED ORAL at 18:23

## 2022-01-01 RX ADMIN — HEPARIN SODIUM 5000 UNITS: 5000 INJECTION INTRAVENOUS; SUBCUTANEOUS at 14:51

## 2022-01-01 RX ADMIN — ZINC SULFATE 220 MG (50 MG) CAPSULE 50 MG: CAPSULE at 08:40

## 2022-01-01 RX ADMIN — IPRATROPIUM BROMIDE AND ALBUTEROL SULFATE 1 AMPULE: .5; 3 SOLUTION RESPIRATORY (INHALATION) at 06:32

## 2022-01-01 RX ADMIN — SODIUM CHLORIDE, PRESERVATIVE FREE 10 ML: 5 INJECTION INTRAVENOUS at 09:11

## 2022-01-01 RX ADMIN — IPRATROPIUM BROMIDE AND ALBUTEROL SULFATE 1 AMPULE: .5; 3 SOLUTION RESPIRATORY (INHALATION) at 21:58

## 2022-01-01 RX ADMIN — Medication 300 UNITS: at 20:35

## 2022-01-01 RX ADMIN — METHYLPREDNISOLONE SODIUM SUCCINATE 40 MG: 40 INJECTION, POWDER, FOR SOLUTION INTRAMUSCULAR; INTRAVENOUS at 12:45

## 2022-01-01 RX ADMIN — IPRATROPIUM BROMIDE AND ALBUTEROL SULFATE 1 AMPULE: .5; 3 SOLUTION RESPIRATORY (INHALATION) at 18:16

## 2022-01-01 RX ADMIN — OXYCODONE HYDROCHLORIDE AND ACETAMINOPHEN 500 MG: 500 TABLET ORAL at 12:12

## 2022-01-01 RX ADMIN — BUDESONIDE 500 MCG: 0.5 INHALANT RESPIRATORY (INHALATION) at 06:16

## 2022-01-01 RX ADMIN — VANCOMYCIN HYDROCHLORIDE 1000 MG: 1 INJECTION, POWDER, LYOPHILIZED, FOR SOLUTION INTRAVENOUS at 09:41

## 2022-01-01 RX ADMIN — FENTANYL CITRATE 100 MCG/HR: 0.05 INJECTION, SOLUTION INTRAMUSCULAR; INTRAVENOUS at 01:06

## 2022-01-01 RX ADMIN — BARICITINIB 2 MG: 2 TABLET, FILM COATED ORAL at 09:39

## 2022-01-01 RX ADMIN — ARFORMOTEROL TARTRATE 15 MCG: 15 SOLUTION RESPIRATORY (INHALATION) at 17:18

## 2022-01-01 RX ADMIN — LISINOPRIL 20 MG: 20 TABLET ORAL at 10:22

## 2022-01-01 RX ADMIN — SODIUM CHLORIDE 500 ML: 9 INJECTION, SOLUTION INTRAVENOUS at 01:46

## 2022-01-01 RX ADMIN — Medication 5 MG: at 20:51

## 2022-01-01 RX ADMIN — Medication 400 UNITS: at 08:58

## 2022-01-01 RX ADMIN — IPRATROPIUM BROMIDE AND ALBUTEROL SULFATE 3 AMPULE: .5; 3 SOLUTION RESPIRATORY (INHALATION) at 09:34

## 2022-01-01 RX ADMIN — ZINC SULFATE 220 MG (50 MG) CAPSULE 50 MG: CAPSULE at 08:59

## 2022-01-01 RX ADMIN — PYRIDOXINE HCL TAB 50 MG 50 MG: 50 TAB at 08:19

## 2022-01-01 RX ADMIN — Medication 4000 UNITS: at 08:59

## 2022-01-01 RX ADMIN — Medication 4000 UNITS: at 12:46

## 2022-01-01 RX ADMIN — SODIUM CHLORIDE 500 ML: 9 INJECTION, SOLUTION INTRAVENOUS at 12:45

## 2022-01-01 RX ADMIN — BUDESONIDE 500 MCG: 0.5 INHALANT RESPIRATORY (INHALATION) at 07:12

## 2022-01-01 RX ADMIN — Medication 5 MG: at 23:35

## 2022-01-01 RX ADMIN — IPRATROPIUM BROMIDE 0.5 MG: 0.5 SOLUTION RESPIRATORY (INHALATION) at 14:33

## 2022-01-01 RX ADMIN — PYRIDOXINE HCL TAB 50 MG 50 MG: 50 TAB at 09:40

## 2022-01-01 RX ADMIN — DOXYCYCLINE HYCLATE 100 MG: 100 CAPSULE ORAL at 10:24

## 2022-01-01 RX ADMIN — ENOXAPARIN SODIUM 80 MG: 80 INJECTION SUBCUTANEOUS at 14:17

## 2022-01-01 RX ADMIN — DEXAMETHASONE 6 MG: 6 TABLET ORAL at 10:23

## 2022-01-01 RX ADMIN — GLYCOPYRROLATE 0.4 MG: 0.2 INJECTION INTRAMUSCULAR; INTRAVENOUS at 09:39

## 2022-01-01 RX ADMIN — PYRIDOXINE HCL TAB 50 MG 50 MG: 50 TAB at 10:34

## 2022-01-01 RX ADMIN — METHYLPREDNISOLONE SODIUM SUCCINATE 40 MG: 40 INJECTION, POWDER, FOR SOLUTION INTRAMUSCULAR; INTRAVENOUS at 13:07

## 2022-01-01 RX ADMIN — SODIUM CHLORIDE, PRESERVATIVE FREE 10 ML: 5 INJECTION INTRAVENOUS at 21:17

## 2022-01-01 RX ADMIN — HEPARIN SODIUM 5000 UNITS: 5000 INJECTION INTRAVENOUS; SUBCUTANEOUS at 06:23

## 2022-01-01 RX ADMIN — IPRATROPIUM BROMIDE AND ALBUTEROL SULFATE 1 AMPULE: .5; 3 SOLUTION RESPIRATORY (INHALATION) at 13:34

## 2022-01-01 RX ADMIN — Medication 400 UNITS: at 08:19

## 2022-01-01 RX ADMIN — Medication 5 MG: at 20:57

## 2022-01-01 RX ADMIN — MEROPENEM 1000 MG: 1 INJECTION, POWDER, FOR SOLUTION INTRAVENOUS at 00:49

## 2022-01-01 RX ADMIN — DOXYCYCLINE 100 MG: 100 INJECTION, POWDER, LYOPHILIZED, FOR SOLUTION INTRAVENOUS at 13:54

## 2022-01-01 RX ADMIN — DEXTROSE MONOHYDRATE 100 MG: 50 INJECTION, SOLUTION INTRAVENOUS at 08:50

## 2022-01-01 RX ADMIN — DEXAMETHASONE SODIUM PHOSPHATE 6 MG: 10 INJECTION, SOLUTION INTRAMUSCULAR; INTRAVENOUS at 05:09

## 2022-01-01 RX ADMIN — VANCOMYCIN HYDROCHLORIDE 1000 MG: 1 INJECTION, POWDER, LYOPHILIZED, FOR SOLUTION INTRAVENOUS at 14:35

## 2022-01-01 RX ADMIN — FENTANYL CITRATE 50 MCG: 50 INJECTION INTRAMUSCULAR; INTRAVENOUS at 12:24

## 2022-01-01 RX ADMIN — OXYCODONE HYDROCHLORIDE AND ACETAMINOPHEN 1000 MG: 500 TABLET ORAL at 09:40

## 2022-01-01 RX ADMIN — DEXAMETHASONE SODIUM PHOSPHATE 6 MG: 10 INJECTION INTRAMUSCULAR; INTRAVENOUS at 12:38

## 2022-01-01 RX ADMIN — BUDESONIDE 500 MCG: 0.5 INHALANT RESPIRATORY (INHALATION) at 06:33

## 2022-01-01 RX ADMIN — IPRATROPIUM BROMIDE AND ALBUTEROL SULFATE 1 AMPULE: .5; 3 SOLUTION RESPIRATORY (INHALATION) at 05:55

## 2022-01-01 RX ADMIN — IPRATROPIUM BROMIDE AND ALBUTEROL SULFATE 1 AMPULE: .5; 3 SOLUTION RESPIRATORY (INHALATION) at 17:37

## 2022-01-01 RX ADMIN — IPRATROPIUM BROMIDE 0.5 MG: 0.5 SOLUTION RESPIRATORY (INHALATION) at 17:18

## 2022-01-01 RX ADMIN — SODIUM CHLORIDE 40 MG: 9 INJECTION, SOLUTION INTRAMUSCULAR; INTRAVENOUS; SUBCUTANEOUS at 09:10

## 2022-01-01 RX ADMIN — PROPOFOL 5 MCG/KG/MIN: 10 INJECTION, EMULSION INTRAVENOUS at 13:30

## 2022-01-01 RX ADMIN — ZINC SULFATE 220 MG (50 MG) CAPSULE 50 MG: CAPSULE at 08:51

## 2022-01-01 RX ADMIN — DEXTROSE MONOHYDRATE 200 MG: 50 INJECTION, SOLUTION INTRAVENOUS at 09:38

## 2022-01-01 RX ADMIN — BUDESONIDE 500 MCG: 0.5 INHALANT RESPIRATORY (INHALATION) at 04:42

## 2022-01-01 RX ADMIN — MEROPENEM 1000 MG: 1 INJECTION, POWDER, FOR SOLUTION INTRAVENOUS at 12:44

## 2022-01-01 RX ADMIN — IOPAMIDOL 80 ML: 755 INJECTION, SOLUTION INTRAVENOUS at 03:47

## 2022-01-01 RX ADMIN — DEXTROSE MONOHYDRATE 100 MG: 50 INJECTION, SOLUTION INTRAVENOUS at 10:13

## 2022-01-01 RX ADMIN — ASPIRIN 81 MG: 81 TABLET, COATED ORAL at 10:24

## 2022-01-01 RX ADMIN — VANCOMYCIN HYDROCHLORIDE 1000 MG: 1 INJECTION, POWDER, LYOPHILIZED, FOR SOLUTION INTRAVENOUS at 12:21

## 2022-01-01 RX ADMIN — DEXAMETHASONE SODIUM PHOSPHATE 6 MG: 10 INJECTION, SOLUTION INTRAMUSCULAR; INTRAVENOUS at 05:33

## 2022-01-01 RX ADMIN — ENOXAPARIN SODIUM 30 MG: 100 INJECTION SUBCUTANEOUS at 09:43

## 2022-01-01 RX ADMIN — SODIUM CHLORIDE 500 ML: 9 INJECTION, SOLUTION INTRAVENOUS at 06:55

## 2022-01-01 RX ADMIN — Medication 300 UNITS: at 08:32

## 2022-01-01 RX ADMIN — IPRATROPIUM BROMIDE AND ALBUTEROL SULFATE 1 AMPULE: .5; 3 SOLUTION RESPIRATORY (INHALATION) at 01:30

## 2022-01-01 RX ADMIN — IPRATROPIUM BROMIDE AND ALBUTEROL SULFATE 1 AMPULE: .5; 3 SOLUTION RESPIRATORY (INHALATION) at 06:52

## 2022-01-01 RX ADMIN — PANTOPRAZOLE SODIUM 40 MG: 40 TABLET, DELAYED RELEASE ORAL at 06:05

## 2022-01-01 RX ADMIN — SODIUM CHLORIDE 500 ML: 9 INJECTION, SOLUTION INTRAVENOUS at 22:39

## 2022-01-01 RX ADMIN — FENTANYL CITRATE 100 MCG/HR: 0.05 INJECTION, SOLUTION INTRAMUSCULAR; INTRAVENOUS at 09:01

## 2022-01-01 RX ADMIN — HYDROCHLOROTHIAZIDE 12.5 MG: 12.5 TABLET ORAL at 09:40

## 2022-01-01 RX ADMIN — IPRATROPIUM BROMIDE 0.5 MG: 0.5 SOLUTION RESPIRATORY (INHALATION) at 15:37

## 2022-01-01 RX ADMIN — SODIUM CHLORIDE 40 MG: 9 INJECTION, SOLUTION INTRAMUSCULAR; INTRAVENOUS; SUBCUTANEOUS at 08:33

## 2022-01-01 RX ADMIN — FUROSEMIDE 20 MG: 10 INJECTION, SOLUTION INTRAMUSCULAR; INTRAVENOUS at 13:29

## 2022-01-01 RX ADMIN — ALBUMIN (HUMAN) 25 G: 0.25 INJECTION, SOLUTION INTRAVENOUS at 13:31

## 2022-01-01 RX ADMIN — FENTANYL CITRATE 25 MCG/HR: 0.05 INJECTION, SOLUTION INTRAMUSCULAR; INTRAVENOUS at 15:19

## 2022-01-01 RX ADMIN — Medication 4000 UNITS: at 09:40

## 2022-01-01 RX ADMIN — DEXAMETHASONE SODIUM PHOSPHATE 6 MG: 10 INJECTION, SOLUTION INTRAMUSCULAR; INTRAVENOUS at 14:19

## 2022-01-01 RX ADMIN — POTASSIUM CHLORIDE AND SODIUM CHLORIDE: 900; 150 INJECTION, SOLUTION INTRAVENOUS at 23:35

## 2022-01-01 RX ADMIN — Medication 5 MG: at 19:44

## 2022-01-01 RX ADMIN — THIAMINE HCL TAB 100 MG 100 MG: 100 TAB at 08:31

## 2022-01-01 RX ADMIN — DEXTROSE AND SODIUM CHLORIDE: 5; 900 INJECTION, SOLUTION INTRAVENOUS at 18:55

## 2022-01-01 RX ADMIN — IPRATROPIUM BROMIDE AND ALBUTEROL SULFATE 1 AMPULE: .5; 3 SOLUTION RESPIRATORY (INHALATION) at 01:39

## 2022-01-01 RX ADMIN — Medication 4000 UNITS: at 08:37

## 2022-01-01 RX ADMIN — LISINOPRIL 20 MG: 20 TABLET ORAL at 09:40

## 2022-01-01 RX ADMIN — IPRATROPIUM BROMIDE AND ALBUTEROL SULFATE 1 AMPULE: .5; 3 SOLUTION RESPIRATORY (INHALATION) at 17:26

## 2022-01-01 RX ADMIN — SODIUM CHLORIDE 500 ML: 9 INJECTION, SOLUTION INTRAVENOUS at 12:26

## 2022-01-01 RX ADMIN — IPRATROPIUM BROMIDE 0.5 MG: 0.5 SOLUTION RESPIRATORY (INHALATION) at 06:53

## 2022-01-01 RX ADMIN — HEPARIN SODIUM 5000 UNITS: 5000 INJECTION INTRAVENOUS; SUBCUTANEOUS at 21:07

## 2022-01-01 RX ADMIN — Medication 50 MG: at 09:40

## 2022-01-01 RX ADMIN — IPRATROPIUM BROMIDE AND ALBUTEROL SULFATE 1 AMPULE: .5; 3 SOLUTION RESPIRATORY (INHALATION) at 05:53

## 2022-01-01 RX ADMIN — BUDESONIDE 500 MCG: 0.5 INHALANT RESPIRATORY (INHALATION) at 17:18

## 2022-01-01 RX ADMIN — ASPIRIN 81 MG: 81 TABLET, COATED ORAL at 10:33

## 2022-01-01 RX ADMIN — SODIUM CHLORIDE: 9 INJECTION, SOLUTION INTRAVENOUS at 06:57

## 2022-01-01 RX ADMIN — BUDESONIDE 500 MCG: 0.5 INHALANT RESPIRATORY (INHALATION) at 17:26

## 2022-01-01 RX ADMIN — OXYCODONE HYDROCHLORIDE AND ACETAMINOPHEN 1000 MG: 500 TABLET ORAL at 12:45

## 2022-01-01 RX ADMIN — SODIUM CHLORIDE, PRESERVATIVE FREE 10 ML: 5 INJECTION INTRAVENOUS at 08:34

## 2022-01-01 RX ADMIN — MEROPENEM 1000 MG: 1 INJECTION, POWDER, FOR SOLUTION INTRAVENOUS at 12:14

## 2022-01-01 RX ADMIN — THIAMINE HCL TAB 100 MG 100 MG: 100 TAB at 08:51

## 2022-01-01 RX ADMIN — IPRATROPIUM BROMIDE AND ALBUTEROL SULFATE 1 AMPULE: .5; 3 SOLUTION RESPIRATORY (INHALATION) at 09:57

## 2022-01-01 RX ADMIN — SODIUM CHLORIDE 40 MG: 9 INJECTION, SOLUTION INTRAMUSCULAR; INTRAVENOUS; SUBCUTANEOUS at 09:16

## 2022-01-01 RX ADMIN — IPRATROPIUM BROMIDE AND ALBUTEROL SULFATE 1 AMPULE: .5; 3 SOLUTION RESPIRATORY (INHALATION) at 09:48

## 2022-01-01 RX ADMIN — IPRATROPIUM BROMIDE AND ALBUTEROL SULFATE 1 AMPULE: .5; 3 SOLUTION RESPIRATORY (INHALATION) at 01:23

## 2022-01-01 RX ADMIN — PROPOFOL 50 MCG/KG/MIN: 10 INJECTION, EMULSION INTRAVENOUS at 17:20

## 2022-01-01 RX ADMIN — HEPARIN SODIUM 5000 UNITS: 5000 INJECTION INTRAVENOUS; SUBCUTANEOUS at 18:22

## 2022-01-01 RX ADMIN — IPRATROPIUM BROMIDE AND ALBUTEROL SULFATE 1 AMPULE: .5; 3 SOLUTION RESPIRATORY (INHALATION) at 14:21

## 2022-01-01 RX ADMIN — MIDAZOLAM 1 MG: 1 INJECTION INTRAMUSCULAR; INTRAVENOUS at 09:40

## 2022-01-01 RX ADMIN — POTASSIUM CHLORIDE AND SODIUM CHLORIDE: 900; 150 INJECTION, SOLUTION INTRAVENOUS at 05:23

## 2022-01-01 RX ADMIN — PROPOFOL 50 MCG/KG/MIN: 10 INJECTION, EMULSION INTRAVENOUS at 08:35

## 2022-01-01 RX ADMIN — MEROPENEM 1000 MG: 1 INJECTION, POWDER, FOR SOLUTION INTRAVENOUS at 23:17

## 2022-01-01 RX ADMIN — VANCOMYCIN HYDROCHLORIDE 1000 MG: 1 INJECTION, POWDER, LYOPHILIZED, FOR SOLUTION INTRAVENOUS at 06:19

## 2022-01-01 RX ADMIN — Medication 50 MG: at 18:23

## 2022-01-01 RX ADMIN — BUDESONIDE 500 MCG: 0.5 INHALANT RESPIRATORY (INHALATION) at 16:06

## 2022-01-01 RX ADMIN — SODIUM CHLORIDE 40 MG: 9 INJECTION, SOLUTION INTRAMUSCULAR; INTRAVENOUS; SUBCUTANEOUS at 20:26

## 2022-01-01 RX ADMIN — ENOXAPARIN SODIUM 30 MG: 100 INJECTION SUBCUTANEOUS at 17:29

## 2022-01-01 RX ADMIN — BARICITINIB 2 MG: 2 TABLET, FILM COATED ORAL at 10:33

## 2022-01-01 RX ADMIN — IPRATROPIUM BROMIDE 0.5 MG: 0.5 SOLUTION RESPIRATORY (INHALATION) at 18:27

## 2022-01-01 RX ADMIN — SODIUM CHLORIDE: 9 INJECTION, SOLUTION INTRAVENOUS at 21:15

## 2022-01-01 RX ADMIN — VANCOMYCIN HYDROCHLORIDE 1000 MG: 1 INJECTION, POWDER, LYOPHILIZED, FOR SOLUTION INTRAVENOUS at 20:39

## 2022-01-01 RX ADMIN — PROPOFOL 50 MCG/KG/MIN: 10 INJECTION, EMULSION INTRAVENOUS at 19:45

## 2022-01-01 RX ADMIN — MEROPENEM 1000 MG: 1 INJECTION, POWDER, FOR SOLUTION INTRAVENOUS at 23:41

## 2022-01-01 RX ADMIN — BUDESONIDE 500 MCG: 0.5 INHALANT RESPIRATORY (INHALATION) at 18:16

## 2022-01-01 RX ADMIN — OXYCODONE HYDROCHLORIDE AND ACETAMINOPHEN 1000 MG: 500 TABLET ORAL at 10:22

## 2022-01-01 RX ADMIN — PYRIDOXINE HCL TAB 50 MG 50 MG: 50 TAB at 09:16

## 2022-01-01 RX ADMIN — IPRATROPIUM BROMIDE AND ALBUTEROL SULFATE 1 AMPULE: .5; 3 SOLUTION RESPIRATORY (INHALATION) at 14:56

## 2022-01-01 RX ADMIN — IPRATROPIUM BROMIDE 0.5 MG: 0.5 SOLUTION RESPIRATORY (INHALATION) at 09:35

## 2022-01-01 RX ADMIN — ARFORMOTEROL TARTRATE 15 MCG: 15 SOLUTION RESPIRATORY (INHALATION) at 06:53

## 2022-01-01 RX ADMIN — PANTOPRAZOLE SODIUM 40 MG: 40 TABLET, DELAYED RELEASE ORAL at 05:08

## 2022-01-01 RX ADMIN — MEROPENEM 1000 MG: 1 INJECTION, POWDER, FOR SOLUTION INTRAVENOUS at 11:27

## 2022-01-01 RX ADMIN — IPRATROPIUM BROMIDE AND ALBUTEROL SULFATE 1 AMPULE: .5; 3 SOLUTION RESPIRATORY (INHALATION) at 06:21

## 2022-01-01 RX ADMIN — DOXYCYCLINE 100 MG: 100 INJECTION, POWDER, LYOPHILIZED, FOR SOLUTION INTRAVENOUS at 02:17

## 2022-01-01 RX ADMIN — METHYLPREDNISOLONE SODIUM SUCCINATE 40 MG: 40 INJECTION, POWDER, FOR SOLUTION INTRAMUSCULAR; INTRAVENOUS at 03:56

## 2022-01-01 RX ADMIN — IPRATROPIUM BROMIDE AND ALBUTEROL SULFATE 1 AMPULE: .5; 3 SOLUTION RESPIRATORY (INHALATION) at 04:53

## 2022-01-01 RX ADMIN — DEXAMETHASONE SODIUM PHOSPHATE 6 MG: 10 INJECTION INTRAMUSCULAR; INTRAVENOUS at 00:38

## 2022-01-01 RX ADMIN — IPRATROPIUM BROMIDE AND ALBUTEROL SULFATE 1 AMPULE: .5; 3 SOLUTION RESPIRATORY (INHALATION) at 10:09

## 2022-01-01 RX ADMIN — PYRIDOXINE HCL TAB 50 MG 50 MG: 50 TAB at 18:23

## 2022-01-01 RX ADMIN — ENOXAPARIN SODIUM 40 MG: 100 INJECTION SUBCUTANEOUS at 09:16

## 2022-01-01 RX ADMIN — MEROPENEM 1000 MG: 1 INJECTION, POWDER, FOR SOLUTION INTRAVENOUS at 23:39

## 2022-01-01 RX ADMIN — FENTANYL CITRATE 100 MCG/HR: 0.05 INJECTION, SOLUTION INTRAMUSCULAR; INTRAVENOUS at 15:13

## 2022-01-01 RX ADMIN — PROPOFOL 50 MCG/KG/MIN: 10 INJECTION, EMULSION INTRAVENOUS at 17:54

## 2022-01-01 RX ADMIN — DEXAMETHASONE SODIUM PHOSPHATE 6 MG: 10 INJECTION, SOLUTION INTRAMUSCULAR; INTRAVENOUS at 12:10

## 2022-01-01 RX ADMIN — IPRATROPIUM BROMIDE 0.5 MG: 0.5 SOLUTION RESPIRATORY (INHALATION) at 13:28

## 2022-01-01 RX ADMIN — DEXAMETHASONE SODIUM PHOSPHATE 6 MG: 10 INJECTION INTRAMUSCULAR; INTRAVENOUS at 13:30

## 2022-01-01 RX ADMIN — IPRATROPIUM BROMIDE 0.5 MG: 0.5 SOLUTION RESPIRATORY (INHALATION) at 19:38

## 2022-01-01 RX ADMIN — HALOPERIDOL LACTATE 10 MG: 5 INJECTION, SOLUTION INTRAMUSCULAR at 09:39

## 2022-01-01 RX ADMIN — IPRATROPIUM BROMIDE AND ALBUTEROL SULFATE 1 AMPULE: .5; 3 SOLUTION RESPIRATORY (INHALATION) at 14:15

## 2022-01-01 RX ADMIN — OXYCODONE HYDROCHLORIDE AND ACETAMINOPHEN 1000 MG: 500 TABLET ORAL at 09:09

## 2022-01-01 RX ADMIN — WATER 1000 MG: 1 INJECTION INTRAMUSCULAR; INTRAVENOUS; SUBCUTANEOUS at 14:49

## 2022-01-01 RX ADMIN — HEPARIN SODIUM 5000 UNITS: 5000 INJECTION INTRAVENOUS; SUBCUTANEOUS at 21:47

## 2022-01-01 RX ADMIN — ARFORMOTEROL TARTRATE 15 MCG: 15 SOLUTION RESPIRATORY (INHALATION) at 19:38

## 2022-01-01 RX ADMIN — THIAMINE HCL TAB 100 MG 100 MG: 100 TAB at 09:40

## 2022-01-01 RX ADMIN — POTASSIUM CHLORIDE AND SODIUM CHLORIDE: 900; 150 INJECTION, SOLUTION INTRAVENOUS at 07:12

## 2022-01-01 RX ADMIN — ASPIRIN 81 MG: 81 TABLET, COATED ORAL at 18:24

## 2022-01-01 RX ADMIN — DEXAMETHASONE SODIUM PHOSPHATE 6 MG: 10 INJECTION, SOLUTION INTRAMUSCULAR; INTRAVENOUS at 18:25

## 2022-01-01 RX ADMIN — ETOMIDATE 20 MG: 20 INJECTION, SOLUTION INTRAVENOUS at 12:20

## 2022-01-01 RX ADMIN — VANCOMYCIN HYDROCHLORIDE 1000 MG: 1 INJECTION, POWDER, LYOPHILIZED, FOR SOLUTION INTRAVENOUS at 16:35

## 2022-01-01 RX ADMIN — OXYCODONE HYDROCHLORIDE AND ACETAMINOPHEN 1000 MG: 500 TABLET ORAL at 09:35

## 2022-01-01 RX ADMIN — PYRIDOXINE HCL TAB 50 MG 50 MG: 50 TAB at 08:37

## 2022-01-01 RX ADMIN — BARICITINIB 4 MG: 2 TABLET, FILM COATED ORAL at 09:10

## 2022-01-01 RX ADMIN — CEFTAZIDIME, AVIBACTAM 2.5 G: 2; .5 POWDER, FOR SOLUTION INTRAVENOUS at 18:43

## 2022-01-01 RX ADMIN — DOXYCYCLINE 100 MG: 100 INJECTION, POWDER, LYOPHILIZED, FOR SOLUTION INTRAVENOUS at 18:26

## 2022-01-01 RX ADMIN — DEXAMETHASONE SODIUM PHOSPHATE 6 MG: 10 INJECTION, SOLUTION INTRAMUSCULAR; INTRAVENOUS at 20:04

## 2022-01-01 RX ADMIN — Medication 50 MG: at 10:22

## 2022-01-01 RX ADMIN — PANTOPRAZOLE SODIUM 40 MG: 40 TABLET, DELAYED RELEASE ORAL at 06:33

## 2022-01-01 RX ADMIN — BUDESONIDE 500 MCG: 0.5 INHALANT RESPIRATORY (INHALATION) at 06:57

## 2022-01-01 RX ADMIN — ZINC SULFATE 220 MG (50 MG) CAPSULE 50 MG: CAPSULE at 12:46

## 2022-01-01 RX ADMIN — BUDESONIDE AND FORMOTEROL FUMARATE DIHYDRATE 2 PUFF: 160; 4.5 AEROSOL RESPIRATORY (INHALATION) at 17:28

## 2022-01-01 RX ADMIN — BUDESONIDE 500 MCG: 0.5 INHALANT RESPIRATORY (INHALATION) at 06:27

## 2022-01-01 RX ADMIN — DEXTROSE MONOHYDRATE 100 MG: 50 INJECTION, SOLUTION INTRAVENOUS at 09:47

## 2022-01-01 ASSESSMENT — ENCOUNTER SYMPTOMS
APNEA: 0
WHEEZING: 0
ABDOMINAL DISTENTION: 0
EYE PAIN: 0
NAUSEA: 0
EYE DISCHARGE: 0
TROUBLE SWALLOWING: 0
SHORTNESS OF BREATH: 0
WHEEZING: 0
RHINORRHEA: 0
ABDOMINAL PAIN: 0
CHOKING: 0
BACK PAIN: 0
COLOR CHANGE: 0
VOMITING: 0
EYE REDNESS: 0
CHEST TIGHTNESS: 0
SINUS PRESSURE: 0
TACHYPNEA: 1
VOMITING: 0
SHORTNESS OF BREATH: 1
NAUSEA: 0
COUGH: 1
DIARRHEA: 0
COUGH: 0
VOICE CHANGE: 0
SORE THROAT: 0

## 2022-01-01 ASSESSMENT — PULMONARY FUNCTION TESTS
PIF_VALUE: 20
PIF_VALUE: 21
PIF_VALUE: 19
PIF_VALUE: 25
PIF_VALUE: 18
PIF_VALUE: 18
PIF_VALUE: 22
PIF_VALUE: 16
PIF_VALUE: 20
PIF_VALUE: 14
PIF_VALUE: 19
PIF_VALUE: 19
PIF_VALUE: 17
PIF_VALUE: 24
PIF_VALUE: 16
PIF_VALUE: 16
PIF_VALUE: 23
PIF_VALUE: 30
PIF_VALUE: 24
PIF_VALUE: 14
PIF_VALUE: 18
PIF_VALUE: 18
PIF_VALUE: 15
PIF_VALUE: 16
PIF_VALUE: 20
PIF_VALUE: 23
PIF_VALUE: 19
PIF_VALUE: 23
PIF_VALUE: 16
PIF_VALUE: 20
PIF_VALUE: 23
PIF_VALUE: 32
PIF_VALUE: 15
PIF_VALUE: 18
PIF_VALUE: 23
PIF_VALUE: 15
PIF_VALUE: 24
PIF_VALUE: 23
PIF_VALUE: 14
PIF_VALUE: 27
PIF_VALUE: 24
PIF_VALUE: 20
PIF_VALUE: 14
PIF_VALUE: 18
PIF_VALUE: 18
PIF_VALUE: 23
PIF_VALUE: 20
PIF_VALUE: 10
PIF_VALUE: 22
PIF_VALUE: 20
PIF_VALUE: 17
PIF_VALUE: 27
PIF_VALUE: 23
PIF_VALUE: 14
PIF_VALUE: 21
PIF_VALUE: 19
PIF_VALUE: 23
PIF_VALUE: 16
PIF_VALUE: 15
PIF_VALUE: 23
PIF_VALUE: 19
PIF_VALUE: 26
PIF_VALUE: 15
PIF_VALUE: 23
PIF_VALUE: 16
PIF_VALUE: 19
PIF_VALUE: 22
PIF_VALUE: 1015
PIF_VALUE: 25
PIF_VALUE: 13
PIF_VALUE: 19
PIF_VALUE: 11
PIF_VALUE: 25
PIF_VALUE: 18
PIF_VALUE: 15
PIF_VALUE: 30
PIF_VALUE: 26
PIF_VALUE: 14
PIF_VALUE: 19
PIF_VALUE: 29
PIF_VALUE: 14
PIF_VALUE: 24
PIF_VALUE: 26
PIF_VALUE: 12
PIF_VALUE: 19
PIF_VALUE: 23
PIF_VALUE: 19
PIF_VALUE: 13
PIF_VALUE: 22
PIF_VALUE: 26
PIF_VALUE: 20
PIF_VALUE: 23
PIF_VALUE: 16
PIF_VALUE: 18
PIF_VALUE: 23
PIF_VALUE: 13
PIF_VALUE: 14

## 2022-01-01 ASSESSMENT — PAIN SCALES - GENERAL
PAINLEVEL_OUTOF10: 0
PAINLEVEL_OUTOF10: 1
PAINLEVEL_OUTOF10: 0
PAINLEVEL_OUTOF10: 1
PAINLEVEL_OUTOF10: 0
PAINLEVEL_OUTOF10: 1
PAINLEVEL_OUTOF10: 0

## 2022-03-23 PROBLEM — J44.1 COPD EXACERBATION (HCC): Status: ACTIVE | Noted: 2022-01-01

## 2022-03-23 PROBLEM — U07.1 COVID-19: Status: ACTIVE | Noted: 2022-01-01

## 2022-03-23 NOTE — PROGRESS NOTES
Patient arrived to floor via cot with wife at side. No s/s of distress noted. Denies pain. Oriented to room, call light and staff.

## 2022-03-23 NOTE — H&P
Department of Internal Medicine        CHIEF COMPLAINT: Shortness of breath    Reason for Admission: Acute COVID-19 pneumonia, hypoxic respiratory failure    HISTORY OF PRESENT ILLNESS:      The patient is a 79 y.o. male who presents with increased shortness of breath. Patient's wife is at the bedside. Patient normally is on 3 L nasal cannula at home with history of COPD. Patient had increased shortness of breath for the last 14 days with cough and congestion. Symptoms has progressively worsened. Patient's wife stated the patient has been in bed for the last 4 days. Patient has never been vaccinated or boosted before the COVID-19 infection. He has decreased appetite. He denies any fever/chills, nausea/vomiting, chest pain, abdominal pain, diarrhea or constipation. Patient's BUN/creatinine was 26/1.8 with normal liver enzymes and WBC 5.9 hemoglobin 14.9. D-dimer is 534. Rapid Covid test was positive. Temperature 97.6 with heart rate of 60 and blood pressure 101/61. O2 sat 98% on 4 L nasal cannula. Chest x-ray showed ill-defined patchy medial left upper lobe infiltrate and COPD. Past Medical History:    Past Medical History:   Diagnosis Date    COPD (chronic obstructive pulmonary disease) ([facility]     Hypertension     Oxygen dependent     2 l doesnt use continuous     Past Surgical History:    Past Surgical History:   Procedure Laterality Date    HERNIA REPAIR      age 61   [employer] SHOULDER ARTHROSCOPY Right 9/9/2020    RIGHT SHOULDER ARTHROSCOPY, SUBACROMIAL DECOMPRESSION,  DEBRIDEMENT LABRIUM AND ROTATOR CUFF, CHONDROPLASTY (ARTHREX) performed by Dede Wheeler DO at [address]       Medications Prior to Admission:    @  Prior to Admission medications    Medication Sig Start Date End Date Taking?  Authorizing Provider   lisinopril-hydroCHLOROthiazide (PRINZIDE;ZESTORETIC) 20-12.5 MG per tablet Take 1 tablet by mouth daily 3/8/22   Min Lisa DO   budesonide (PULMICORT) 0.5 MG/2ML nebulizer suspension inhale 1 vial via nebulizer twice daily 12/16/21   Historical Provider, MD   formoterol (PERFOROMIST) 20 MCG/2ML nebulizer solution inhale 1 unit dose via nebulizer twice daily 12/16/21   Historical Provider, MD   budesonide-formoterol [facility]) 160-4.5 MCG/ACT AERO  10/22/21   Historical Provider, MD   tiotropium (SPIRIVA RESPIMAT) 2.5 MCG/ACT AERS inhaler Take by mouth 10/22/21   Historical Provider, MD   lisinopril-hydroCHLOROthiazide (PRINZIDE;ZESTORETIC) 20-12.5 MG per tablet TAKE ONE TABLET BY MOUTH DAILY 11/12/21   Hermes Bishop DO   omeprazole (PRILOSEC) 20 MG delayed release capsule Take 1 capsule by mouth every morning (before breakfast) 11/12/21   Hermes Bishop,    omeprazole (PRILOSEC) 20 MG delayed release capsule TAKE ONE CAPSULE BY MOUTH EVERY MORNING 5/27/21   Historical Provider, MD   azelastine (ASTELIN) 0.1 % nasal spray 1-2 sprays by Nasal route 2 times daily as needed for Rhinitis Use in each nostril as directed 6/21/21   INDIGO Klein CNP   fluticasone [facility]) 50 MCG/ACT nasal spray 2 sprays by Each Nostril route daily 12/21/20   INDIGO Klein CNP   OXYGEN Inhale 2 L into the lungs intermittent    Historical Provider, MD   Cyanocobalamin (VITAMIN B 12 PO) Take by mouth daily    Historical Provider, MD   Cholecalciferol (VITAMIN D3) 50 MCG (2000 UT) TABS Take 2 tablets by mouth daily    Historical Provider, MD   aspirin 81 MG EC tablet Take 81 mg by mouth daily Last dose 2 weeks ago    Historical Provider, MD   albuterol (PROVENTIL) (2.5 MG/3ML) 0.083% nebulizer solution INHALE THE CONTENTS OF 1 VIAL (3 ML) VIA NEBULIZER 4 TIMES DAILY AS NEEDED WHEN COUGHING  WHEEZING  OR SHORT OF BREATH 2/27/20   Historical Provider, MD   albuterol sulfate  (90 Base) MCG/ACT inhaler Inhale 2 puffs into the lungs 4 times daily as needed for Wheezing 12/24/19   Hermes [place], DO       Allergies:  Penicillins    Social History:   Social History     Socioeconomic History    Marital status:      Spouse name: Not on file    Number of children: Not on file    Years of education: Not on file    Highest education level: Not on file   Occupational History    Not on file   Tobacco Use    Smoking status: Former Smoker     Packs/day: 1.00     Years: 60.00     Pack years: 60.00     Quit date: 2016     Years since quittin.3    Smokeless tobacco: Never Used   Vaping Use    Vaping Use: Never used   Substance and Sexual Activity    Alcohol use: No    Drug use: No    Sexual activity: Not on file   Other Topics Concern    Not on file   Social History Narrative    Not on file     Social Determinants of Health     Financial Resource Strain: Low Risk     Difficulty of Paying Living Expenses: Not hard at all   Food Insecurity: No Food Insecurity    Worried About [address] in the Last Year: Never true    [address] [employer] in the Last Year: Never true   Transportation Needs: No Transportation Needs    Lack of Transportation (Medical): No    Lack of Transportation (Non-Medical):  No   Physical Activity:     Days of Exercise per Week: Not on file    Minutes of Exercise per Session: Not on file   Stress:     Feeling of Stress : Not on file   Social Connections:     Frequency of Communication with Friends and Family: Not on file    Frequency of Social Gatherings with Friends and Family: Not on file    Attends Evangelical Services: Not on file    Active Member of Clubs or Organizations: Not on file    Attends Club or Organization Meetings: Not on file    Marital Status: Not on file   Intimate Partner Violence:     Fear of Current or Ex-Partner: Not on file    Emotionally Abused: Not on file    Physically Abused: Not on file    Sexually Abused: Not on file   Housing Stability:     Unable to Pay for Housing in the Last Year: Not on file    Number of [place] in the Last Year: Not on file    Unstable Housing in the Last Year: Not on file Family History:   History reviewed. No pertinent family history. REVIEW OF SYSTEMS:    Gen: Patient denies any lightheadedness or dizziness. No LOC or syncope. No fevers or chills. HEENT: No earache, sore throat or nasal congestion. Resp: Denies cough, hemoptysis or sputum production. Cardiac: Denies chest pain, +SOB,no diaphoresis or palpitations. GI: No nausea, vomiting, diarrhea or constipation. No melena or hematochezia. : No urinary complaints, dysuria, hematuria or frequency. MSK: No extremity weakness, paralysis or paresthesias. PHYSICAL EXAM:    Vitals:  /61   Pulse 68   Temp 97.6 °F (36.4 °C) (Infrared)   Resp 22   Ht 5' 10\" (1.778 m)   Wt 178 lb 12.8 oz (81.1 kg)   SpO2 99%   BMI 25.66 kg/m²     General:  This is a 79 y.o. yo male who is alert and oriented in mild respiratory distress  HEENT:  Head is normocephalic and atraumatic, PERRLA, EOMI, mucus membranes moist with no pharyngeal erythema or exudate. Neck:  Supple with no carotid bruits, JVD or thyromegaly.   No cervical adenopathy  CV:  Regular rate and rhythm, no murmurs  Lungs: Coarse decreased breath sounds to auscultation bilaterally with no wheezes, rales or rhonchi  Abdomen:  Soft, nontender, nondistended, bowel sounds present  Extremities:  No edema, peripheral pulses intact bilaterally  Neuro:  Cranial nerves II-XII grossly intact; motor and sensory function intact with no focal deficits  Skin:  No rashes, lesions or wounds    DATA:  CBC with Differential:    Lab Results   Component Value Date    WBC 5.9 03/23/2022    RBC 4.72 03/23/2022    HGB 14.9 03/23/2022    HCT 46.3 03/23/2022     03/23/2022    MCV 98.1 03/23/2022    MCH 31.6 03/23/2022    MCHC 32.2 03/23/2022    RDW 14.3 03/23/2022    LYMPHOPCT 7.1 03/23/2022    MONOPCT 8.5 03/23/2022    BASOPCT 0.3 03/23/2022    MONOSABS 0.50 03/23/2022    LYMPHSABS 0.42 03/23/2022    EOSABS 0.00 03/23/2022    BASOSABS 0.02 03/23/2022     CMP: Lab Results   Component Value Date     03/23/2022    K 4.3 03/23/2022     03/23/2022    CO2 22 03/23/2022    BUN 26 03/23/2022    CREATININE 1.8 03/23/2022    GFRAA 44 03/23/2022    LABGLOM 36 03/23/2022    GLUCOSE 102 03/23/2022    PROT 6.9 03/23/2022    LABALBU 3.5 03/23/2022    CALCIUM 8.4 03/23/2022    BILITOT 0.7 03/23/2022    ALKPHOS 74 03/23/2022    AST 28 03/23/2022    ALT 20 03/23/2022     Magnesium:    Lab Results   Component Value Date    MG 1.9 03/23/2022     Phosphorus:  No results found for: PHOS  PT/INR:  No results found for: PROTIME, INR  Troponin:  No results found for: TROPONINI  U/A:  No results found for: NITRITE, COLORU, PROTEINU, PHUR, LABCAST, WBCUA, RBCUA, MUCUS, TRICHOMONAS, YEAST, BACTERIA, CLARITYU, SPECGRAV, LEUKOCYTESUR, UROBILINOGEN, BILIRUBINUR, BLOODU, GLUCOSEU, AMORPHOUS  ABG:  No results found for: PH, PCO2, PO2, HCO3, BE, THGB, TCO2, O2SAT  HgBA1c:    Lab Results   Component Value Date    LABA1C 5.5 12/01/2021     FLP:    Lab Results   Component Value Date    TRIG 93 12/01/2021    HDL 55 12/01/2021    LDLCALC 147 12/01/2021    LABVLDL 19 12/01/2021     TSH:    Lab Results   Component Value Date    TSH 4.440 12/01/2021     IRON:  No results found for: IRON  LIPASE:  No results found for: LIPASE    ASSESSMENT AND PLAN:      Patient Active Problem List    Diagnosis Date Noted    COPD exacerbation ([facility] 03/23/2022    COVID-19 03/23/2022    Chronic bronchitis ([facility] 04/20/2021    Shoulder impingement, right 08/17/2020    Nontraumatic complete tear of right rotator cuff 08/17/2020     Impression:  1. COVID-19 infection  2. Acute on chronic hypoxic respiratory failure  3. COPD with exacerbation  4.   Hypertension    Plan:  Admit to monitored floor  Home medications reviewed  Monitor heart rate, blood pressure O2 saturation  Contact and droplet isolation  Consult pharmacy for COVID protocol  Decadron 6 mg IV push daily  Rocephin 1 g IV piggyback daily  Vibramycin 100 mg IV piggyback twice daily   Procalcitonin level  Urine for strep, antigen  Sputum culture sensitivity  Vitamin C 1 g p.o. daily, zinc 50 mg p.o. daily, vitamin B6 50 mg p.o. daily, vitamin B1 100 mg daily  Incentive spirometry every 1-2 hours while awake    CMP, CBC, D-dimer in .Kimberlyn Bolanos DO, D.O.  3/23/2022  3:43 PM

## 2022-03-23 NOTE — ED PROVIDER NOTES
[address]      Pt Name: Lindsay Duenas  MRN: [id number]  [place] 1940  Date of evaluation: 3/23/2022      CHIEF COMPLAINT       Chief Complaint   Patient presents with    Shortness of Breath     hx of copd        HPI  Lindsay Duenas is a 79 y.o. male history of COPD on 3 L nasal cannula home presents with complaints of shortness of breath. Per patient has had cough and congestion for the last 7 days. Progressively worsening. No alleviating factors. Admits to sick contacts. Patient has never been vaccinated or boosted from COVID-19 infection. Patient wife states that he has been in bed for the last 4 days. With decreased appetite. She states that he has barely \"drank anything or eaten anything in the last 4 days. \"  Denies fevers, chills, nausea, vomiting, chest pain, abdominal pain, flank pain, dysuria, hematuria, diarrhea, constipation, new rashes or sores. Except as noted above the remainder of the review of systems was reviewed and negative. Review of Systems   Constitutional: Positive for appetite change and fatigue. Negative for chills and fever. HENT: Positive for congestion. Negative for rhinorrhea, trouble swallowing and voice change. Eyes: Negative for visual disturbance. Respiratory: Positive for cough. Negative for apnea, choking, chest tightness, shortness of breath and wheezing. Cardiovascular: Negative for chest pain, palpitations and leg swelling. Gastrointestinal: Negative for abdominal distention, nausea and vomiting. Endocrine: Negative for polyuria. Genitourinary: Negative for dysuria and hematuria. Musculoskeletal: Positive for myalgias. Negative for arthralgias. Skin: Negative for color change, pallor, rash and wound. Neurological: Negative for dizziness, syncope, weakness, light-headedness and headaches. Psychiatric/Behavioral: Negative for agitation. Physical Exam  Vitals and nursing note reviewed. Constitutional:       General: He is not in acute distress. Appearance: Normal appearance. He is not ill-appearing or diaphoretic. HENT:      Head: Normocephalic and atraumatic. Nose: Nose normal.      Mouth/Throat:      Comments: Dry oral mucosa  Eyes:      Extraocular Movements: Extraocular movements intact. Pupils: Pupils are equal, round, and reactive to light. Cardiovascular:      Rate and Rhythm: Normal rate and regular rhythm. Pulses: Normal pulses. Heart sounds: Normal heart sounds. Pulmonary:      Effort: Pulmonary effort is normal.      Breath sounds: Examination of the right-upper field reveals decreased breath sounds. Examination of the left-upper field reveals decreased breath sounds. Examination of the right-middle field reveals decreased breath sounds. Examination of the left-middle field reveals decreased breath sounds. Examination of the right-lower field reveals decreased breath sounds. Examination of the left-lower field reveals decreased breath sounds. Decreased breath sounds present. No wheezing, rhonchi or rales. Abdominal:      General: Bowel sounds are normal.      Palpations: Abdomen is soft. Tenderness: There is no abdominal tenderness. There is no right CVA tenderness, left CVA tenderness or rebound. Negative signs include Glover's sign, Rovsing's sign and McBurney's sign. Musculoskeletal:         General: Normal range of motion. Cervical back: Normal range of motion. Skin:     General: Skin is warm and dry. Capillary Refill: Capillary refill takes less than 2 seconds. Neurological:      General: No focal deficit present. Mental Status: He is alert and oriented to person, place, and time.    Psychiatric:         Mood and Affect: Mood normal.          Procedures     MDM  Number of Diagnoses or Management Options  Chronic kidney disease, unspecified CKD stage  COPD exacerbation ([address]  COVID-19 virus infection  Diagnosis management comments: 76-year-old male history of hypertension, COPD on 3 L nasal cannula at home, never been vaccinated from COVID-19 presents with complaints of shortness of breath. Vitals significant for tachypnea. Patient is conversationally dyspneic. With pursed lipped breathing. Diminished breath sounds throughout all lung fields. Normal S1-S2. Abdomen soft nontender with no rebound or guarding. No bilateral leg edema. Previous echo showed patient has a 40% ejection fraction. Patient is on 6 L nasal cannula increased from his 3 L nasal cannula saturating at 92%. Diagnostic labs and imaging interpreted reviewed. Patient has COVID-19. Chest x-ray consistent with COPD. No signs of consolidations. EKG showed normal sinus rhythm. Patient does have an elevated D-dimer. I do not believe the patient has a pulmonary embolism. I think this is COPD exacerbation with a COVID-19 infection. He was given DuoNeb treatments in the department, Solu-Medrol, and IV fluids. He has been gently hydrated with 500 cc NS at an time of normal saline as to not fluid overload him. Patient and his wife are informed of all the results of evaluation. They are agreeable plan for admission. Amount and/or Complexity of Data Reviewed  Decide to obtain previous medical records or to obtain history from someone other than the patient: yes         ED Course as of 03/23/22 1250   Wed Mar 23, 2022   [address]:     I have personally performed and/or participated in the history, exam, medical decision making, and procedures and agree with all pertinent clinical information unless otherwise noted. I have also reviewed and agree with the past medical, family and social history unless otherwise noted.     I have discussed this patient in detail with the resident and provided the instruction and education regarding the evidence-based evaluation and treatment of SOB. History: patient presents from home complaining of cough and congestion for 1 week. My findings: Poppy Hernandez is a 79 y.o. male whom is in mild distress. Physical exam reveals conversationally dyspneic. Heart RRR, abdomen is soft and nontender. No peripheral edema or tenderness. He is weak and ill in appearance without focal neurologic deficits. My plan: Symptomatic and supportive care. Will evaluate with labs, imaging and sepsis evaluation. Electronically signed by Daphney York DO on 3/23/22 at 9:37 AM EDT       [JS]   2190 EKG entered by me. Heart rate 87. Sinus rhythm. Modest deviation. QTc 400. No ST elevations or depressions. Stable as compared to previous EKG. [JV]   1130 Patient reevaluated bedside. Saturating at 88% on 6 L nasal cannula. Patient is usually on 3 L nasal cannula. Place patient on nonrebreather at 15 L initially. Got to 97%. Goal is between 90 to 90% on SPO2. Patient has been admitted to [facility]. [JV]      ED Course User Index  [JS] Daphney York DO  [JV] Janelle President, MD             --------------------------------------------- PAST HISTORY ---------------------------------------------  Past Medical History:  has a past medical history of COPD (chronic obstructive pulmonary disease) ([facility] Hypertension, and Oxygen dependent. Past Surgical History:  has a past surgical history that includes hernia repair and Shoulder arthroscopy (Right, 9/9/2020). Social History:  reports that he quit smoking about 5 years ago. He has a 60.00 pack-year smoking history. He has never used smokeless tobacco. He reports that he does not drink alcohol and does not use drugs. Family History: family history is not on file. The patients home medications have been reviewed.     Allergies: Penicillins    -------------------------------------------------- RESULTS -------------------------------------------------    LABS:  Results for orders placed or performed during the hospital encounter of 03/23/22   COVID-19, Rapid    Specimen: Nasopharyngeal Swab   Result Value Ref Range    SARS-CoV-2, NAAT DETECTED (A) Not Detected   CBC with Auto Differential   Result Value Ref Range    WBC 5.9 4.5 - 11.5 E9/L    RBC 4.72 3.80 - 5.80 E12/L    Hemoglobin 14.9 12.5 - 16.5 g/dL    Hematocrit 46.3 37.0 - 54.0 %    MCV 98.1 80.0 - 99.9 fL    MCH 31.6 26.0 - 35.0 pg    MCHC 32.2 32.0 - 34.5 %    RDW 14.3 11.5 - 15.0 fL    Platelets 713 (L) 154 - 450 E9/L    MPV 10.2 7.0 - 12.0 fL    Neutrophils % 83.6 (H) 43.0 - 80.0 %    Immature Granulocytes % 0.5 0.0 - 5.0 %    Lymphocytes % 7.1 (L) 20.0 - 42.0 %    Monocytes % 8.5 2.0 - 12.0 %    Eosinophils % 0.0 0.0 - 6.0 %    Basophils % 0.3 0.0 - 2.0 %    Neutrophils Absolute 4.92 1.80 - 7.30 E9/L    Immature Granulocytes # 0.03 E9/L    Lymphocytes Absolute 0.42 (L) 1.50 - 4.00 E9/L    Monocytes Absolute 0.50 0.10 - 0.95 E9/L    Eosinophils Absolute 0.00 (L) 0.05 - 0.50 E9/L    Basophils Absolute 0.02 0.00 - 0.20 E9/L   Comprehensive Metabolic Panel   Result Value Ref Range    Sodium 137 132 - 146 mmol/L    Potassium 4.3 3.5 - 5.0 mmol/L    Chloride 101 98 - 107 mmol/L    CO2 22 22 - 29 mmol/L    Anion Gap 14 7 - 16 mmol/L    Glucose 102 (H) 74 - 99 mg/dL    BUN 26 (H) 6 - 23 mg/dL    CREATININE 1.8 (H) 0.7 - 1.2 mg/dL    GFR Non-African American 36 >=60 mL/min/1.73    GFR African American 44     Calcium 8.4 (L) 8.6 - 10.2 mg/dL    Total Protein 6.9 6.4 - 8.3 g/dL    Albumin 3.5 3.5 - 5.2 g/dL    Total Bilirubin 0.7 0.0 - 1.2 mg/dL    Alkaline Phosphatase 74 40 - 129 U/L    ALT 20 0 - 40 U/L    AST 28 0 - 39 U/L   Magnesium   Result Value Ref Range    Magnesium 1.9 1.6 - 2.6 mg/dL   Troponin   Result Value Ref Range    Troponin, High Sensitivity 14 (H) 0 - 11 ng/L   Lactate, Sepsis   Result Value Ref Range    Lactic Acid, Sepsis 1.4 0.5 - 1.9 mmol/L   D-Dimer, Quantitative   Result Value Ref Range    D-Dimer, Quant 534 ng/mL DDU Fibrinogen   Result Value Ref Range    Fibrinogen 509 225 - 540 mg/dL   EKG 12 Lead   Result Value Ref Range    Ventricular Rate 87 BPM    Atrial Rate 87 BPM    P-R Interval 130 ms    QRS Duration 76 ms    Q-T Interval 348 ms    QTc Calculation (Bazett) 418 ms    P Axis 75 degrees    R Axis 72 degrees    T Axis 58 degrees       RADIOLOGY:  XR CHEST PORTABLE   Final Result   Ill-defined, patchy medial left upper lobe infiltrate. COPD again   identified, when compared to the previous study performed 03/08/2021. EKG:  Please refer to work-up tab for EKG read.    ------------------------- NURSING NOTES AND VITALS REVIEWED ---------------------------  Date / Time Roomed:  3/23/2022  9:15 AM  ED Bed Assignment:  07/07    The nursing notes within the ED encounter and vital signs as below have been reviewed. Patient Vitals for the past 24 hrs:   BP Temp Temp src Pulse Resp SpO2 Height Weight   03/23/22 1209 (!) 96/58 98.9 °F (37.2 °C)  92 20 94 %     03/23/22 1026 (!) 109/51   84 18      03/23/22 0925 120/66 100 °F (37.8 °C) Oral 86 18 94 % 5' 10\" (1.778 m) 182 lb (82.6 kg)       Oxygen Saturation Interpretation: Improved after treatment    ------------------------------------------ PROGRESS NOTES ------------------------------------------    Counseling:  I have spoken with the patient and discussed todays results, in addition to providing specific details for the plan of care and counseling regarding the diagnosis and prognosis. Their questions are answered at this time and they are agreeable with the plan of admission.    --------------------------------- ADDITIONAL PROVIDER NOTES ---------------------------------  Consultations:  Spoke with Dr. Lily Plascencia. Discussed case. They will admit the patient.     This patient's ED course included: a personal history and physicial examination, re-evaluation prior to disposition, multiple bedside re-evaluations, IV medications, cardiac monitoring and continuous pulse oximetry    This patient has remained hemodynamically stable during their ED course. Diagnosis:  1. COPD exacerbation ([address]    2. COVID-19 virus infection    3. Chronic kidney disease, unspecified CKD stage        Disposition:  Patient's disposition: Admit to telemetry  Patient's condition is fair.           Esteban Beavers MD  Resident  03/23/22 4807

## 2022-03-23 NOTE — PLAN OF CARE
Patient is alert and oriented x4, but forgetful. Patient allows wife to manage all care at home as far as medications and appointments. Patient denies history of CHF, Afib. Wears oxygen at 4L via NC at home.

## 2022-03-23 NOTE — Clinical Note
Discharge Plan[de-identified] Other/[facility])   Telemetry/Cardiac Monitoring Required?: Yes
negative...

## 2022-03-24 NOTE — PROGRESS NOTES
 SHOULDER ARTHROSCOPY Right 9/9/2020    RIGHT SHOULDER ARTHROSCOPY, SUBACROMIAL DECOMPRESSION,  DEBRIDEMENT LABRIUM AND ROTATOR CUFF, CHONDROPLASTY (ARTHREX) performed by Keyshawn Alvarado DO at [address]       Medications Prior to Admission:    @  Prior to Admission medications    Medication Sig Start Date End Date Taking?  Authorizing Provider   lisinopril-hydroCHLOROthiazide (PRINZIDE;ZESTORETIC) 20-12.5 MG per tablet Take 1 tablet by mouth daily 3/8/22   Felipe Weston DO   budesonide (PULMICORT) 0.5 MG/2ML nebulizer suspension inhale 1 vial via nebulizer twice daily 12/16/21   Historical Provider, MD   formoterol (PERFOROMIST) 20 MCG/2ML nebulizer solution inhale 1 unit dose via nebulizer twice daily 12/16/21   Historical Provider, MD   budesonide-formoterol [facility]) 160-4.5 MCG/ACT AERO  10/22/21   Historical Provider, MD   tiotropium (SPIRIVA RESPIMAT) 2.5 MCG/ACT AERS inhaler Take by mouth 10/22/21   Historical Provider, MD   lisinopril-hydroCHLOROthiazide (PRINZIDE;ZESTORETIC) 20-12.5 MG per tablet TAKE ONE TABLET BY MOUTH DAILY 11/12/21   Felipe Weston DO   omeprazole (PRILOSEC) 20 MG delayed release capsule Take 1 capsule by mouth every morning (before breakfast) 11/12/21   Felipe Weston DO   omeprazole (PRILOSEC) 20 MG delayed release capsule TAKE ONE CAPSULE BY MOUTH EVERY MORNING 5/27/21   Historical Provider, MD   azelastine (ASTELIN) 0.1 % nasal spray 1-2 sprays by Nasal route 2 times daily as needed for Rhinitis Use in each nostril as directed 6/21/21   INDIGO Benedict CNP   fluticasone [facility]) 50 MCG/ACT nasal spray 2 sprays by Each Nostril route daily 12/21/20   INDIGO Benedict CNP   OXYGEN Inhale 2 L into the lungs intermittent    Historical Provider, MD   Cyanocobalamin (VITAMIN B 12 PO) Take by mouth daily    Historical Provider, MD   Cholecalciferol (VITAMIN D3) 50 MCG (2000 UT) TABS Take 2 tablets by mouth daily    Historical Provider, MD   aspirin 81 MG EC tablet Take 81 mg by mouth daily Last dose 2 weeks ago    Historical Provider, MD   albuterol (PROVENTIL) (2.5 MG/3ML) 0.083% nebulizer solution INHALE THE CONTENTS OF 1 VIAL (3 ML) VIA NEBULIZER 4 TIMES DAILY AS NEEDED WHEN COUGHING  WHEEZING  OR SHORT OF BREATH 20   Historical Provider, MD   albuterol sulfate  (90 Base) MCG/ACT inhaler Inhale 2 puffs into the lungs 4 times daily as needed for Wheezing 19   Min Lisa, DO       Allergies:  Penicillins    Social History:   Social History     Socioeconomic History    Marital status:      Spouse name: Not on file    Number of children: Not on file    Years of education: Not on file    Highest education level: Not on file   Occupational History    Not on file   Tobacco Use    Smoking status: Former Smoker     Packs/day: 1.00     Years: 60.00     Pack years: 60.00     Quit date: 2016     Years since quittin.3    Smokeless tobacco: Never Used   Vaping Use    Vaping Use: Never used   Substance and Sexual Activity    Alcohol use: No    Drug use: No    Sexual activity: Not on file   Other Topics Concern    Not on file   Social History Narrative    Not on file     Social Determinants of Health     Financial Resource Strain: Low Risk     Difficulty of Paying Living Expenses: Not hard at all   Food Insecurity: No Food Insecurity    Worried About Running Out of Food in the Last Year: Never true    Joselin of Food in the Last Year: Never true   Transportation Needs: No Transportation Needs    Lack of Transportation (Medical): No    Lack of Transportation (Non-Medical):  No   Physical Activity:     Days of Exercise per Week: Not on file    Minutes of Exercise per Session: Not on file   Stress:     Feeling of Stress : Not on file   Social Connections:     Frequency of Communication with Friends and Family: Not on file    Frequency of Social Gatherings with Friends and Family: Not on file    Attends Congregation Services: Not on file   [address] [facility] [employer] or Organizations: Not on file    Attends Club or Organization Meetings: Not on file    Marital Status: Not on file   Intimate Partner Violence:     Fear of Current or Ex-Partner: Not on file    Emotionally Abused: Not on file    Physically Abused: Not on file    Sexually Abused: Not on file   Housing Stability:     Unable to Pay for Housing in the Last Year: Not on file    Number of [place] in the Last Year: Not on file    Unstable Housing in the Last Year: Not on file       Family History:   History reviewed. No pertinent family history. REVIEW OF SYSTEMS:    Gen: Patient denies any lightheadedness or dizziness. No LOC or syncope. No fevers or chills. HEENT: No earache, sore throat or nasal congestion. Resp: Denies cough, hemoptysis or sputum production. Cardiac: Denies chest pain, +SOB,no diaphoresis or palpitations. GI: No nausea, vomiting, diarrhea or constipation. No melena or hematochezia. : No urinary complaints, dysuria, hematuria or frequency. MSK: No extremity weakness, paralysis or paresthesias. PHYSICAL EXAM:    Vitals:  BP 94/64   Pulse 65   Temp 97.2 °F (36.2 °C) (Temporal)   Resp 20   Ht 5' 10\" (1.778 m)   Wt 178 lb 12.8 oz (81.1 kg)   SpO2 96%   BMI 25.66 kg/m²     General:  This is a 79 y.o. yo male who is alert and oriented in mild respiratory distress  HEENT:  Head is normocephalic and atraumatic, PERRLA, EOMI, mucus membranes moist with no pharyngeal erythema or exudate. Neck:  Supple with no carotid bruits, JVD or thyromegaly.   No cervical adenopathy  CV:  Regular rate and rhythm, no murmurs  Lungs: Coarse decreased breath sounds to auscultation bilaterally with no wheezes, rales or rhonchi  Abdomen:  Soft, nontender, nondistended, bowel sounds present  Extremities:  No edema, peripheral pulses intact bilaterally  Neuro:  Cranial nerves II-XII grossly intact; motor and sensory function intact with no focal deficits  Skin:  No rashes, lesions or wounds    DATA:  CBC with Differential:    Lab Results   Component Value Date    WBC 4.6 03/24/2022    RBC 4.55 03/24/2022    HGB 14.3 03/24/2022    HCT 44.4 03/24/2022     03/24/2022    MCV 97.6 03/24/2022    MCH 31.4 03/24/2022    MCHC 32.2 03/24/2022    RDW 14.3 03/24/2022    LYMPHOPCT 7.0 03/24/2022    MONOPCT 3.0 03/24/2022    BASOPCT 0.0 03/24/2022    MONOSABS 0.14 03/24/2022    LYMPHSABS 0.32 03/24/2022    EOSABS 0.00 03/24/2022    BASOSABS 0.00 03/24/2022     CMP:    Lab Results   Component Value Date     03/24/2022    K 4.0 03/24/2022     03/24/2022    CO2 19 03/24/2022    BUN 33 03/24/2022    CREATININE 1.5 03/24/2022    GFRAA 54 03/24/2022    LABGLOM 45 03/24/2022    GLUCOSE 124 03/24/2022    PROT 6.5 03/24/2022    LABALBU 3.2 03/24/2022    CALCIUM 8.5 03/24/2022    BILITOT 0.4 03/24/2022    ALKPHOS 65 03/24/2022    AST 24 03/24/2022    ALT 18 03/24/2022     Magnesium:    Lab Results   Component Value Date    MG 2.0 03/24/2022     Phosphorus:  No results found for: PHOS  PT/INR:  No results found for: PROTIME, INR  Troponin:  No results found for: TROPONINI  U/A:    Lab Results   Component Value Date    COLORU DKYELLOW 03/24/2022    PROTEINU Negative 03/24/2022    PHUR 5.5 03/24/2022    WBCUA 1-3 03/24/2022    RBCUA NONE 03/24/2022    BACTERIA MANY 03/24/2022    CLARITYU Clear 03/24/2022    SPECGRAV >=1.030 03/24/2022    LEUKOCYTESUR Negative 03/24/2022    UROBILINOGEN 0.2 03/24/2022    BILIRUBINUR Negative 03/24/2022    BLOODU Negative 03/24/2022    GLUCOSEU Negative 03/24/2022     ABG:  No results found for: PH, PCO2, PO2, HCO3, BE, THGB, TCO2, O2SAT  HgBA1c:    Lab Results   Component Value Date    LABA1C 5.5 12/01/2021     FLP:    Lab Results   Component Value Date    TRIG 66 03/24/2022    HDL 45 03/24/2022    LDLCALC 110 03/24/2022    LABVLDL 13 03/24/2022     TSH:    Lab Results   Component Value Date    TSH 1.070 03/24/2022     IRON:  No results found for: IRON  LIPASE:  No results found for: LIPASE    ASSESSMENT AND PLAN:      Patient Active Problem List    Diagnosis Date Noted    COPD exacerbation ([facility] 03/23/2022    COVID-19 03/23/2022    Chronic bronchitis ([facility] 04/20/2021    Shoulder impingement, right 08/17/2020    Nontraumatic complete tear of right rotator cuff 08/17/2020     Impression:  1. COVID-19 infection  2. Acute on chronic hypoxic respiratory failure  3. COPD with exacerbation  4. Hypertension    Plan:  Admit to monitored floor  Home medications reviewed  Monitor heart rate, blood pressure O2 saturation  Contact and droplet isolation  Consult pharmacy for COVID protocol  Decadron 6 mg IV push daily  Rocephin 1 g IV piggyback daily  Vibramycin 100 mg IV piggyback twice daily   Procalcitonin level  Urine for strep, antigen  Sputum culture sensitivity  Vitamin C 1 g p.o. daily, zinc 50 mg p.o. daily, vitamin B6 50 mg p.o. daily, vitamin B1 100 mg daily  Incentive spirometry every 1-2 hours while awake    Urine culture  Baricitinib 2 mg p.o. daily  O2 saturation on 3 L nasal cannula at rest and with activity while ambulating in the hallway and monitoring heart rate    CMP, CBC, D-dimer in a.m.     Ronak Mar DO, D.OKimberlyn  3/24/2022  12:32 PM

## 2022-03-24 NOTE — CARE COORDINATION
3-24-Cm note: ( covid pos 3-23) spoke to pt via phone for transition of care needs, pt is independent, he has 4l nc at home (pt unsure of DME company) pt has no other DME, he denies any other needs for dc ,pt's wife will provide transport home when dc's .  CM/SS will follow Electronically signed by Berta Granados RN on 3/24/2022 at 2:40 PM

## 2022-03-24 NOTE — ACP (ADVANCE CARE PLANNING)
Advance Care Planning     Advance Care Planning Activator (Inpatient)  Conversation Note      Date of ACP Conversation: 3/24/2022     Conversation Conducted with: Patient with Decision Making Capacity    ACP Activator: Lucius Garcia, [facility] Decision Maker:     Current Designated Health Care Decision Maker:     Primary Decision Maker: Rola Michael Spouse - [phone]      Care Preferences    Ventilation: \"If you were in your present state of health and suddenly became very ill and were unable to breathe on your own, what would your preference be about the use of a ventilator (breathing machine) if it were available to you? \"      Would the patient desire the use of ventilator (breathing machine)?: yes    \"If your health worsens and it becomes clear that your chance of recovery is unlikely, what would your preference be about the use of a ventilator (breathing machine) if it were available to you? \"     Would the patient desire the use of ventilator (breathing machine)?: No      Resuscitation  \"CPR works best to restart the heart when there is a sudden event, like a heart attack, in someone who is otherwise healthy. Unfortunately, CPR does not typically restart the heart for people who have serious health conditions or who are very sick. \"    \"In the event your heart stopped as a result of an underlying serious health condition, would you want attempts to be made to restart your heart (answer \"yes\" for attempt to resuscitate) or would you prefer a natural death (answer \"no\" for do not attempt to resuscitate)? \" yes       [] Yes   [] No   Educated Patient / Nba Malone regarding differences between Advance Directives and portable DNR orders.     Length of ACP Conversation in minutes:      Conversation Outcomes:  [x] ACP discussion completed  [] Existing advance directive reviewed with patient; no changes to patient's previously recorded wishes  [] New Advance Directive completed  [] Portable Do Not Rescitate prepared for Provider review and signature  [] POLST/POST/MOLST/MOST prepared for Provider review and signature      Follow-up plan:    [] Schedule follow-up conversation to continue planning  [] Referred individual to Provider for additional questions/concerns   [] Advised patient/agent/surrogate to review completed ACP document and update if needed with changes in condition, patient preferences or care setting    [x] This note routed to one or more involved healthcare providers

## 2022-03-25 NOTE — PROGRESS NOTES
CLINICAL PHARMACY NOTE: MEDS TO BEDS    Total # of Prescriptions Filled: 2   The following medications were delivered to the patient:  · Dexamethasone 6mg  · Levofloxacin 250mg    Additional Documentation:

## 2022-03-25 NOTE — CARE COORDINATION
SS Note: Covid negative 3/23/22. Pt on 3 liters. Pt wears 4 liters Home O2 at home. Pt plans to return home upon discharge, wife to transport home, denies any needs.    Electronically signed by NIDHI Cali on 3/25/2022 at 2:13 PM

## 2022-03-25 NOTE — PROGRESS NOTES
Pulse ox was 87% on 3L at rest.  Pulse ox was 91% on 4L at rest.  Ambulated patient on 4L. Oxygen saturation was 87% on 4L while ambulating. Oxygen applied. Recovery pulse ox was 92% on 5 liters of oxygen while ambulating.

## 2022-03-25 NOTE — PROGRESS NOTES
Department of Internal Medicine        CHIEF COMPLAINT: Shortness of breath    Reason for Admission: Acute COVID-19 pneumonia, hypoxic respiratory failure    HISTORY OF PRESENT ILLNESS:      The patient is a 79 y.o. male who presents with increased shortness of breath. Patient's wife is at the bedside. Patient normally is on 3 L nasal cannula at home with history of COPD. Patient had increased shortness of breath for the last 14 days with cough and congestion. Symptoms has progressively worsened. Patient's wife stated the patient has been in bed for the last 4 days. Patient has never been vaccinated or boosted before the COVID-19 infection. He has decreased appetite. He denies any fever/chills, nausea/vomiting, chest pain, abdominal pain, diarrhea or constipation. Patient's BUN/creatinine was 26/1.8 with normal liver enzymes and WBC 5.9 hemoglobin 14.9. D-dimer is 534. Rapid Covid test was positive. Temperature 97.6 with heart rate of 60 and blood pressure 101/61. O2 sat 98% on 4 L nasal cannula. Chest x-ray showed ill-defined patchy medial left upper lobe infiltrate and COPD.    3/24/2022  Patient seen examined on [address]. Patient states he feels fairly good. Patient states he is not back to his baseline but he is doing better. He denies any chest or abdominal pain. There is no productive cough. BUN/creatinine is improved at 33/1.5. Liver enzymes are normal with a WBC 4.6 and hemoglobin 14.3. Urinalysis has many bacteria and positive nitrites but only 13 WBCs. Temperature 97.2 with heart rate of 65 and blood pressure ranges 97/98289/94. O2 sat 96% on 3 L nasal cannula. Urine output is adequate. 3/25/2022  Patient seen and examined on [address]. Patient feels little tired and weak this morning but states he did not get much sleep last night. He states he feels otherwise pretty good. He denies any chest or abdominal pain. He denies any dizziness.   Echocardiogram shows EF 64% with stage I diastolic dysfunction. +1 TR with pulmonary hypertension at 50 mmHg. Essentially normal mitral and aortic valve. Temperature is 96.6 with heart rate 73 and blood pressure 123/76. O2 sat 93% on 3 L nasal cannula. Nursing has not done walking O2 sat on O2 nasal cannula yet. Past Medical History:    Past Medical History:   Diagnosis Date    COPD (chronic obstructive pulmonary disease) ([address]     Hypertension     Oxygen dependent     2 l doesnt use continuous     Past Surgical History:    Past Surgical History:   Procedure Laterality Date    HERNIA REPAIR      age 61   [place] SHOULDER ARTHROSCOPY Right 9/9/2020    RIGHT SHOULDER ARTHROSCOPY, SUBACROMIAL DECOMPRESSION,  DEBRIDEMENT LABRIUM AND ROTATOR CUFF, CHONDROPLASTY (ARTHREX) performed by Luis Kinney DO at [address]       Medications Prior to Admission:    @  Prior to Admission medications    Medication Sig Start Date End Date Taking?  Authorizing Provider   lisinopril-hydroCHLOROthiazide (PRINZIDE;ZESTORETIC) 20-12.5 MG per tablet Take 1 tablet by mouth daily 3/8/22   Abigail So DO   budesonide (PULMICORT) 0.5 MG/2ML nebulizer suspension inhale 1 vial via nebulizer twice daily 12/16/21   Historical Provider, MD   formoterol (PERFOROMIST) 20 MCG/2ML nebulizer solution inhale 1 unit dose via nebulizer twice daily 12/16/21   Historical Provider, MD   budesonide-formoterol [facility]) 160-4.5 MCG/ACT AERO  10/22/21   Historical Provider, MD   tiotropium (SPIRIVA RESPIMAT) 2.5 MCG/ACT AERS inhaler Take by mouth 10/22/21   Historical Provider, MD   lisinopril-hydroCHLOROthiazide (PRINZIDE;ZESTORETIC) 20-12.5 MG per tablet TAKE ONE TABLET BY MOUTH DAILY 11/12/21   Abigail So DO   omeprazole (PRILOSEC) 20 MG delayed release capsule Take 1 capsule by mouth every morning (before breakfast) 11/12/21   Abigail So DO   omeprazole (PRILOSEC) 20 MG delayed release capsule TAKE ONE CAPSULE BY MOUTH EVERY MORNING 5/27/21   Historical Provider, MD azelastine (ASTELIN) 0.1 % nasal spray 1-2 sprays by Nasal route 2 times daily as needed for Rhinitis Use in each nostril as directed 21   INDIGO Solo CNP   fluticasone [place]) 50 MCG/ACT nasal spray 2 sprays by Each Nostril route daily 20   INDIGO Solo CNP   OXYGEN Inhale 2 L into the lungs intermittent    Historical Provider, MD   Cyanocobalamin (VITAMIN B 12 PO) Take by mouth daily    Historical Provider, MD   Cholecalciferol (VITAMIN D3) 50 MCG ( [place]) TABS Take 2 tablets by mouth daily    Historical Provider, MD   aspirin 81 MG EC tablet Take 81 mg by mouth daily Last dose 2 weeks ago    Historical Provider, MD   albuterol (PROVENTIL) (2.5 MG/3ML) 0.083% nebulizer solution INHALE THE CONTENTS OF 1 VIAL (3 ML) VIA NEBULIZER 4 TIMES DAILY AS NEEDED WHEN COUGHING  WHEEZING  OR SHORT OF BREATH 20   Historical Provider, MD   albuterol sulfate  (90 Base) MCG/ACT inhaler Inhale 2 puffs into the lungs 4 times daily as needed for Wheezing 19   Lawson Opitz, DO       Allergies:  Penicillins    Social History:   Social History     Socioeconomic History    Marital status:      Spouse name: Not on file    Number of children: Not on file    Years of education: Not on file    Highest education level: Not on file   Occupational History    Not on file   Tobacco Use    Smoking status: Former Smoker     Packs/day: 1.00     Years: 60.00     Pack years: 60.00     Quit date: 2016     Years since quittin.3    Smokeless tobacco: Never Used   Vaping Use    Vaping Use: Never used   Substance and Sexual Activity    Alcohol use: No    Drug use: No    Sexual activity: Not on file   Other Topics Concern    Not on file   Social History Narrative    Not on file     Social Determinants of Health     Financial Resource Strain: Low Risk     Difficulty of Paying Living Expenses: Not hard at all   Food Insecurity: No Food Insecurity    Worried About Running Out of Food in the Last Year: Never true    Ran Out of Food in the Last Year: Never true   Transportation Needs: No Transportation Needs    Lack of Transportation (Medical): No    Lack of Transportation (Non-Medical): No   Physical Activity:     Days of Exercise per Week: Not on file    Minutes of Exercise per Session: Not on file   Stress:     Feeling of Stress : Not on file   Social Connections:     Frequency of Communication with Friends and Family: Not on file    Frequency of Social Gatherings with Friends and Family: Not on file    Attends [employer]ism Services: Not on file    Active Member of [address] [place] [address] or Organizations: Not on file    Attends Club or Organization Meetings: Not on file    Marital Status: Not on file   Intimate Partner Violence:     Fear of Current or Ex-Partner: Not on file    Emotionally Abused: Not on file    Physically Abused: Not on file    Sexually Abused: Not on file   Housing Stability:     Unable to Pay for Housing in the Last Year: Not on file    Number of [place] in the Last Year: Not on file    Unstable Housing in the Last Year: Not on file       Family History:   History reviewed. No pertinent family history. REVIEW OF SYSTEMS:    Gen: Patient denies any lightheadedness or dizziness. No LOC or syncope. No fevers or chills. HEENT: No earache, sore throat or nasal congestion. Resp: Denies cough, hemoptysis or sputum production. Cardiac: Denies chest pain, +SOB,no diaphoresis or palpitations. GI: No nausea, vomiting, diarrhea or constipation. No melena or hematochezia. : No urinary complaints, dysuria, hematuria or frequency. MSK: No extremity weakness, paralysis or paresthesias.      PHYSICAL EXAM:    Vitals:  /76   Pulse 73   Temp 96.6 °F (35.9 °C) (Infrared)   Resp 18   Ht 5' 10\" (1.778 m)   Wt 178 lb 12.8 oz (81.1 kg)   SpO2 93%   BMI 25.66 kg/m²     General:  This is a 79 y.o. yo male who is alert and oriented in mild respiratory distress  HEENT:  Head is normocephalic and atraumatic, PERRLA, EOMI, mucus membranes moist with no pharyngeal erythema or exudate. Neck:  Supple with no carotid bruits, JVD or thyromegaly.   No cervical adenopathy  CV:  Regular rate and rhythm, no murmurs  Lungs: Coarse decreased breath sounds to auscultation bilaterally with no wheezes, rales or rhonchi  Abdomen:  Soft, nontender, nondistended, bowel sounds present  Extremities:  No edema, peripheral pulses intact bilaterally  Neuro:  Cranial nerves II-XII grossly intact; motor and sensory function intact with no focal deficits  Skin:  No rashes, lesions or wounds    DATA:  CBC with Differential:    Lab Results   Component Value Date    WBC 4.6 03/24/2022    RBC 4.55 03/24/2022    HGB 14.3 03/24/2022    HCT 44.4 03/24/2022     03/24/2022    MCV 97.6 03/24/2022    MCH 31.4 03/24/2022    MCHC 32.2 03/24/2022    RDW 14.3 03/24/2022    LYMPHOPCT 7.0 03/24/2022    MONOPCT 3.0 03/24/2022    BASOPCT 0.0 03/24/2022    MONOSABS 0.14 03/24/2022    LYMPHSABS 0.32 03/24/2022    EOSABS 0.00 03/24/2022    BASOSABS 0.00 03/24/2022     CMP:    Lab Results   Component Value Date     03/24/2022    K 4.0 03/24/2022     03/24/2022    CO2 19 03/24/2022    BUN 33 03/24/2022    CREATININE 1.5 03/24/2022    GFRAA 54 03/24/2022    LABGLOM 45 03/24/2022    GLUCOSE 124 03/24/2022    PROT 6.5 03/24/2022    LABALBU 3.2 03/24/2022    CALCIUM 8.5 03/24/2022    BILITOT 0.4 03/24/2022    ALKPHOS 65 03/24/2022    AST 24 03/24/2022    ALT 18 03/24/2022     Magnesium:    Lab Results   Component Value Date    MG 2.0 03/24/2022     Phosphorus:  No results found for: PHOS  PT/INR:  No results found for: PROTIME, INR  Troponin:  No results found for: TROPONINI  U/A:    Lab Results   Component Value Date    COLORU DKYELLOW 03/24/2022    PROTEINU Negative 03/24/2022    PHUR 5.5 03/24/2022    WBCUA 1-3 03/24/2022    RBCUA NONE 03/24/2022    BACTERIA MANY 03/24/2022 CLARITYU Clear 03/24/2022    SPECGRAV >=1.030 03/24/2022    LEUKOCYTESUR Negative 03/24/2022    UROBILINOGEN 0.2 03/24/2022    BILIRUBINUR Negative 03/24/2022    BLOODU Negative 03/24/2022    GLUCOSEU Negative 03/24/2022     ABG:  No results found for: PH, PCO2, PO2, HCO3, BE, THGB, TCO2, O2SAT  HgBA1c:    Lab Results   Component Value Date    LABA1C 5.5 12/01/2021     FLP:    Lab Results   Component Value Date    TRIG 66 03/24/2022    HDL 45 03/24/2022    LDLCALC 110 03/24/2022    LABVLDL 13 03/24/2022     TSH:    Lab Results   Component Value Date    TSH 1.070 03/24/2022     IRON:  No results found for: IRON  LIPASE:  No results found for: LIPASE    ASSESSMENT AND PLAN:      Patient Active Problem List    Diagnosis Date Noted    [facility] 03/23/2022    COVID-19 03/23/2022    Chronic bronchitis ([facility] 04/20/2021    Shoulder impingement, right 08/17/2020    Nontraumatic complete tear of right rotator cuff 08/17/2020     Impression:  1. COVID-19 infection  2. Acute on chronic hypoxic respiratory failure  3. COPD with exacerbation  4. Hypertension    Plan:  Admit to monitored floor  Home medications reviewed  Monitor heart rate, blood pressure O2 saturation  Contact and droplet isolation  Consult pharmacy for COVID protocol  Decadron 6 mg IV push daily  Rocephin 1 g IV piggyback daily  Vibramycin 100 mg IV piggyback twice daily   Procalcitonin level  Urine for strep, antigen  Sputum culture sensitivity  Vitamin C 1 g p.o. daily, zinc 50 mg p.o. daily, vitamin B6 50 mg p.o. daily, vitamin B1 100 mg daily  Incentive spirometry every 1-2 hours while awake    Urine culture  Baricitinib 2 mg p.o. daily  O2 saturation on 3 L nasal cannula at rest and with activity while ambulating in the hallway and monitoring heart rate    Possible discharge home today    CMP, CBC, D-dimer in [email or web address] Colemanjefferson Vazquez DO, D.O.  3/25/2022  12:12 PM

## 2022-03-26 NOTE — PLAN OF CARE
Problem: Breathing Pattern - Ineffective  Goal: Ability to achieve and maintain a regular respiratory rate  Outcome: Met This Shift     Problem:  Body Temperature -  Risk of, Imbalanced  Goal: Ability to maintain a body temperature within defined limits  Outcome: Met This Shift  Goal: Will regain or maintain usual level of consciousness  Outcome: Met This Shift

## 2022-03-26 NOTE — PROGRESS NOTES
Department of Internal Medicine        CHIEF COMPLAINT: Shortness of breath    Reason for Admission: Acute COVID-19 pneumonia, hypoxic respiratory failure    HISTORY OF PRESENT ILLNESS:      The patient is a 79 y.o. male who presents with increased shortness of breath. Patient's wife is at the bedside. Patient normally is on 3 L nasal cannula at home with history of COPD. Patient had increased shortness of breath for the last 14 days with cough and congestion. Symptoms has progressively worsened. Patient's wife stated the patient has been in bed for the last 4 days. Patient has never been vaccinated or boosted before the COVID-19 infection. He has decreased appetite. He denies any fever/chills, nausea/vomiting, chest pain, abdominal pain, diarrhea or constipation. Patient's BUN/creatinine was 26/1.8 with normal liver enzymes and WBC 5.9 hemoglobin 14.9. D-dimer is 534. Rapid Covid test was positive. Temperature 97.6 with heart rate of 60 and blood pressure 101/61. O2 sat 98% on 4 L nasal cannula. Chest x-ray showed ill-defined patchy medial left upper lobe infiltrate and COPD.    3/24/2022  Patient seen examined on [facility]. Patient states he feels fairly good. Patient states he is not back to his baseline but he is doing better. He denies any chest or abdominal pain. There is no productive cough. BUN/creatinine is improved at 33/1.5. Liver enzymes are normal with a WBC 4.6 and hemoglobin 14.3. Urinalysis has many bacteria and positive nitrites but only 13 WBCs. Temperature 97.2 with heart rate of 65 and blood pressure ranges 12/64250/59. O2 sat 96% on 3 L nasal cannula. Urine output is adequate. 3/25/2022  Patient seen and examined on [facility]. Patient feels little tired and weak this morning but states he did not get much sleep last night. He states he feels otherwise pretty good. He denies any chest or abdominal pain. He denies any dizziness.   Echocardiogram shows EF 64% with stage I diastolic dysfunction. +1 TR with pulmonary hypertension at 50 mmHg. Essentially normal mitral and aortic valve. Temperature is 96.6 with heart rate 73 and blood pressure 123/76. O2 sat 93% on 3 L nasal cannula. Nursing has not done walking O2 sat on O2 nasal cannula yet. 3/26/2022  Patient seen examined on [facility]. Patient states he feels better today than yesterday. Patient was having more shortness of breath with activity yesterday. Patient still with nonproductive cough. Temperature 97.6 with heart rate of 66 and blood pressure ranges 103/54150/74. O2 sat 96% on 3 L nasal cannula. Patient ambulated in hallway yesterday with O2 saturation of 87% on 4 L and increased to 5 L and the O2 sat was 92% with activity. Case discussed with patient's wife at the bedside. Tentative discharge home tomorrow. Past Medical History:    Past Medical History:   Diagnosis Date    COPD (chronic obstructive pulmonary disease) ([address]     Hypertension     Oxygen dependent     2 l doesnt use continuous     Past Surgical History:    Past Surgical History:   Procedure Laterality Date    HERNIA REPAIR      age 61   [place] SHOULDER ARTHROSCOPY Right 9/9/2020    RIGHT SHOULDER ARTHROSCOPY, SUBACROMIAL DECOMPRESSION,  DEBRIDEMENT LABRIUM AND ROTATOR CUFF, CHONDROPLASTY (ARTHREX) performed by Tigre Crespo DO at [address]       Medications Prior to Admission:    @  Prior to Admission medications    Medication Sig Start Date End Date Taking?  Authorizing Provider   zinc sulfate (ZINCATE) 220 (50 Zn) MG capsule Take 1 capsule by mouth daily 3/26/22  Yes Reyna Moon DO   ascorbic acid (VITAMIN C) 1000 MG tablet Take 1 tablet by mouth daily 3/26/22  Yes Reyna Moon DO   dexamethasone (DECADRON) 6 MG tablet Take 1 tablet by mouth daily (with breakfast) for 6 days 3/26/22 4/1/22 Yes Reyna Moon DO   levoFLOXacin (LEVAQUIN) 250 MG tablet Take 1 tablet by mouth daily for 7 days 3/26/22 4/2/22 Yes Reyna Moon DO budesonide (PULMICORT) 0.5 MG/2ML nebulizer suspension inhale 1 vial via nebulizer twice daily 12/16/21   Historical Provider, MD   formoterol (PERFOROMIST) 20 MCG/2ML nebulizer solution inhale 1 unit dose via nebulizer twice daily 12/16/21   Historical Provider, MD   budesonide-formoterol [facility]) 160-4.5 MCG/ACT AERO  10/22/21   Historical Provider, MD   tiotropium (SPIRIVA RESPIMAT) 2.5 MCG/ACT AERS inhaler Take by mouth 10/22/21   Historical Provider, MD   lisinopril-hydroCHLOROthiazide (PRINZIDE;ZESTORETIC) 20-12.5 MG per tablet TAKE ONE TABLET BY MOUTH DAILY 11/12/21   Gisselle Morin DO   omeprazole (PRILOSEC) 20 MG delayed release capsule TAKE ONE CAPSULE BY MOUTH EVERY MORNING 5/27/21   Historical Provider, MD   azelastine (ASTELIN) 0.1 % nasal spray 1-2 sprays by Nasal route 2 times daily as needed for Rhinitis Use in each nostril as directed 6/21/21   INDIGO Omer CNP   fluticasone [facility]) 50 MCG/ACT nasal spray 2 sprays by Each Nostril route daily 12/21/20   INDIGO Omer CNP   OXYGEN Inhale 2 L into the lungs intermittent    Historical Provider, MD   Cyanocobalamin (VITAMIN B 12 PO) Take by mouth daily    Historical Provider, MD   Cholecalciferol (VITAMIN D3) 50 MCG (2000 UT) TABS Take 2 tablets by mouth daily    Historical Provider, MD   aspirin 81 MG EC tablet Take 81 mg by mouth daily Last dose 2 weeks ago    Historical Provider, MD   albuterol (PROVENTIL) (2.5 MG/3ML) 0.083% nebulizer solution INHALE THE CONTENTS OF 1 VIAL (3 ML) VIA NEBULIZER 4 TIMES DAILY AS NEEDED WHEN COUGHING  WHEEZING  OR SHORT OF BREATH 2/27/20   Historical Provider, MD   albuterol sulfate  (90 Base) MCG/ACT inhaler Inhale 2 puffs into the lungs 4 times daily as needed for Wheezing 12/24/19   Gisselle Morin DO       Allergies:  Penicillins    Social History:   Social History     Socioeconomic History    Marital status:      Spouse name: Not on file    Number of children: Not on file    Years of education: Not on file    Highest education level: Not on file   Occupational History    Not on file   Tobacco Use    Smoking status: Former Smoker     Packs/day: 1.00     Years: 60.00     Pack years: 60.00     Quit date: 2016     Years since quittin.3    Smokeless tobacco: Never Used   Vaping Use    Vaping Use: Never used   Substance and Sexual Activity    Alcohol use: No    Drug use: No    Sexual activity: Not on file   Other Topics Concern    Not on file   Social History Narrative    Not on file     Social Determinants of Health     Financial Resource Strain: Low Risk     Difficulty of Paying Living Expenses: Not hard at all   Food Insecurity: No Food Insecurity    Worried About [employer] [employer] in the Last Year: Never true    [address]  [employer] in the Last Year: Never true   Transportation Needs: No Transportation Needs    Lack of Transportation (Medical): No    Lack of Transportation (Non-Medical): No   Physical Activity:     Days of Exercise per Week: Not on file    Minutes of Exercise per Session: Not on file   Stress:     Feeling of Stress : Not on file   Social Connections:     Frequency of Communication with Friends and Family: Not on file    Frequency of Social Gatherings with Friends and Family: Not on file    Attends Tenri[employer] Services: Not on file    Active Member of [address] [place] [address] or Organizations: Not on file    Attends Club or Organization Meetings: Not on file    Marital Status: Not on file   Intimate Partner Violence:     Fear of Current or Ex-Partner: Not on file    Emotionally Abused: Not on file    Physically Abused: Not on file    Sexually Abused: Not on file   Housing Stability:     Unable to Pay for Housing in the Last Year: Not on file    Number of [place] in the Last Year: Not on file    Unstable Housing in the Last Year: Not on file       Family History:   History reviewed. No pertinent family history.     REVIEW OF SYSTEMS:    Gen: Patient denies any lightheadedness or dizziness. No LOC or syncope. No fevers or chills. HEENT: No earache, sore throat or nasal congestion. Resp: Denies cough, hemoptysis or sputum production. Cardiac: Denies chest pain, +SOB,no diaphoresis or palpitations. GI: No nausea, vomiting, diarrhea or constipation. No melena or hematochezia. : No urinary complaints, dysuria, hematuria or frequency. MSK: No extremity weakness, paralysis or paresthesias. PHYSICAL EXAM:    Vitals:  BP (!) 103/54   Pulse 66   Temp 97.6 °F (36.4 °C) (Oral)   Resp 19   Ht 5' 10\" (1.778 m)   Wt 178 lb 12.8 oz (81.1 kg)   SpO2 96%   BMI 25.66 kg/m²     General:  This is a 79 y.o. yo male who is alert and oriented in mild respiratory distress  HEENT:  Head is normocephalic and atraumatic, PERRLA, EOMI, mucus membranes moist with no pharyngeal erythema or exudate. Neck:  Supple with no carotid bruits, JVD or thyromegaly.   No cervical adenopathy  CV:  Regular rate and rhythm, no murmurs  Lungs: Coarse decreased breath sounds to auscultation bilaterally with no wheezes, rales or rhonchi  Abdomen:  Soft, nontender, nondistended, bowel sounds present  Extremities:  No edema, peripheral pulses intact bilaterally  Neuro:  Cranial nerves II-XII grossly intact; motor and sensory function intact with no focal deficits  Skin:  No rashes, lesions or wounds    DATA:  CBC with Differential:    Lab Results   Component Value Date    WBC 4.6 03/24/2022    RBC 4.55 03/24/2022    HGB 14.3 03/24/2022    HCT 44.4 03/24/2022     03/24/2022    MCV 97.6 03/24/2022    MCH 31.4 03/24/2022    MCHC 32.2 03/24/2022    RDW 14.3 03/24/2022    LYMPHOPCT 7.0 03/24/2022    MONOPCT 3.0 03/24/2022    BASOPCT 0.0 03/24/2022    MONOSABS 0.14 03/24/2022    LYMPHSABS 0.32 03/24/2022    EOSABS 0.00 03/24/2022    BASOSABS 0.00 03/24/2022     CMP:    Lab Results   Component Value Date     03/24/2022    K 4.0 03/24/2022     03/24/2022 CO2 19 03/24/2022    BUN 33 03/24/2022    CREATININE 1.5 03/24/2022    GFRAA 54 03/24/2022    LABGLOM 45 03/24/2022    GLUCOSE 124 03/24/2022    PROT 6.5 03/24/2022    LABALBU 3.2 03/24/2022    CALCIUM 8.5 03/24/2022    BILITOT 0.4 03/24/2022    ALKPHOS 65 03/24/2022    AST 24 03/24/2022    ALT 18 03/24/2022     Magnesium:    Lab Results   Component Value Date    MG 2.0 03/24/2022     Phosphorus:  No results found for: PHOS  PT/INR:  No results found for: PROTIME, INR  Troponin:  No results found for: TROPONINI  U/A:    Lab Results   Component Value Date    COLORU DKYELLOW 03/24/2022    PROTEINU Negative 03/24/2022    PHUR 5.5 03/24/2022    WBCUA 1-3 03/24/2022    RBCUA NONE 03/24/2022    BACTERIA MANY 03/24/2022    CLARITYU Clear 03/24/2022    SPECGRAV >=1.030 03/24/2022    LEUKOCYTESUR Negative 03/24/2022    UROBILINOGEN 0.2 03/24/2022    BILIRUBINUR Negative 03/24/2022    BLOODU Negative 03/24/2022    GLUCOSEU Negative 03/24/2022     ABG:  No results found for: PH, PCO2, PO2, HCO3, BE, THGB, TCO2, O2SAT  HgBA1c:    Lab Results   Component Value Date    LABA1C 5.5 12/01/2021     FLP:    Lab Results   Component Value Date    TRIG 66 03/24/2022    HDL 45 03/24/2022    LDLCALC 110 03/24/2022    LABVLDL 13 03/24/2022     TSH:    Lab Results   Component Value Date    TSH 1.070 03/24/2022     IRON:  No results found for: IRON  LIPASE:  No results found for: LIPASE    ASSESSMENT AND PLAN:      Patient Active Problem List    Diagnosis Date Noted    COPD exacerbation ([facility] 03/23/2022    COVID-19 03/23/2022    [facility] 04/20/2021    Shoulder impingement, right 08/17/2020    Nontraumatic complete tear of right rotator cuff 08/17/2020     Impression:  1. COVID-19 infection  2. Acute on chronic hypoxic respiratory failure-3 L nasal cannula at home  3. COPD with exacerbation  4. Hypertension  5. Acute kidney injury  6.   History of chronic kidney disease stage IIIa    Plan:  Admit to monitored floor  Home medications reviewed  Monitor heart rate, blood pressure O2 saturation  Contact and droplet isolation  Consult pharmacy for COVID protocol  Decadron 6 mg IV push daily  Rocephin 1 g IV piggyback daily  Vibramycin 100 mg IV piggyback twice daily   Procalcitonin level  Urine for strep, antigen  Sputum culture sensitivity  Vitamin C 1 g p.o. daily, zinc 50 mg p.o. daily, vitamin B6 50 mg p.o. daily, vitamin B1 100 mg daily  Incentive spirometry every 1-2 hours while awake    Urine culture  Baricitinib 2 mg p.o. daily  O2 saturation on 3 L nasal cannula at rest and with activity while ambulating in the hallway and monitoring heart rate    Possible discharge home tomorrow    CMP, CBC, D-dimer in a.m.     Pk Arellano DO, D.O.  3/26/2022  12:03 PM

## 2022-03-27 NOTE — DISCHARGE SUMMARY
Department of Internal Medicine        CHIEF COMPLAINT: Shortness of breath    Reason for Admission: Acute COVID-19 pneumonia, hypoxic respiratory failure    HISTORY OF PRESENT ILLNESS:      The patient is a 79 y.o. male who presents with increased shortness of breath. Patient's wife is at the bedside. Patient normally is on 3 L nasal cannula at home with history of COPD. Patient had increased shortness of breath for the last 14 days with cough and congestion. Symptoms has progressively worsened. Patient's wife stated the patient has been in bed for the last 4 days. Patient has never been vaccinated or boosted before the COVID-19 infection. He has decreased appetite. He denies any fever/chills, nausea/vomiting, chest pain, abdominal pain, diarrhea or constipation. Patient's BUN/creatinine was 26/1.8 with normal liver enzymes and WBC 5.9 hemoglobin 14.9. D-dimer is 534. Rapid Covid test was positive. Temperature 97.6 with heart rate of 60 and blood pressure 101/61. O2 sat 98% on 4 L nasal cannula. Chest x-ray showed ill-defined patchy medial left upper lobe infiltrate and COPD.    3/24/2022  Patient seen examined on [facility]. Patient states he feels fairly good. Patient states he is not back to his baseline but he is doing better. He denies any chest or abdominal pain. There is no productive cough. BUN/creatinine is improved at 33/1.5. Liver enzymes are normal with a WBC 4.6 and hemoglobin 14.3. Urinalysis has many bacteria and positive nitrites but only 13 WBCs. Temperature 97.2 with heart rate of 65 and blood pressure ranges 29/66894/64. O2 sat 96% on 3 L nasal cannula. Urine output is adequate. 3/25/2022  Patient seen and examined on [facility]. Patient feels little tired and weak this morning but states he did not get much sleep last night. He states he feels otherwise pretty good. He denies any chest or abdominal pain. He denies any dizziness.   Echocardiogram shows EF 64% with stage I diastolic dysfunction. +1 TR with pulmonary hypertension at 50 mmHg. Essentially normal mitral and aortic valve. Temperature is 96.6 with heart rate 73 and blood pressure 123/76. O2 sat 93% on 3 L nasal cannula. Nursing has not done walking O2 sat on O2 nasal cannula yet. 3/26/2022  Patient seen examined on [facility]. Patient states he feels better today than yesterday. Patient was having more shortness of breath with activity yesterday. Patient still with nonproductive cough. Temperature 97.6 with heart rate of 66 and blood pressure ranges 103/54150/74. O2 sat 96% on 3 L nasal cannula. Patient ambulated in hallway yesterday with O2 saturation of 87% on 4 L and increased to 5 L and the O2 sat was 92% with activity. Case discussed with patient's wife at the bedside. Tentative discharge home tomorrow. 3/27/2022  Patient seen examined on [facility]. Patient's wife at the bedside and case discussed. Patient state he feels a lot better except that he is weak from being in the hospital and with the infection. BUN/creatinine is 28/1.3 today with WBC 4.3 and mild ovation transaminase. D-dimer is improved at 377. Temperature is 97.5 with heart rate 76 and blood pressure 123/68. O2 sat 95% on 4 L nasal cannula. Baricitinib day 5. Patient says he is ready to go home. The patient's wife is inquiring about a walker. Patient states that he is fine to go home today without a walker his wife can help him move around.     Patient stable for discharge    Past Medical History:    Past Medical History:   Diagnosis Date    COPD (chronic obstructive pulmonary disease) ([address]     Hypertension     Oxygen dependent     2 l doesnt use continuous     Past Surgical History:    Past Surgical History:   Procedure Laterality Date    HERNIA REPAIR      age 61   [facility] SHOULDER ARTHROSCOPY Right 9/9/2020    RIGHT SHOULDER ARTHROSCOPY, SUBACROMIAL DECOMPRESSION,  DEBRIDEMENT LABRIUM AND ROTATOR CUFF, CHONDROPLASTY (ARTHREX) performed aspirin 81 MG EC tablet Take 81 mg by mouth daily Last dose 2 weeks ago    Historical Provider, MD   albuterol (PROVENTIL) (2.5 MG/3ML) 0.083% nebulizer solution INHALE THE CONTENTS OF 1 VIAL (3 ML) VIA NEBULIZER 4 TIMES DAILY AS NEEDED WHEN COUGHING  WHEEZING  OR SHORT OF BREATH 20   Historical Provider, MD   albuterol sulfate  (90 Base) MCG/ACT inhaler Inhale 2 puffs into the lungs 4 times daily as needed for Wheezing 19   Ephriam DO Marilyn       Allergies:  Penicillins    Social History:   Social History     Socioeconomic History    Marital status:      Spouse name: Not on file    Number of children: Not on file    Years of education: Not on file    Highest education level: Not on file   Occupational History    Not on file   Tobacco Use    Smoking status: Former Smoker     Packs/day: 1.00     Years: 60.00     Pack years: 60.00     Quit date: 2016     Years since quittin.3    Smokeless tobacco: Never Used   Vaping Use    Vaping Use: Never used   Substance and Sexual Activity    Alcohol use: No    Drug use: No    Sexual activity: Not on file   Other Topics Concern    Not on file   Social History Narrative    Not on file     Social Determinants of Health     Financial Resource Strain: Low Risk     Difficulty of Paying Living Expenses: Not hard at all   Food Insecurity: No Food Insecurity    Worried About Running Out of Food in the Last Year: Never true    [place] of Food in the Last Year: Never true   Transportation Needs: No Transportation Needs    Lack of Transportation (Medical): No    Lack of Transportation (Non-Medical):  No   Physical Activity:     Days of Exercise per Week: Not on file    Minutes of Exercise per Session: Not on file   Stress:     Feeling of Stress : Not on file   Social Connections:     Frequency of Communication with Friends and Family: Not on file    Frequency of Social Gatherings with Friends and Family: Not on file    Attends [employer] Services: Not on file    Active Member of Clubs or Organizations: Not on file    Attends Club or Organization Meetings: Not on file    Marital Status: Not on file   Intimate Partner Violence:     Fear of Current or Ex-Partner: Not on file    Emotionally Abused: Not on file    Physically Abused: Not on file    Sexually Abused: Not on file   Housing Stability:     Unable to Pay for Housing in the Last Year: Not on file    Number of [place] in the Last Year: Not on file    Unstable Housing in the Last Year: Not on file       Family History:   History reviewed. No pertinent family history. REVIEW OF SYSTEMS:    Gen: Patient denies any lightheadedness or dizziness. No LOC or syncope. No fevers or chills. HEENT: No earache, sore throat or nasal congestion. Resp: Denies cough, hemoptysis or sputum production. Cardiac: Denies chest pain, +SOB,no diaphoresis or palpitations. GI: No nausea, vomiting, diarrhea or constipation. No melena or hematochezia. : No urinary complaints, dysuria, hematuria or frequency. MSK: No extremity weakness, paralysis or paresthesias. PHYSICAL EXAM:    Vitals:  /68   Pulse 76   Temp 97.5 °F (36.4 °C) (Infrared)   Resp 17   Ht 5' 10\" (1.778 m)   Wt 178 lb 12.8 oz (81.1 kg)   SpO2 95%   BMI 25.66 kg/m²     General:  This is a 79 y.o. yo male who is alert and oriented in mild respiratory distress  HEENT:  Head is normocephalic and atraumatic, PERRLA, EOMI, mucus membranes moist with no pharyngeal erythema or exudate. Neck:  Supple with no carotid bruits, JVD or thyromegaly.   No cervical adenopathy  CV:  Regular rate and rhythm, no murmurs  Lungs: Coarse decreased breath sounds to auscultation bilaterally with no wheezes, rales or rhonchi  Abdomen:  Soft, nontender, nondistended, bowel sounds present  Extremities:  No edema, peripheral pulses intact bilaterally  Neuro:  Cranial nerves II-XII grossly intact; motor and sensory function intact with no focal deficits  Skin:  No rashes, lesions or wounds    DATA:  CBC with Differential:    Lab Results   Component Value Date    WBC 4.3 03/27/2022    RBC 4.66 03/27/2022    HGB 14.6 03/27/2022    HCT 44.7 03/27/2022     03/27/2022    MCV 95.9 03/27/2022    MCH 31.3 03/27/2022    MCHC 32.7 03/27/2022    RDW 14.4 03/27/2022    LYMPHOPCT 5.2 03/27/2022    MONOPCT 5.2 03/27/2022    BASOPCT 0.2 03/27/2022    MONOSABS 0.22 03/27/2022    LYMPHSABS 0.22 03/27/2022    EOSABS 0.00 03/27/2022    BASOSABS 0.00 03/27/2022     CMP:    Lab Results   Component Value Date     03/27/2022    K 4.1 03/27/2022     03/27/2022    CO2 22 03/27/2022    BUN 28 03/27/2022    CREATININE 1.3 03/27/2022    GFRAA >60 03/27/2022    LABGLOM 53 03/27/2022    GLUCOSE 98 03/27/2022    PROT 6.6 03/27/2022    LABALBU 3.3 03/27/2022    CALCIUM 8.6 03/27/2022    BILITOT 0.4 03/27/2022    ALKPHOS 63 03/27/2022    AST 54 03/27/2022    ALT 45 03/27/2022     Magnesium:    Lab Results   Component Value Date    MG 2.0 03/24/2022     Phosphorus:  No results found for: PHOS  PT/INR:  No results found for: PROTIME, INR  Troponin:  No results found for: TROPONINI  U/A:    Lab Results   Component Value Date    COLORU DKYELLOW 03/24/2022    PROTEINU Negative 03/24/2022    PHUR 5.5 03/24/2022    WBCUA 1-3 03/24/2022    RBCUA NONE 03/24/2022    BACTERIA MANY 03/24/2022    CLARITYU Clear 03/24/2022    SPECGRAV >=1.030 03/24/2022    LEUKOCYTESUR Negative 03/24/2022    UROBILINOGEN 0.2 03/24/2022    BILIRUBINUR Negative 03/24/2022    BLOODU Negative 03/24/2022    GLUCOSEU Negative 03/24/2022     ABG:  No results found for: PH, PCO2, PO2, HCO3, BE, THGB, TCO2, O2SAT  HgBA1c:    Lab Results   Component Value Date    LABA1C 5.5 12/01/2021     FLP:    Lab Results   Component Value Date    TRIG 66 03/24/2022    HDL 45 03/24/2022    LDLCALC 110 03/24/2022    LABVLDL 13 03/24/2022     TSH:    Lab Results   Component Value Date    TSH 1.070 03/24/2022     IRON:  No results found for: IRON  LIPASE:  No results found for: LIPASE    ASSESSMENT AND PLAN:      Patient Active Problem List    Diagnosis Date Noted    COPD exacerbation ([facility] 03/23/2022    COVID-19 03/23/2022    Chronic bronchitis ([facility] 04/20/2021    Shoulder impingement, right 08/17/2020    Nontraumatic complete tear of right rotator cuff 08/17/2020     Impression:  1. COVID-19 pneumonia  2. Acute on chronic hypoxic respiratory failure-3 L nasal cannula at home  3. COPD with exacerbation  4. Hypertension  5. Acute kidney injury  6.   History of chronic kidney disease stage IIIa    Plan:  Discharge home today    Prescription for Levaquin 250 mg p.o. daily starting tomorrow  Prescription for Decadron 6 mg p.o. daily x5 days    Over the counter vitamin C 1 g p.o. daily, zinc 50 mg p.o. daily,    Continue home meds    Follow-up with primary care physician early next week or as directed    Recommend repeat chest x-ray PA and lateral in 2-3 weeks or repeat CT of the chest without contrast      Eav Herrera DO, D.OKimberlyn  3/27/2022  11:21 AM

## 2022-03-27 NOTE — PLAN OF CARE
Problem: Airway Clearance - Ineffective  Goal: Achieve or maintain patent airway  3/27/2022 1328 by Susy Caceres RN  Outcome: Completed  3/27/2022 0617 by Ella Peña RN  Outcome: Met This Shift     Problem: Gas Exchange - Impaired  Goal: Absence of hypoxia  3/27/2022 1328 by Susy Caceres RN  Outcome: Completed  3/27/2022 0617 by Ella Peña RN  Outcome: Met This Shift  Goal: Promote optimal lung function  3/27/2022 1328 by Susy Caceres RN  Outcome: Completed  3/27/2022 0617 by Ella Peña RN  Outcome: Met This Shift     Problem: Breathing Pattern - Ineffective  Goal: Ability to achieve and maintain a regular respiratory rate  3/27/2022 1328 by Susy Caceres RN  Outcome: Completed  3/27/2022 0617 by Ella Peña RN  Outcome: Met This Shift     Problem:  Body Temperature -  Risk of, Imbalanced  Goal: Ability to maintain a body temperature within defined limits  3/27/2022 1328 by Susy Caceres RN  Outcome: Completed  3/27/2022 0617 by Ella Peña RN  Outcome: Met This Shift  Goal: Will regain or maintain usual level of consciousness  3/27/2022 1328 by Susy Caceres RN  Outcome: Completed  3/27/2022 0617 by Ella Peña RN  Outcome: Met This Shift  Goal: Complications related to the disease process, condition or treatment will be avoided or minimized  3/27/2022 1328 by Susy Caceres RN  Outcome: Completed  3/27/2022 0617 by Ella Peña RN  Outcome: Met This Shift     Problem: Isolation Precautions - Risk of Spread of Infection  Goal: Prevent transmission of infection  3/27/2022 1328 by Susy Caceres RN  Outcome: Completed  3/27/2022 0617 by Ella Peña RN  Outcome: Met This Shift     Problem: Nutrition Deficits  Goal: Optimize nutritional status  3/27/2022 1328 by Susy Caceres RN  Outcome: Completed  3/27/2022 0617 by Ella Peña RN  Outcome: Met This Shift     Problem: Risk for Fluid Volume Deficit  Goal: Maintain normal heart rhythm  3/27/2022 1328 by Starr Lanier ANTHONY Abrams  Outcome: Completed  3/27/2022 0617 by Stuart Guthrie RN  Outcome: Met This Shift  Goal: Maintain absence of muscle cramping  3/27/2022 1328 by Maryann Mcmillan RN  Outcome: Completed  3/27/2022 0617 by Stuart Guthrie RN  Outcome: Met This Shift  Goal: Maintain normal serum potassium, sodium, calcium, phosphorus, and pH  3/27/2022 1328 by Maryann Mcmillan RN  Outcome: Completed  3/27/2022 0617 by Stuart Guthrie RN  Outcome: Met This Shift     Problem: Loneliness or Risk for Loneliness  Goal: Demonstrate positive use of time alone when socialization is not possible  3/27/2022 1328 by Maryann Mcmillan RN  Outcome: Completed  3/27/2022 0617 by Stuart Guthrie RN  Outcome: Met This Shift     Problem: Fatigue  Goal: Verbalize increase energy and improved vitality  3/27/2022 1328 by Maryann Mcmillan RN  Outcome: Completed  3/27/2022 0617 by Stuart Guthrie RN  Outcome: Met This Shift     Problem: Patient Education: Go to Patient Education Activity  Goal: Patient/Family Education  3/27/2022 1328 by Maryann Mcmillan RN  Outcome: Completed  3/27/2022 0617 by Stuart Guthrie RN  Outcome: Met This Shift     Problem: Nutritional:  Goal: Ability to tolerate tube feedings without aspirating will improve  Description: Ability to tolerate tube feedings without aspirating will improve  3/27/2022 1328 by Maryann Mcmillan RN  Outcome: Completed  3/27/2022 0617 by Stuart Guthrie RN  Outcome: Met This Shift  Goal: Consumption of food in small portions  Description: Consumption of food in small portions  3/27/2022 1328 by Maryann Mcmillan RN  Outcome: Completed  3/27/2022 0617 by Stuart Guthrie RN  Outcome: Met This Shift  Goal: Consumption of liquid of appropriate consistency  Description: Consumption of liquid of appropriate consistency  3/27/2022 1328 by Maryann Mcmillan RN  Outcome: Completed  3/27/2022 0617 by Stuart Guthrie RN  Outcome: Met This Shift     Problem: Respiratory:  Goal: Ability to maintain a clear airway will improve  Description: Ability to maintain a clear airway will improve  3/27/2022 1328 by Erika Saunders RN  Outcome: Completed  3/27/2022 0617 by Alisha Rader RN  Outcome: Met This Shift  Goal: Will remain free from infection  Description: Will remain free from infection  3/27/2022 1328 by Erika Saunders RN  Outcome: Completed  3/27/2022 0617 by Alisha Rader RN  Outcome: Met This Shift  Goal: Absence of aspiration  Description: Absence of aspiration  3/27/2022 1328 by Erika Saunders RN  Outcome: Completed  3/27/2022 0617 by Alisha Rader RN  Outcome: Met This Shift     Problem: Safety:  Goal: Ability to chew and swallow food without choking will improve  Description: Ability to chew and swallow food without choking will improve  3/27/2022 1328 by Erika Saunders RN  Outcome: Completed  3/27/2022 0617 by Alisha Rader RN  Outcome: Met This Shift  Goal: Ability to demonstrate good, daily oral hygiene techniques will improve  Description: Ability to demonstrate good, daily oral hygiene techniques will improve  3/27/2022 1328 by Erika Saunders RN  Outcome: Completed  3/27/2022 0617 by Alisha Rader RN  Outcome: Met This Shift  Goal: Maintenance of upright position during and after feeding  Description: Maintenance of upright position during and after feeding  3/27/2022 1328 by Erika Saunders RN  Outcome: Completed  3/27/2022 0617 by Alisha Rader RN  Outcome: Met This Shift     Problem: Skin Integrity:  Goal: Will show no infection signs and symptoms  Description: Will show no infection signs and symptoms  3/27/2022 1328 by Erika Saunders RN  Outcome: Completed  3/27/2022 0617 by Alisha Rader RN  Outcome: Met This Shift  Goal: Absence of new skin breakdown  Description: Absence of new skin breakdown  3/27/2022 1328 by Erika Saunders RN  Outcome: Completed  3/27/2022 0617 by Alisha Rader RN  Outcome: Met This Shift     Problem: Falls - Risk of:  Goal: Will remain free from falls  Description: Will remain free from falls  3/27/2022 1328 by Bobbi Taylor RN  Outcome: Completed  3/27/2022 0617 by Kimi Antunez RN  Outcome: Met This Shift  Goal: Absence of physical injury  Description: Absence of physical injury  3/27/2022 1328 by Bobbi Taylor RN  Outcome: Completed  3/27/2022 0617 by Kimi Antunez RN  Outcome: Met This Shift

## 2022-03-27 NOTE — PROGRESS NOTES
Patient IV flushed but was painful and bruised. Refused to have new IV inserted. Patient for IV medication to be given later tonight. Doctor notified.  Electronically signed by Dorothy Noble RN on 3/26/2022 at 9:23 PM

## 2022-03-27 NOTE — PLAN OF CARE
Problem: Airway Clearance - Ineffective  Goal: Achieve or maintain patent airway  Outcome: Met This Shift     Problem: Gas Exchange - Impaired  Goal: Absence of hypoxia  Outcome: Met This Shift  Goal: Promote optimal lung function  Outcome: Met This Shift     Problem: Breathing Pattern - Ineffective  Goal: Ability to achieve and maintain a regular respiratory rate  3/27/2022 0617 by Percy Resendiz RN  Outcome: Met This Shift  3/26/2022 1824 by Silvio Jarrell RN  Outcome: Met This Shift     Problem:  Body Temperature -  Risk of, Imbalanced  Goal: Ability to maintain a body temperature within defined limits  3/27/2022 0617 by Percy Resendiz RN  Outcome: Met This Shift  3/26/2022 1824 by Silvio Jarrell RN  Outcome: Met This Shift  Goal: Will regain or maintain usual level of consciousness  3/27/2022 0617 by Percy Resendiz RN  Outcome: Met This Shift  3/26/2022 1824 by Silvio Jarrell RN  Outcome: Met This Shift  Goal: Complications related to the disease process, condition or treatment will be avoided or minimized  Outcome: Met This Shift     Problem: Isolation Precautions - Risk of Spread of Infection  Goal: Prevent transmission of infection  Outcome: Met This Shift     Problem: Nutrition Deficits  Goal: Optimize nutritional status  Outcome: Met This Shift     Problem: Risk for Fluid Volume Deficit  Goal: Maintain normal heart rhythm  Outcome: Met This Shift  Goal: Maintain absence of muscle cramping  Outcome: Met This Shift  Goal: Maintain normal serum potassium, sodium, calcium, phosphorus, and pH  Outcome: Met This Shift     Problem: Loneliness or Risk for Loneliness  Goal: Demonstrate positive use of time alone when socialization is not possible  Outcome: Met This Shift     Problem: Fatigue  Goal: Verbalize increase energy and improved vitality  Outcome: Met This Shift     Problem: Patient Education: Go to Patient Education Activity  Goal: Patient/Family Education  Outcome: Met This Shift     Problem: Nutritional:  Goal: Ability to tolerate tube feedings without aspirating will improve  Description: Ability to tolerate tube feedings without aspirating will improve  Outcome: Met This Shift  Goal: Consumption of food in small portions  Description: Consumption of food in small portions  Outcome: Met This Shift  Goal: Consumption of liquid of appropriate consistency  Description: Consumption of liquid of appropriate consistency  Outcome: Met This Shift     Problem: Respiratory:  Goal: Ability to maintain a clear airway will improve  Description: Ability to maintain a clear airway will improve  Outcome: Met This Shift  Goal: Will remain free from infection  Description: Will remain free from infection  Outcome: Met This Shift  Goal: Absence of aspiration  Description: Absence of aspiration  Outcome: Met This Shift     Problem: Safety:  Goal: Ability to chew and swallow food without choking will improve  Description: Ability to chew and swallow food without choking will improve  Outcome: Met This Shift  Goal: Ability to demonstrate good, daily oral hygiene techniques will improve  Description: Ability to demonstrate good, daily oral hygiene techniques will improve  Outcome: Met This Shift  Goal: Maintenance of upright position during and after feeding  Description: Maintenance of upright position during and after feeding  Outcome: Met This Shift     Problem: Skin Integrity:  Goal: Will show no infection signs and symptoms  Description: Will show no infection signs and symptoms  Outcome: Met This Shift  Goal: Absence of new skin breakdown  Description: Absence of new skin breakdown  Outcome: Met This Shift     Problem: Falls - Risk of:  Goal: Will remain free from falls  Description: Will remain free from falls  Outcome: Met This Shift  Goal: Absence of physical injury  Description: Absence of physical injury  Outcome: Met This Shift

## 2022-03-28 NOTE — CARE COORDINATION
Patient contacted regarding COVID-19 diagnosis. Discussed COVID-19 related testing which was available at this time. Test results were positive. Patient informed of results, if available? Patient is aware of positive diagnosis. Care Transition Nurse contacted the patient by telephone to perform post discharge assessment. Call within 2 business days of discharge: Yes. Verified name and  with patient's spouse, Arleth Bauer as identifiers. Provided introduction to self, and explanation of the CTN role, and reason for call due to risk factors for infection and/or exposure to COVID-19. CTN spoke with the patient's spouse, Arleth Bauer. Patient has permitted his spouse to speak on his behalf and patient is sharing on the call. Patient symptoms reviewed with Arleth Lizette who verbalized the following symptoms: fatigue  weakness  Cough; reports expectorating small amounts of clear mucus  shortness of breath  decreased appetite    Arleth Pollardcy reports patient is utilizing oxygen @ 4 LNC; SpO2 92%. Due to no new or worsening symptoms encounter was not routed to provider for escalation. Discussed follow-up appointments. If no appointment was previously scheduled, appointment scheduling offered: CTN spoke with Karson Ba at Dr. Bharat Renee office and confirmed patient has a hospital follow up/TCM visit scheduled on 2022. [facility] follow up appointment(s):   Future Appointments   Date Time Provider Chio Bass   2022  9:00 AM [facility] CARDIO ECHO ROOM Noris Pierce [facility] ECHO [facility]   2022 11:00 AM DO CIARA Noguera [facility]   2022 10:45 AM INDIGO Arellano CNP [facility]         Non-face-to-face services provided:  Obtained and reviewed discharge summary and/or continuity of care documents     Advance Care Planning:   Does patient have an Advance Directive:  decision maker updated. Educated patient about risk for severe COVID-19 due to risk factors according to CDC guidelines.  CTN reviewed discharge instructions, medical action plan and red flag symptoms with the Arun Barker who verbalized understanding. Discussed COVID vaccination status: Unvaccinated. Education provided on COVID-19 vaccination as appropriate. Discussed exposure protocols and quarantine with CDC Guidelines. Arun Barker was given an opportunity to verbalize any questions and concerns and agrees to contact CTN or health care provider for questions related to their healthcare. Reviewed and educated Patrick on any new and changed medications related to discharge diagnosis   -Arun Barker agrees to follow up call from a [place] [id number] Clinical Pharmacist for further reconciliation of patient's medications. Was patient discharged with a pulse oximeter? No  Discussed when to notify provider or seek emergency care. CTN provided contact information. Plan for follow-up call in 5-7 days based on severity of symptoms and risk factors. Cait Wilkes

## 2022-03-29 NOTE — TELEPHONE ENCOUNTER
----- Message from Ivan Nelson RN sent at 3/28/2022 12:03 PM EDT -----  Regarding: Referral    Hospital DC 3/27/2022. DX:  COPD Exacerbation; COVID-19   Patient has f/u appointment with PCP on 4/8/2022. Patient's [facility] further review of patient's medications. Patient's spouse assists with medication management for the patient. There are some medications listed as not taking that need to be reviewed/removed from the list.   [id number] will need dropped. Thank you!

## 2022-03-29 NOTE — TELEPHONE ENCOUNTER
Problem: Patient Care Overview (Adult)  Goal: Plan of Care Review  Outcome: Outcome(s) achieved Date Met:  08/07/17 08/07/17 1405   Coping/Psychosocial Response Interventions   Plan Of Care Reviewed With patient;spouse   Patient Care Overview   Progress improving   Outcome Evaluation   Outcome Summary/Follow up Plan ready for discharge       Goal: Adult Individualization and Mutuality  Outcome: Outcome(s) achieved Date Met:  08/07/17  Goal: Discharge Needs Assessment  Outcome: Outcome(s) achieved Date Met:  08/07/17    Problem: Perioperative Period (Pediatric)  Goal: Signs and Symptoms of Listed Potential Problems Will be Absent or Manageable (Perioperative Period)  Outcome: Outcome(s) achieved Date Met:  08/07/17 08/07/17 1405   Perioperative Period   Problems Assessed (Perioperative Period) pain;wound complications;hemorrhage;hypoxia/hypoxemia;perioperative injury;infection;physiologic stress response;situational response   Problems Present (Perioperative Period) pain            Wife returned call and scheduled for tomorrow at [address] in place:  No   Recommendation Provided To: Patient/Caregiver: 1 via Telephone   Intervention Detail: Scheduled Appointment   Gap Closed?: Yes    Intervention Accepted By: Patient/Caregiver: 1   Time Spent (min): 15

## 2022-03-29 NOTE — TELEPHONE ENCOUNTER
Received a referral:  from Care Coordinator to review patients medications. [id number] will need dropped    Called spouse, Elaine Gomez to schedule a time to speak with a pharmacist over the telephone. No answer left VM: Please contact us at  [phone] to schedule this appointment. Pharmacists are available Monday thru Friday 7:30 AM till 5:30 PM.    Allie Watt CP.    [address] [facility] [facility] free: [phone]

## 2022-03-30 NOTE — TELEPHONE ENCOUNTER
[facility] CLINICAL PHARMACY REVIEW: Post-Discharge Transitions of Care (AGUSTINA)  Subjective/Objective:  Darrin Mg is a 79 y.o. male. Patient was discharged from [address] [place] [address] on 3/27/22 with a diagnosis of COPD Exacerbation. Patient outreach to review discharge medications and provide medication review and management. Spoke with wife. Allergies   Allergen Reactions    Penicillins Hives     Long ago       Discharge Medications (as per discharging medication list found in AVS): There are NEW medications for you. START taking them after you leave the hospital:  Medication Sig Comments    zinc sulfate (ZINCATE) 220 (50 Zn) MG capsule Take 1 capsule by mouth daily     ascorbic acid (VITAMIN C) 1000 MG tablet Take 1 tablet by mouth daily     dexamethasone (DECADRON) 6 MG tablet Take 1 tablet by mouth daily (with breakfast) for 6 days     levoFLOXacin (LEVAQUIN) 250 MG tablet Take 1 tablet by mouth daily for 7 days      You told us you were taking these medications at home, but the amount or how often you take this medication has CHANGED:  Losartan/HCTZ and Omeprazole not actually changed. Has been taking regularly.     These are medications you told us you were taking at home, CONTINUE taking them after you leave the hospital:  Medication Sig Comments    budesonide (PULMICORT) 0.5 MG/2ML nebulizer suspension inhale 1 vial via nebulizer twice daily - Taking as prescribed    formoterol (PERFOROMIST) 20 MCG/2ML nebulizer solution inhale 1 unit dose via nebulizer twice daily - Taking as prescribed      lisinopril-hydroCHLOROthiazide (PRINZIDE;ZESTORETIC) 20-12.5 MG per tablet TAKE ONE TABLET BY MOUTH DAILY - Taking as prescribed    omeprazole (PRILOSEC) 20 MG delayed release capsule TAKE ONE CAPSULE BY MOUTH EVERY MORNING - Taking as prescribed     fluticasone (FLONASE) 50 MCG/ACT nasal spray 2 sprays by Each Nostril route daily - Taking as prescribed    OXYGEN Inhale 2 L into the lungs intermittent - Taking as prescribed    Cyanocobalamin (VITAMIN B 12 PO) Take by mouth daily - Taking as prescribed    Cholecalciferol (VITAMIN D3) 50 MCG (2000 UT) TABS Take 2 tablets by mouth daily - Taking as prescribed    aspirin 81 MG EC tablet Take 81 mg by mouth daily Last dose 2 weeks ago (Patient not taking: Reported on 3/28/2022) - No longer taking- Removed from med list    albuterol (PROVENTIL) (2.5 MG/3ML) 0.083% nebulizer solution INHALE THE CONTENTS OF 1 VIAL (3 ML) VIA NEBULIZER 4 TIMES DAILY AS NEEDED WHEN COUGHING  WHEEZING  OR SHORT OF BREATH - Taking as prescribed    albuterol sulfate  (90 Base) MCG/ACT inhaler Inhale 2 puffs into the lungs 4 times daily as needed for Wheezing - Taking as prescribed     These are the medications you have told us you were taking at home, STOP taking them after you leave the hospital:   none    Additional Medications:   Duonebs- using q6h prn. Added to med list    Estimated Creatinine Clearance: 45 mL/min (A) (based on SCr of 1.3 mg/dL (H)). Assessment/Plan:  - Medication reconciliation completed. Patient has filled new medications ordered after this hospital discharge and is taking medications as directed by discharging provider. - Instructions per discharge list provided except per below documentation. Identified medication discrepancies/issues:   · none  · Medication list updated as appropriate/noted    - Identified Potential Medication Interactions: No clinically significant interactions identified via [employer] Interaction Analysis as category D or higher.    - Renal Dosing: No renal adjustments necessary.    - Follow up appointment(s):  · Reminded of upcoming appointment(s)  Future Appointments   Date Time Provider Chio Bass   4/8/2022 12:00 PM DO CIARA Marshall [facility]   5/30/2022 11:00 AM DO CIARA Marshall [facility]   6/21/2022 10:45 AM INDIGO Klein - CNP [facility]       SANDRA Yeung PharmD, [address] [facility], toll free: [phone]    For Pharmacy Admin Tracking Only     Gap Closed?: Yes    Time Spent (min): 30

## 2022-03-31 PROBLEM — J96.01 ACUTE RESPIRATORY FAILURE WITH HYPOXIA (HCC): Status: ACTIVE | Noted: 2022-01-01

## 2022-03-31 NOTE — PROGRESS NOTES
Call out to  for page to respiratory per heated high flow nasal cannula. Electronically signed by Mayra Wilson RN on 3/31/2022 at 1:27 PM

## 2022-03-31 NOTE — ED NOTES
Dr. Alisha Bergman informed of patient failing to maintain oyxgen saturation, pt. placed on Bipap per his instructions.      Srinath Brown RN  03/31/22 1016

## 2022-03-31 NOTE — CONSULTS
[facility]  Consult Note    [address] [facility] [address]  [place], [address]  Phone [phone]   [phone]      Admit Date: 3/31/2022  1:30 AM  Pt Name: Mike Ibrahim  MRN: [id number]  : 1940  Reason for Consult:    Chief Complaint   Patient presents with    Positive For Covid-19     Tested positive last Wednesday    Shortness of Breath     Starting tonight     Requesting Physician:  Fahad Garcia DO  PCP: Ramiro Voss DO  History Obtained From:  patient, chart   ID consulted for Cavitary lesion of lung [J98.4]  Acute respiratory failure with hypoxia ([facility] [address] [J96.01]  Chronic renal impairment, unspecified CKD stage [N18.9]  on hospital day 0  CHIEF COMPLAINT       Chief Complaint   Patient presents with    Positive For Covid-19     Tested positive last Wednesday    Shortness of Breath     Starting tonight     HISTORYOF PRESENT ILLNESS   Mike Ibrahim is a 79 y.o. male who presents with   has a past medical history of COPD (chronic obstructive pulmonary disease) ([facility] [address] Hypertension, and Oxygen dependent. ED TRIAGEVITALS  BP: 108/63, Temp: 98 °F (36.7 °C), Pulse: 79, Resp: 20, SpO2: 93 %  HPI  Pt from home with SOB  Pt was dx with COVID 3/23 unvaccinated  d/c with levaquin received barcitinib    Pt presented with sob/some cough  He denies f/c/n/vd/rash/itch/chest pain/gi or gu issues  Afebrile  Wbc8.8 cr1.9  bipap   CtA neg PE No evidence of pulmonary embolism. Left apical cavitary lesion, new since the prior examination.  Differential   includes infection/cavitating pneumonia including tuberculosis, particularly   given apical location.  Underlying malignancy is not excluded.  Clinical   correlation recommended.       lives with wife  Has cats  previous smoker retired  No travel outside [address]  was in [facility]   no tb exposure     REVIEW OF SYSTEMS     CONSTITUTIONAL:   No fever, chills, weight loss  ALLERGIES:    No urticaria, hay fever,    EYES:     No blurry vision, loss of vision, eye pain  ENT:      No hearing loss, sore throat  CARDIOVASCULAR:  No chest pain or palpitations  RESPIRATORY:     cough, sob  ENDOCRINE:    No increase thirst, urination   HEME-LYMPH:   No easy bruising or bleeding  GI:     No nausea, vomiting or diarrhea  :     No urinary complaints  NEURO:    No seizures, stroke, HA  MUSCULOSKELETAL:  No muscle aches or pain, no joint pain  SKIN:     No rash or itch  PSYCH:    No depression or anxiety    Medications Prior to Admission: ipratropium-albuterol (DUONEB) 0.5-2.5 (3) MG/3ML SOLN nebulizer solution,   zinc sulfate (ZINCATE) 220 (50 Zn) MG capsule, Take 1 capsule by mouth daily (Patient taking differently: Take 100 mg by mouth daily )  ascorbic acid (VITAMIN C) 1000 MG tablet, Take 1 tablet by mouth daily  dexamethasone (DECADRON) 6 MG tablet, Take 1 tablet by mouth daily (with breakfast) for 6 days  levoFLOXacin (LEVAQUIN) 250 MG tablet, Take 1 tablet by mouth daily for 7 days  budesonide (PULMICORT) 0.5 MG/2ML nebulizer suspension, inhale 1 vial via nebulizer twice daily  formoterol (PERFOROMIST) 20 MCG/2ML nebulizer solution, inhale 1 unit dose via nebulizer twice daily  lisinopril-hydroCHLOROthiazide (PRINZIDE;ZESTORETIC) 20-12.5 MG per tablet, TAKE ONE TABLET BY MOUTH DAILY  omeprazole (PRILOSEC) 20 MG delayed release capsule, TAKE ONE CAPSULE BY MOUTH EVERY MORNING  fluticasone (FLONASE) 50 MCG/ACT nasal spray, 2 sprays by Each Nostril route daily  OXYGEN, Inhale 2 L into the lungs intermittent  Cyanocobalamin (VITAMIN B 12 PO), Take 500 mcg by mouth daily   Cholecalciferol (VITAMIN D3) 50 MCG (2000 UT) TABS, Take 2 tablets by mouth daily  albuterol (PROVENTIL) (2.5 MG/3ML) 0.083% nebulizer solution, INHALE THE CONTENTS OF 1 VIAL (3 ML) VIA NEBULIZER 4 TIMES DAILY AS NEEDED WHEN COUGHING  WHEEZING  OR SHORT OF BREATH  albuterol sulfate  (90 Base) MCG/ACT inhaler, Inhale 2 puffs into the lungs 4 times daily as needed for Wheezing  CURRENT MEDICATIONS     Current Facility-Administered Medications:     anidulafungin (ERAXIS) 200 mg in dextrose 5 % 260 mL IVPB, 200 mg, IntraVENous, Once **AND** [START ON 2022] anidulafungin (ERAXIS) 100 mg in dextrose 5 % 130 mL IVPB, 100 mg, IntraVENous, Q24H, Radha Moya MD    meropenem (MERREM) 1,000 mg in sodium chloride 0.9 % 100 mL IVPB (mini-bag), 1,000 mg, IntraVENous, Q12H, Radha Moya MD    0.9 % sodium chloride infusion, 25 mL, IntraVENous, Continuous, Radha Moya MD  ALLERGIES     Penicillins    There is no immunization history on file for this patient. Internal Administration   First Dose      Second Dose           Last COVID Lab SARS-CoV-2 (no units)   Date Value   2020 Not Detected     SARS-CoV-2, NAAT (no units)   Date Value   2022 DETECTED (A)            PAST MEDICAL HISTORY     Past Medical History:   Diagnosis Date    COPD (chronic obstructive pulmonary disease) ([facility] [address]     Hypertension     Oxygen dependent     2 l doesnt use continuous     SURGICAL HISTORY       Past Surgical History:   Procedure Laterality Date    HERNIA REPAIR      age 61   [place] SHOULDER ARTHROSCOPY Right 2020    RIGHT SHOULDER ARTHROSCOPY, SUBACROMIAL DECOMPRESSION,  DEBRIDEMENT LABRIUM AND ROTATOR CUFF, CHONDROPLASTY (ARTHREX) performed by Brianna Fry DO at [address] [place] [address]     History reviewed. No pertinent family history. SOCIAL HISTORY       Social History     Socioeconomic History    Marital status:      Spouse name: None    Number of children: None    Years of education: None    Highest education level: None   Occupational History    None   Tobacco Use    Smoking status: Former Smoker     Packs/day: 1.00     Years: 60.00     Pack years: 60.00     Quit date: 2016     Years since quittin.3    Smokeless tobacco: Never Used   Vaping Use    Vaping Use: Never used   Substance and Sexual Activity    Alcohol use:  No  Drug use: No    Sexual activity: None   Other Topics Concern    None   Social History Narrative    None     Social Determinants of Health     Financial Resource Strain: Low Risk     Difficulty of Paying Living Expenses: Not hard at all   Food Insecurity: No Food Insecurity    Worried About Running Out of Food in the Last Year: Never true    Joselin of Food in the Last Year: Never true   Transportation Needs: No Transportation Needs    Lack of Transportation (Medical): No    Lack of Transportation (Non-Medical):  No   Physical Activity:     Days of Exercise per Week: Not on file    Minutes of Exercise per Session: Not on file   Stress:     Feeling of Stress : Not on file   Social Connections:     Frequency of Communication with Friends and Family: Not on file    Frequency of Social Gatherings with Friends and Family: Not on file    Attends Yarsani Services: Not on file    Active Member of [address] [place] [address] RV ID or Organizations: Not on file    Attends Club or Organization Meetings: Not on file    Marital Status: Not on file   Intimate Partner Violence:     Fear of Current or Ex-Partner: Not on file    Emotionally Abused: Not on file    Physically Abused: Not on file    Sexually Abused: Not on file   Housing Stability:     Unable to Pay for Housing in the Last Year: Not on file    Number of [place] in the Last Year: Not on file    Unstable Housing in the Last Year: Not on file     ·      [address]       ED Triage Vitals [03/31/22 0135]   BP Temp Temp Source Pulse Resp SpO2 Height Weight   115/70 98.6 °F (37 °C) Oral 98 28 95 % 5' 10.5\" (1.791 m) 180 lb (81.6 kg)     Vitals:    Vitals:    03/31/22 0459 03/31/22 0557 03/31/22 0645 03/31/22 0818   BP:  101/74 (!) 96/52 108/63   Pulse: 80 83 80 79   Resp:  24 23 20   Temp:  97.8 °F (36.6 °C) 98 °F (36.7 °C) 98 °F (36.7 °C)   TempSrc:  Oral Infrared Infrared   SpO2: 100% 100% 90% 93%   Weight:   171 lb 1.2 oz (77.6 kg)    Height:   5' 10.5\" (1.791 m) Physical Exam   Constitutional/General: Alert and oriented, NAD thin   Head: NC/AT  Eyes: PERRL, EOMI  Mouth: Normal mucosa, no thrush   Neck: Supple, full ROM,    Pulmonary: Lungs dec  to auscultation bilaterally. Not in respiratory distress  Cardiovascular:  Regular rate and rhythm, no murmurs, gallops, or rubs. Abdomen: Soft, + BS. No distension. Nontender. Extremities: Moves all extremities x 4. Warm and well perfused  Pulses:  Distal pulses intact  Skin: Warm and dry without rash  Neurologic:    No focal deficits  Psych: Normal Affect     DIAGNOSTIC RESULTS   RADIOLOGY:   XR CHEST PORTABLE    Result Date: 3/23/2022  EXAMINATION: ONE XRAY VIEW OF THE CHEST 3/23/2022 9:39 am COMPARISON: The previous study performed 03/08/2021. HISTORY: ORDERING SYSTEM PROVIDED HISTORY: sob TECHNOLOGIST PROVIDED HISTORY: Reason for exam:->sob FINDINGS: There is an ill-defined, patchy infiltrate involving the medial left upper lobe. The remainder of the lungs are clear. COPD is again noted. No active pleural disease is appreciated. The cardiac silhouette and mediastinal structures are without significant interval change. Ill-defined, patchy medial left upper lobe infiltrate. COPD again identified, when compared to the previous study performed 03/08/2021. CTA CHEST W CONTRAST    Result Date: 3/31/2022  EXAMINATION: CTA OF THE CHEST 3/31/2022 3:29 am TECHNIQUE: CTA of the chest was performed after the administration of intravenous contrast.  Multiplanar reformatted images are provided for review. MIP images are provided for review. Dose modulation, iterative reconstruction, and/or weight based adjustment of the mA/kV was utilized to reduce the radiation dose to as low as reasonably achievable. COMPARISON: None.  HISTORY: ORDERING SYSTEM PROVIDED HISTORY: shortness of breath, r/o PE TECHNOLOGIST PROVIDED HISTORY: Reason for exam:->shortness of breath, r/o PE Decision Support Exception - unselect if not a Component Value Date    BC 5 Days no growth 03/23/2022     Lab Results   Component Value Date    BLOODCULT2 5 Days no growth 03/23/2022        FINAL IMPRESSION    Patient is a 79 y.o. male who presented with   Chief Complaint   Patient presents with    Positive For Covid-19     Tested positive last Wednesday    Shortness of Breath     Starting tonight    and admitted for Cavitary lesion of lung [J98.4]  Acute respiratory failure with hypoxia (HCC) [J96.01]  Chronic renal impairment, unspecified CKD stage [N18.9]        Recent  covid unvaccinated  Came in with hypoxia 15l  -s/p baricitinib       anidulafungin (ERAXIS) 200 mg in dextrose 5 % 260 mL IVPB, Once   And  [START ON 4/1/2022] anidulafungin (ERAXIS) 100 mg in dextrose 5 % 130 mL IVPB, Q24H  meropenem (MERREM) 1,000 mg in sodium chloride 0.9 % 100 mL IVPB (mini-bag), Q12H  0.9 % sodium chloride infusion, Continuous      Orders Placed This Encounter   Procedures    Culture, Blood 1    Culture, Blood 2    Culture, MRSA, Screening    Respiratory Panel, Molecular, with COVID-19 (Restricted: peds pts or suitable admitted adults)    Lactate, Sepsis    Procalcitonin    T-Spot TB Test    Miscellaneous Sendout    MISCELLANEOUS SENDOUT 2    Lactate Dehydrogenase    C-Reactive Protein    Ferritin    Fibrinogen    D-Dimer, Quantitative    Protime-INR    Sedimentation Rate    Lactic Acid    CK    Troponin       Imaging and labs were reviewed per medical records and any ID pertinent labs were addressed with the patient. The patient/FAMILY  was educated about the diagnosis, prognosis, indications, risks and benefits of treatment. An opportunity to ask questions was given to the patient/FAMILY and questions were answered. Thank you for involving me in the care of Lakesha Liang. Please do not hesitate to call for any questions or concerns.          Electronically signed by Saintclair Passe, MD on 3/31/2022 at 9:18 AM

## 2022-03-31 NOTE — PROGRESS NOTES
Pulmonary/Critical Care Progress Note    We are following patient for left upper lobe pneumonia, history of COVID-19 (unvaccinated), COPD with exacerbation    SUBJECTIVE:  Patient is an 58-year-old female who was recently discharged from this hospital on 3/27 after a 4-day admission for COPD exacerbation and pneumonia. After the patient arrived home, he was having difficulty getting out of bed and he came back to the hospital because of profound weakness. The patient normally uses 4 L oxygen continuously at home because of severe COPD (60-pack-year smoking history stopped several years ago), but increased it to 6 L at home. However, the patient became more short of breath and came to the hospital for further treatment and evaluation. CT scan of the chest showed what I believe is an infected bleb which takes the shape of a cavitary lesion. There are no previous CT scans with which to compare this and it is very difficult to compare chest x-ray with a CAT scan. The patient has been started on numerous antimicrobial medications by the infectious disease service including Eraxis, meropenem, and received a previous dose of vancomycin. A previous echocardiogram on 3/24/2002 was relatively unremarkable. It should be noted that the CT scan of the chest shows extensive bullous disease in the apices which I believe is the structure which is in fact infected in which appears masslike but is much more likely to be inflammatory at this time. Follow-up of course will be needed.     MEDICATIONS:   [START ON 4/1/2022] anidulafungin  100 mg IntraVENous Q24H    meropenem  1,000 mg IntraVENous Q12H    vitamin B-6  50 mg Oral Daily    thiamine mononitrate  100 mg Oral Daily    vitamin E  400 Units Oral Daily    melatonin  5 mg Oral Nightly    Vitamin D  4,000 Units Oral Daily    fluticasone  2 spray Each Nostril Daily    [START ON 4/1/2022] pantoprazole  40 mg Oral QAM AC    zinc sulfate  50 mg Oral Daily    ascorbic acid  1,000 mg Oral Daily    enoxaparin  30 mg SubCUTAneous Daily    budesonide-formoterol  2 puff Inhalation BID    dexamethasone  6 mg IntraVENous Q8H    ipratropium-albuterol  1 ampule Inhalation Q4H      sodium chloride Stopped (03/31/22 1436)    0.9% NaCl with KCl 20 mEq 75 mL/hr at 03/31/22 1451     prochlorperazine, acetaminophen      REVIEW OF SYSTEMS:  Constitutional: Denies fever, weight loss, night sweats,  but positive for fatigue  Skin: Denies pigmentation, dark lesions, and rashes   HEENT: Denies hearing loss, tinnitus, ear drainage, epistaxis, sore throat, and hoarseness. Cardiovascular: Denies palpitations, chest pain, and chest pressure. Respiratory: Denies cough, dyspnea at rest, hemoptysis, apnea, and choking. Gastrointestinal: Denies nausea, vomiting, poor appetite, diarrhea, heartburn or reflux  Genitourinary: Denies dysuria, frequency, urgency or hematuria  Musculoskeletal: Denies myalgias, muscle weakness, and bone pain  Neurological: Denies dizziness, vertigo, headache, and focal weakness  Psychological: Denies anxiety and depression  Endocrine: Denies heat intolerance and cold intolerance  Hematopoietic/Lymphatic: Denies bleeding problems and blood transfusions    OBJECTIVE:  Vitals:    03/31/22 1456   BP: 119/62   Pulse: 75   Resp: 20   Temp: 97.6 °F (36.4 °C)   SpO2: 94%     FiO2 : 100 %  O2 Flow Rate (L/min): 60 L/min (95%)  O2 Device: Heated high flow cannula    PHYSICAL EXAM:  Constitutional: No fever, chills, diaphoresis  Skin: No skin rash, no skin breakdown  HEENT: Unremarkable  Neck: No JVD, lymphadenopathy, thyromegaly  Cardiovascular: S1, S2 regular. No S3 murmurs rubs present  Respiratory: Faint end expiratory wheeze bilaterally. Diminished breath sounds on both sides posteriorly.   No pleural rubs are present  Gastrointestinal: Soft, flat, nontender  Genitourinary: No CVA tenderness  Extremities: No clubbing, cyanosis, or edema  Neurological: Awake, alert, oriented x3. No evidence of focal motor or sensory deficits  Psychologic.   Anxious and mildly depressed affect    LABS:  WBC   Date Value Ref Range Status   03/31/2022 6.8 4.5 - 11.5 E9/L Final   03/27/2022 4.3 (L) 4.5 - 11.5 E9/L Final   03/24/2022 4.6 4.5 - 11.5 E9/L Final     Hemoglobin   Date Value Ref Range Status   03/31/2022 16.3 12.5 - 16.5 g/dL Final   03/27/2022 14.6 12.5 - 16.5 g/dL Final   03/24/2022 14.3 12.5 - 16.5 g/dL Final     Hematocrit   Date Value Ref Range Status   03/31/2022 50.2 37.0 - 54.0 % Final   03/27/2022 44.7 37.0 - 54.0 % Final   03/24/2022 44.4 37.0 - 54.0 % Final     MCV   Date Value Ref Range Status   03/31/2022 95.6 80.0 - 99.9 fL Final   03/27/2022 95.9 80.0 - 99.9 fL Final   03/24/2022 97.6 80.0 - 99.9 fL Final     Platelets   Date Value Ref Range Status   03/31/2022 333 130 - 450 E9/L Final   03/27/2022 138 130 - 450 E9/L Final   03/24/2022 124 (L) 130 - 450 E9/L Final     Sodium   Date Value Ref Range Status   03/31/2022 137 132 - 146 mmol/L Final   03/27/2022 135 132 - 146 mmol/L Final   03/24/2022 135 132 - 146 mmol/L Final     Potassium   Date Value Ref Range Status   03/31/2022 3.8 3.5 - 5.0 mmol/L Final   03/27/2022 4.1 3.5 - 5.0 mmol/L Final   03/24/2022 4.0 3.5 - 5.0 mmol/L Final     Chloride   Date Value Ref Range Status   03/31/2022 100 98 - 107 mmol/L Final   03/27/2022 102 98 - 107 mmol/L Final   03/24/2022 103 98 - 107 mmol/L Final     CO2   Date Value Ref Range Status   03/31/2022 22 22 - 29 mmol/L Final   03/27/2022 22 22 - 29 mmol/L Final   03/24/2022 19 (L) 22 - 29 mmol/L Final     BUN   Date Value Ref Range Status   03/31/2022 43 (H) 6 - 23 mg/dL Final   03/27/2022 28 (H) 6 - 23 mg/dL Final   03/24/2022 33 (H) 6 - 23 mg/dL Final     CREATININE   Date Value Ref Range Status   03/31/2022 1.9 (H) 0.7 - 1.2 mg/dL Final   03/27/2022 1.3 (H) 0.7 - 1.2 mg/dL Final   03/24/2022 1.5 (H) 0.7 - 1.2 mg/dL Final     Glucose   Date Value Ref Range Status   03/31/2022 119 (H) 74 - 99 mg/dL Final   03/27/2022 98 74 - 99 mg/dL Final   03/24/2022 124 (H) 74 - 99 mg/dL Final     Calcium   Date Value Ref Range Status   03/31/2022 8.9 8.6 - 10.2 mg/dL Final   03/27/2022 8.6 8.6 - 10.2 mg/dL Final   03/24/2022 8.5 (L) 8.6 - 10.2 mg/dL Final     Total Protein   Date Value Ref Range Status   03/27/2022 6.6 6.4 - 8.3 g/dL Final   03/24/2022 6.5 6.4 - 8.3 g/dL Final   03/23/2022 6.9 6.4 - 8.3 g/dL Final     Albumin   Date Value Ref Range Status   03/27/2022 3.3 (L) 3.5 - 5.2 g/dL Final   03/24/2022 3.2 (L) 3.5 - 5.2 g/dL Final   03/23/2022 3.5 3.5 - 5.2 g/dL Final     Total Bilirubin   Date Value Ref Range Status   03/27/2022 0.4 0.0 - 1.2 mg/dL Final   03/24/2022 0.4 0.0 - 1.2 mg/dL Final   03/23/2022 0.7 0.0 - 1.2 mg/dL Final     Alkaline Phosphatase   Date Value Ref Range Status   03/27/2022 63 40 - 129 U/L Final   03/24/2022 65 40 - 129 U/L Final   03/23/2022 74 40 - 129 U/L Final     AST   Date Value Ref Range Status   03/27/2022 54 (H) 0 - 39 U/L Final   03/24/2022 24 0 - 39 U/L Final   03/23/2022 28 0 - 39 U/L Final     ALT   Date Value Ref Range Status   03/27/2022 45 (H) 0 - 40 U/L Final   03/24/2022 18 0 - 40 U/L Final   03/23/2022 20 0 - 40 U/L Final     GFR Non-   Date Value Ref Range Status   03/31/2022 34 >=60 mL/min/1.73 Final     Comment:     Chronic Kidney Disease: less than 60 ml/min/1.73 sq.m. Kidney Failure: less than 15 ml/min/1.73 sq.m. Results valid for patients 18 years and older. 03/27/2022 53 >=60 mL/min/1.73 Final     Comment:     Chronic Kidney Disease: less than 60 ml/min/1.73 sq.m. Kidney Failure: less than 15 ml/min/1.73 sq.m. Results valid for patients 18 years and older. 03/24/2022 45 >=60 mL/min/1.73 Final     Comment:     Chronic Kidney Disease: less than 60 ml/min/1.73 sq.m. Kidney Failure: less than 15 ml/min/1.73 sq.m. Results valid for patients 18 years and older.        GFR    Date Value Ref Range Status   03/31/2022 41  Final   03/27/2022 >60  Final   03/24/2022 54  Final     Magnesium   Date Value Ref Range Status   03/24/2022 2.0 1.6 - 2.6 mg/dL Final   03/23/2022 1.9 1.6 - 2.6 mg/dL Final     No results found for: PHOS  Recent Labs     03/31/22  0251   HCO3 19.6*   BE -2.1   O2SAT 86.7*       RADIOLOGY:  CTA CHEST W CONTRAST   Final Result   No evidence of pulmonary embolism. Left apical cavitary lesion, new since the prior examination. Differential   includes infection/cavitating pneumonia including tuberculosis, particularly   given apical location. Underlying malignancy is not excluded. Clinical   correlation recommended. PROBLEM LIST:  Principal Problem:    Acute respiratory failure with hypoxia (HCC)  Resolved Problems:    * No resolved hospital problems. *      IMPRESSION:  1. Left upper lobe infected blebs consistent with pneumonia  2. COPD with exacerbation  3. TORY probably secondary to dehydration and not eating at home  4. COVID-19 positivehad this before on previous admission and also received baricitinib    PLAN:  1. IV fluids  2. Increase Decadron  3. Add Protonix oral  4. Prophylactic Lovenox  5. IV antibiotics per infectious disease  6. Aerosolized bronchodilators  7.  Inhaled steroids      Electronically signed by Roslyn Fischer MD on 3/31/2022 at 5:59 PM

## 2022-03-31 NOTE — PROGRESS NOTES
Pharmacy Consultation Note  (Antibiotic Dosing and Monitoring)    Initial consult date: 3/31/22  Consulting physician/provider: Dr Davey Barahona  Drug: Vancomycin  Indication: Pneumonia    Age/  Gender Height Weight IBW  Allergy Information   81 y.o./male 5' 10.5\" (179.1 cm) 180 lb (81.6 kg)     Ideal body weight: 74.1 kg (163 lb 7.5 oz)  Adjusted ideal body weight: 75.5 kg (166 lb 8.2 oz)   Penicillins      Renal Function:  Recent Labs     03/31/22  0215   BUN 43*   CREATININE 1.9*       Intake/Output Summary (Last 24 hours) at 3/31/2022 1810  Last data filed at 3/31/2022 1451  Gross per 24 hour   Intake 361.66 ml   Output 300 ml   Net 61.66 ml       Vancomycin Monitoring:  Trough:  No results for input(s): VANCOTROUGH in the last 72 hours. Random:  No results for input(s): VANCORANDOM in the last 72 hours. Vancomycin Administration Times:  Recent vancomycin administrations                   vancomycin (VANCOCIN) 1,000 mg in dextrose 5 % 250 mL IVPB (mg) 1,000 mg New Bag 03/31/22 0619                Assessment:  · Patient is a 79 y.o. male who has been initiated on vancomycin  · Estimated Creatinine Clearance: 31 mL/min (A) (based on SCr of 1.9 mg/dL (H)).   · To dose vancomycin, pharmacy will be utilizing dosing based off of levels because of patient's renal impairment/insufficiency    Plan:  · Dose vancomycin per levels until renal function has improved  · S/p vancomycin 1000 mg IV x 1 this morning; give an additional 500 mg IV x 1 tonight  · Random level tomorrow morning  · Will continue to monitor renal function   · Clinical pharmacy to follow      Mercedes Carlos PharmD 3/31/2022 6:12 PM   [phone]

## 2022-03-31 NOTE — ED PROVIDER NOTES
Patient is an 78 y/o male who presents to the ED via EMS with shortness of breath. Patient states that it began approximately one week ago. He states that he was seen here and was feeling better, however, it has since worsened. He denies any chest pain. He denies any fever or cough. He did test positive for COVID-19 one week ago. Review of Systems   Constitutional: Negative for chills and fever. HENT: Negative for ear pain, sinus pressure and sore throat. Eyes: Negative for pain, discharge and redness. Respiratory: Positive for shortness of breath. Negative for cough and wheezing. Cardiovascular: Negative for chest pain. Gastrointestinal: Negative for abdominal pain, diarrhea, nausea and vomiting. Genitourinary: Negative for dysuria and frequency. Musculoskeletal: Negative for arthralgias and back pain. Skin: Negative for rash and wound. Neurological: Negative for weakness and headaches. Hematological: Negative for adenopathy. All other systems reviewed and are negative. Physical Exam  Vitals and nursing note reviewed. Constitutional:       General: He is not in acute distress. HENT:      Head: Normocephalic and atraumatic. Mouth/Throat:      Mouth: Mucous membranes are moist.   Eyes:      Pupils: Pupils are equal, round, and reactive to light. Cardiovascular:      Rate and Rhythm: Normal rate and regular rhythm. Heart sounds: No murmur heard. Pulmonary:      Effort: Pulmonary effort is normal. No respiratory distress. Breath sounds: No stridor. Examination of the right-upper field reveals decreased breath sounds. Examination of the left-upper field reveals decreased breath sounds. Examination of the right-middle field reveals decreased breath sounds. Examination of the left-middle field reveals decreased breath sounds. Examination of the right-lower field reveals decreased breath sounds.  Examination of the left-lower field reveals decreased breath sounds. Decreased breath sounds present. No wheezing, rhonchi or rales. Abdominal:      General: Bowel sounds are normal.      Palpations: Abdomen is soft. Tenderness: There is no abdominal tenderness. There is no guarding. Musculoskeletal:         General: Normal range of motion. Cervical back: Normal range of motion and neck supple. Skin:     General: Skin is warm and dry. Neurological:      Mental Status: He is alert and oriented to person, place, and time. Procedures     MDM             Radiology Procedure Waiver   Name: Celso Baca  : 1940  MRN: [id number]    Date:  3/31/22    Time: 3:22 AM EDT    Benefits of immediately proceeding with radiology exam(s) without pre-testing outweigh the risks or are not indicated as specified below and therefore the following is/are being waived:    [x] Benefits of immediate radiology exam(s) outweigh any risk. OR    Pre-exam testing is not indicated for the following reason(s):  [] Pregnancy test   [] Patients LMP on-time and regular.   [] Patient had Tubal Ligation or has other Contraception Device. [] Patient  is Menopausal or Premenarcheal.    [] Patient had Full or Partial Hysterectomy. [] Protocol for CT contrast allegry   [] Patient has tolerated well previously   [] Patient does not have a true allergy    [] MRI Questionnaire     [] BUN/Creatinine   [] Patient age w/no hx of renal dysfunction. [] Patient on Dialysis. [] Recent Normal Labs. Electronically signed by Connie Horton DO on 3/31/22 at 3:22 AM EDT               --------------------------------------------- PAST HISTORY ---------------------------------------------  Past Medical History:  has a past medical history of COPD (chronic obstructive pulmonary disease) ([facility] [address] Hypertension, and Oxygen dependent.     Past Surgical History:  has a past surgical history that includes hernia repair and Shoulder arthroscopy (Right, 9/9/2020). Social History:  reports that he quit smoking about 5 years ago. He has a 60.00 pack-year smoking history. He has never used smokeless tobacco. He reports that he does not drink alcohol and does not use drugs. Family History: family history is not on file. The patients home medications have been reviewed.     Allergies: Penicillins    -------------------------------------------------- RESULTS -------------------------------------------------    LABS:  Results for orders placed or performed during the hospital encounter of 46/15/47   Basic metabolic panel   Result Value Ref Range    Sodium 137 132 - 146 mmol/L    Potassium 3.8 3.5 - 5.0 mmol/L    Chloride 100 98 - 107 mmol/L    CO2 22 22 - 29 mmol/L    Anion Gap 15 7 - 16 mmol/L    Glucose 119 (H) 74 - 99 mg/dL    BUN 43 (H) 6 - 23 mg/dL    CREATININE 1.9 (H) 0.7 - 1.2 mg/dL    GFR Non-African American 34 >=60 mL/min/1.73    GFR African American 41     Calcium 8.9 8.6 - 10.2 mg/dL   CBC   Result Value Ref Range    WBC 6.8 4.5 - 11.5 E9/L    RBC 5.25 3.80 - 5.80 E12/L    Hemoglobin 16.3 12.5 - 16.5 g/dL    Hematocrit 50.2 37.0 - 54.0 %    MCV 95.6 80.0 - 99.9 fL    MCH 31.0 26.0 - 35.0 pg    MCHC 32.5 32.0 - 34.5 %    RDW 14.8 11.5 - 15.0 fL    Platelets 228 901 - 409 E9/L    MPV 10.9 7.0 - 12.0 fL   Troponin   Result Value Ref Range    Troponin, High Sensitivity <6 0 - 11 ng/L   Brain Natriuretic Peptide   Result Value Ref Range    Pro- 0 - 450 pg/mL   Protime-INR   Result Value Ref Range    Protime 13.3 (H) 9.3 - 12.4 sec    INR 1.1    APTT   Result Value Ref Range    aPTT 28.9 24.5 - 35.1 sec   Arterial Blood Gas, Respiratory Only   Result Value Ref Range    POC Source Arterial     FIO2 10.0     Pt Temp 37.0     PH (TEMPERATURE CORRECTED) 7.478 (H) 7.350 - 7.450    PCO2 (TEMP CORRECTED) 26.4 (L) 35.0 - 45.0 mmHg    PO2 (TEMP CORRECTED) 47.4 (L) 60.0 - 80.0 mmHg    HCO3 19.6 (L) 22.0 - 26.0 mmol/L    B.E. -2.1 -3.0 - 3.0 mmol/L    O2 Sat 86.7 (L) 92.0 - 98.5 %     ID 9,689     DEVICE [id number]     Delivery Systems NRB        RADIOLOGY:  CTA CHEST W CONTRAST   Final Result   No evidence of pulmonary embolism. Left apical cavitary lesion, new since the prior examination. Differential   includes infection/cavitating pneumonia including tuberculosis, particularly   given apical location. Underlying malignancy is not excluded. Clinical   correlation recommended. EKG:  This EKG is signed and interpreted by me. Rate: 105  Rhythm: Sinus and occasional PVCs  Interpretation: non-specific EKG and sinus tachycardia  Comparison: no previous EKG available      ------------------------- NURSING NOTES AND VITALS REVIEWED ---------------------------  Date / Time Roomed:  3/31/2022  1:30 AM  ED Bed Assignment:  06/06    The nursing notes within the ED encounter and vital signs as below have been reviewed. Patient Vitals for the past 24 hrs:   BP Temp Temp src Pulse Resp SpO2 Height Weight   03/31/22 0459    80  100 %     03/31/22 0347 137/73   86 20 100 %     03/31/22 0250      100 %     03/31/22 0228     28 (!) 87 %     03/31/22 0135 115/70 98.6 °F (37 °C) Oral 98 28 95 % 5' 10.5\" (1.791 m) 180 lb (81.6 kg)       Oxygen Saturation Interpretation: Abnormal    ------------------------------------------ PROGRESS NOTES ------------------------------------------  Re-evaluation(s):  Time: 0530. Patients symptoms are improving  Repeat physical examination is improved    Counseling:  I have spoken with the patient and discussed todays results, in addition to providing specific details for the plan of care and counseling regarding the diagnosis and prognosis. Their questions are answered at this time and they are agreeable with the plan of admission.    --------------------------------- ADDITIONAL PROVIDER NOTES ---------------------------------  Consultations:  Time: 0530. Spoke with Dr. Mishel Kidd. Discussed case. They will admit the patient. This patient's ED course included: a personal history and physicial examination, re-evaluation prior to disposition, multiple bedside re-evaluations, IV medications, cardiac monitoring and continuous pulse oximetry    This patient has remained hemodynamically stable during their ED course. Diagnosis:  1. Acute respiratory failure with hypoxia ([address]    2. Cavitary lesion of lung    3. Chronic renal impairment, unspecified CKD stage        Disposition:  Patient's disposition: Admit to [address]  Patient's condition is stable.          [facility],   03/31/22 0755

## 2022-03-31 NOTE — CARE COORDINATION
Readmission Assessment  Number of Days since last admission?: 1-7 days  Previous Disposition: Home with Family  Who is being Interviewed: Caregiver (Spoke via phone with wife as wife in room visiting pt.)  What was the patient's/caregiver's perception as to why they think they needed to return back to the hospital?: Other (Comment) (\"trouble breathing\")  Did you visit your Primary Care Physician after you left the hospital, before you returned this time?: No  Why weren't you able to visit your PCP?: Other (Comment) (Pt did speak with PCP via telephone who recommended pt come to ER, pt was readmitted prior to scheduled PCP appointment.)  Did you see a specialist, such as Cardiac, Pulmonary, Orthopedic Physician, etc. after you left the hospital?: No  Who advised the patient to return to the hospital?: Physician  Does the patient report anything that got in the way of taking their medications?: No  In our efforts to provide the best possible care to you and others like you, can you think of anything that we could have done to help you after you left the hospital the first time, so that you might not have needed to return so soon?: Other (Comment) (\"no\")    Electronically signed by NIDHI Mosley on 3/31/2022 at 2:40 PM

## 2022-03-31 NOTE — H&P
Department of Internal Medicine        CHIEF COMPLAINT: Shortness of breath    Reason for Admission: Left apical cavitary pneumonia    HISTORY OF PRESENT ILLNESS:      The patient is a 79 y.o. male who presents with being discharged back on 327 with the patient having a history of being admitted 3/23 with symptoms of shortness of breath for 2 weeks prior to that. Patient was so weak that he was not able to get out of bed 4 days before admission. Patient was tested positive for Covid with the patient never being vaccinated. Patient D-dimer was 534 on that admission. Patient white count was normal with patient having history of chronic hypoxic respiratory failure normally on 3 L nasal cannula from COPD. Patient stated he was feeling back to normal and adamantly request to be discharged home with his wife at the bedside. Patient's white count was normal and on 4 L nasal cannula at rest his O2 sat was 94-97%. Patient did need to increase his O2 to 6 L with activity. Patient stated that he was feeling better but progressively got more short of breath and came to the ED with a CAT scan showing new apical cavitary lesion since prior examination. Chest x-ray back on 3/23 showed a ill-defined patchy medial left upper lobe infiltrate. Patient currently on 15 L high flow nasal cannula with O2 sat 93%. Case discussed with infectious disease. Patient's wife is at the bedside and case discussed.     Past Medical History:    Past Medical History:   Diagnosis Date    COPD (chronic obstructive pulmonary disease) ([address]     Hypertension     Oxygen dependent     2 l doesnt use continuous     Past Surgical History:    Past Surgical History:   Procedure Laterality Date    HERNIA REPAIR      age 61   [facility] SHOULDER ARTHROSCOPY Right 9/9/2020    RIGHT SHOULDER ARTHROSCOPY, SUBACROMIAL DECOMPRESSION,  DEBRIDEMENT LABRIUM AND ROTATOR CUFF, CHONDROPLASTY (ARTHREX) performed by Juana Lau DO at [address]       Medications Prior to Admission:    @  Prior to Admission medications    Medication Sig Start Date End Date Taking?  Authorizing Provider   ipratropium-albuterol (DUONEB) 0.5-2.5 (3) MG/3ML SOLN nebulizer solution  11/16/21   Historical Provider, MD   zinc sulfate (ZINCATE) 220 (50 Zn) MG capsule Take 1 capsule by mouth daily  Patient taking differently: Take 100 mg by mouth daily  3/26/22   Johnnie Vergara DO   ascorbic acid (VITAMIN C) 1000 MG tablet Take 1 tablet by mouth daily 3/26/22   Johnnie Vergara DO   dexamethasone (DECADRON) 6 MG tablet Take 1 tablet by mouth daily (with breakfast) for 6 days 3/26/22 4/1/22  Johnnie Vergara DO   budesonide (PULMICORT) 0.5 MG/2ML nebulizer suspension inhale 1 vial via nebulizer twice daily 12/16/21   Historical Provider, MD   formoterol (PERFOROMIST) 20 MCG/2ML nebulizer solution inhale 1 unit dose via nebulizer twice daily 12/16/21   Historical Provider, MD   lisinopril-hydroCHLOROthiazide (PRINZIDE;ZESTORETIC) 20-12.5 MG per tablet TAKE ONE TABLET BY MOUTH DAILY 11/12/21   Ramírez Desai DO   omeprazole (PRILOSEC) 20 MG delayed release capsule TAKE ONE CAPSULE BY MOUTH EVERY MORNING 5/27/21   Historical Provider, MD   fluticasone [facility]) 50 MCG/ACT nasal spray 2 sprays by Each Nostril route daily 12/21/20   INDIGO Plaza - CNP   OXYGEN Inhale 2 L into the lungs intermittent    Historical Provider, MD   Cyanocobalamin (VITAMIN B 12 PO) Take 500 mcg by mouth daily     Historical Provider, MD   Cholecalciferol (VITAMIN D3) 50 MCG (2000 UT) TABS Take 2 tablets by mouth daily    Historical Provider, MD   albuterol sulfate  (90 Base) MCG/ACT inhaler Inhale 2 puffs into the lungs 4 times daily as needed for Wheezing 12/24/19   Ramírez Desai DO       Allergies:  Penicillins    Social History:   Social History     Socioeconomic History    Marital status:      Spouse name: Not on file    Number of children: Not on file    Years of education: Not on file    Highest education level: Not on file   Occupational History    Not on file   Tobacco Use    Smoking status: Former Smoker     Packs/day: 1.00     Years: 60.00     Pack years: 60.00     Quit date: 2016     Years since quittin.3    Smokeless tobacco: Never Used   Vaping Use    Vaping Use: Never used   Substance and Sexual Activity    Alcohol use: No    Drug use: No    Sexual activity: Not on file   Other Topics Concern    Not on file   Social History Narrative    Not on file     Social Determinants of Health     Financial Resource Strain: Low Risk     Difficulty of Paying Living Expenses: Not hard at all   Food Insecurity: No Food Insecurity    Worried About [employer] in the Last Year: Never true    [employer] in the Last Year: Never true   Transportation Needs: No Transportation Needs    Lack of Transportation (Medical): No    Lack of Transportation (Non-Medical): No   Physical Activity:     Days of Exercise per Week: Not on file    Minutes of Exercise per Session: Not on file   Stress:     Feeling of Stress : Not on file   Social Connections:     Frequency of Communication with Friends and Family: Not on file    Frequency of Social Gatherings with Friends and Family: Not on file    Attends Mu-ism Services: Not on file    Active Member of [address] [place] [id number] or Organizations: Not on file    Attends Club or Organization Meetings: Not on file    Marital Status: Not on file   Intimate Partner Violence:     Fear of Current or Ex-Partner: Not on file    Emotionally Abused: Not on file    Physically Abused: Not on file    Sexually Abused: Not on file   Housing Stability:     Unable to Pay for Housing in the Last Year: Not on file    Number of [place] in the Last Year: Not on file    Unstable Housing in the Last Year: Not on file       Family History:   History reviewed. No pertinent family history.     REVIEW OF SYSTEMS:    Gen: Patient denies any lightheadedness or dizziness. No LOC or syncope. No fevers or chills. HEENT: No earache, sore throat or nasal congestion. Resp: Denies cough, hemoptysis or sputum production. Cardiac: Denies chest pain,+ SOB,no diaphoresis or palpitations. GI: No nausea, vomiting, diarrhea or constipation. No melena or hematochezia. : No urinary complaints, dysuria, hematuria or frequency. MSK: No extremity weakness, paralysis or paresthesias. PHYSICAL EXAM:    Vitals:  /63   Pulse 79   Temp 98 °F (36.7 °C) (Infrared)   Resp 20   Ht 5' 10.5\" (1.791 m)   Wt 171 lb 1.2 oz (77.6 kg)   SpO2 93%   BMI 24.20 kg/m²     General:  This is a 79 y.o. yo male who is alert and oriented in moderate respiratory distress  HEENT:  Head is normocephalic and atraumatic, PERRLA, EOMI, mucus membranes moist with no pharyngeal erythema or exudate. Neck:  Supple with no carotid bruits, JVD or thyromegaly.   No cervical adenopathy  CV:  Regular rate and rhythm, no murmurs  Lungs: Coarse decreased breath sounds to auscultation bilaterally with scattered crackles and no wheezes or rhonchi  Abdomen:  Soft, nontender, nondistended, bowel sounds present  Extremities:  No edema, peripheral pulses intact bilaterally  Neuro:  Cranial nerves II-XII grossly intact; motor and sensory function intact with no focal deficits  Skin:  No rashes, lesions or wounds    DATA:  CBC with Differential:    Lab Results   Component Value Date    WBC 6.8 03/31/2022    RBC 5.25 03/31/2022    HGB 16.3 03/31/2022    HCT 50.2 03/31/2022     03/31/2022    MCV 95.6 03/31/2022    MCH 31.0 03/31/2022    MCHC 32.5 03/31/2022    RDW 14.8 03/31/2022    LYMPHOPCT 5.2 03/27/2022    MONOPCT 5.2 03/27/2022    BASOPCT 0.2 03/27/2022    MONOSABS 0.22 03/27/2022    LYMPHSABS 0.22 03/27/2022    EOSABS 0.00 03/27/2022    BASOSABS 0.00 03/27/2022     CMP:    Lab Results   Component Value Date     03/31/2022    K 3.8 03/31/2022     03/31/2022    CO2 22 03/31/2022 BUN 43 03/31/2022    CREATININE 1.9 03/31/2022    GFRAA 41 03/31/2022    LABGLOM 34 03/31/2022    GLUCOSE 119 03/31/2022    PROT 6.6 03/27/2022    LABALBU 3.3 03/27/2022    CALCIUM 8.9 03/31/2022    BILITOT 0.4 03/27/2022    ALKPHOS 63 03/27/2022    AST 54 03/27/2022    ALT 45 03/27/2022     Magnesium:    Lab Results   Component Value Date    MG 2.0 03/24/2022     Phosphorus:  No results found for: PHOS  PT/INR:    Lab Results   Component Value Date    PROTIME 13.3 03/31/2022    INR 1.1 03/31/2022     Troponin:  No results found for: TROPONINI  U/A:    Lab Results   Component Value Date    COLORU DKYELLOW 03/24/2022    PROTEINU Negative 03/24/2022    PHUR 5.5 03/24/2022    WBCUA 1-3 03/24/2022    RBCUA NONE 03/24/2022    BACTERIA MANY 03/24/2022    CLARITYU Clear 03/24/2022    SPECGRAV >=1.030 03/24/2022    LEUKOCYTESUR Negative 03/24/2022    UROBILINOGEN 0.2 03/24/2022    BILIRUBINUR Negative 03/24/2022    BLOODU Negative 03/24/2022    GLUCOSEU Negative 03/24/2022     ABG:    Lab Results   Component Value Date    HCO3 19.6 03/31/2022    BE -2.1 03/31/2022    O2SAT 86.7 03/31/2022     HgBA1c:    Lab Results   Component Value Date    LABA1C 5.5 12/01/2021     FLP:    Lab Results   Component Value Date    TRIG 66 03/24/2022    HDL 45 03/24/2022    LDLCALC 110 03/24/2022    LABVLDL 13 03/24/2022     TSH:    Lab Results   Component Value Date    TSH 1.070 03/24/2022     IRON:  No results found for: IRON  LIPASE:  No results found for: LIPASE    ASSESSMENT AND PLAN:      Patient Active Problem List    Diagnosis Date Noted    Acute respiratory failure with hypoxia ([facility] [address] 03/31/2022    COPD exacerbation ([facility] [address] 03/23/2022    COVID-19 03/23/2022    Chronic bronchitis ([facility] [address] 04/20/2021    Shoulder impingement, right 08/17/2020    Nontraumatic complete tear of right rotator cuff 08/17/2020     Impression:  1. Acute on chronic hypoxic respiratory failure  2. Left upper lobe cavitary pneumonia  3.   History of COVID-19 infection March 23  4. History of COPD  5. History hypertension  6. Acute kidney injury  7. History of chronic kidney disease stage III  8. Oral thrush    Plan:  Admit to [facility]  Home medications reviewed  Monitor heart rate, blood pressure, O2 saturation  Consult ID  Consult pulmonology  Lovenox 40 mg subcu daily  General diet  Activity as tolerated  Heated high flow nasal cannula  IV fluids normal saline 20 KCl at 75 cc an hour  IV bolus normal saline 500 cc x 1  Hold Prinzide  IV Eraxis  IV Merrem  Decadron 6 mg IV push daily    CMP, CBC, D-dimer in a.m.     Rosie Poe DO, D.O.  3/31/2022  12:32 PM

## 2022-03-31 NOTE — ACP (ADVANCE CARE PLANNING)
Advance Care Planning     Advance Care Planning Activator (Inpatient)  Conversation Note      Date of ACP Conversation: 3/31/2022     Conversation Conducted with:   Pt's wife Kierra Chadwick [phone]    ACP Activator: NIDHI Mosley      Health Care Decision Maker:     Current Designated Health Care Decision Maker:     Primary Decision Maker: Aldo Alvarado Spouse - [phone]       Care Preferences    Ventilation: \"If you were in your present state of health and suddenly became very ill and were unable to breathe on your own, what would your preference be about the use of a ventilator (breathing machine) if it were available to you? \"      Would the patient desire the use of ventilator (breathing machine)?: yes    \"If your health worsens and it becomes clear that your chance of recovery is unlikely, what would your preference be about the use of a ventilator (breathing machine) if it were available to you? \"     Would the patient desire the use of ventilator (breathing machine)?: No      Resuscitation  \"CPR works best to restart the heart when there is a sudden event, like a heart attack, in someone who is otherwise healthy. Unfortunately, CPR does not typically restart the heart for people who have serious health conditions or who are very sick. \"    \"In the event your heart stopped as a result of an underlying serious health condition, would you want attempts to be made to restart your heart (answer \"yes\" for attempt to resuscitate) or would you prefer a natural death (answer \"no\" for do not attempt to resuscitate)? \" yes       [] Yes   [] No   Educated Patient / Rebbecca Branch regarding differences between Advance Directives and portable DNR orders.     Length of ACP Conversation in minutes:      Conversation Outcomes:  [x] ACP discussion completed  [] Existing advance directive reviewed with patient; no changes to patient's previously recorded wishes  [] New Advance Directive completed  [] Portable Do Not Rescitate prepared for Provider review and signature  [] POLST/POST/MOLST/MOST prepared for Provider review and signature      Follow-up plan:    [] Schedule follow-up conversation to continue planning  [] Referred individual to Provider for additional questions/concerns   [] Advised patient/agent/surrogate to review completed ACP document and update if needed with changes in condition, patient preferences or care setting    [x] This note routed to one or more involved healthcare providers        Electronically signed by NIDHI Rayo on 3/31/2022 at 2:36 PM

## 2022-03-31 NOTE — PROGRESS NOTES
This RT called to ER to place patient on BiPAP due to oxygen desaturations. Patient found on 10L Non-rebreather sat 81%. Increased to 15L sat 84%. BiPAP applied 12/6 100%. Sat 94%.

## 2022-03-31 NOTE — CARE COORDINATION
SS Note: Covid positive last admit 3/23/22 and today 3/31 Covid positive. Pt on 15 liter non-rebreather mask so SW completed ACP, Readmit, and initial assessment via telephone with pt's wife who was visiting pt in room. Stated they reside in a one story home, three steps with a rail to enter home. Stated pt usually is independent however yesterday the did purchase a wheeled walker for pt. Pt also owns a shower chair and wears 3 liters Home O2 from [address]. Stated no past SNF or HHC. Stated PCP is Dr. Cameron Venegas. Patient has prescription coverage, uses [facility]. Wife stated plan is for pt to return home upon discharge and she will transport home. Stated they are receptive to [facility], reviewed [facility] options over phone, wife requested [facility]. SW called referral to [facility] [address] [place] [address], and she is reviewing.   Electronically signed by NIDHI Arellano on 3/31/2022 at 3:18 PM

## 2022-04-01 NOTE — PROGRESS NOTES
Pharmacy Consultation Note  (Antibiotic Dosing and Monitoring)    Initial consult date: 3/31/22  Consulting physician/provider: Dr Hudson Iraheta  Drug: Vancomycin  Indication: Pneumonia    Age/  Gender Height Weight IBW  Allergy Information   81 y.o./male 5' 10.5\" (179.1 cm) 180 lb (81.6 kg)     Ideal body weight: 74.1 kg (163 lb 7.5 oz)  Adjusted ideal body weight: 75.5 kg (166 lb 8.2 oz)   Penicillins      Renal Function:  Recent Labs     03/31/22  0215 04/01/22  0503   BUN 43* 39*   CREATININE 1.9* 1.3*       Intake/Output Summary (Last 24 hours) at 4/1/2022 1402  Last data filed at 4/1/2022 7394  Gross per 24 hour   Intake 1694.5 ml   Output 475 ml   Net 1219.5 ml       Vancomycin Monitoring:  Trough:  No results for input(s): VANCOTROUGH in the last 72 hours. Random:    Recent Labs     04/01/22  0503   VANCORANDOM 8.6       Vancomycin Administration Times:  Recent vancomycin administrations                   vancomycin (VANCOCIN) 1,000 mg in dextrose 5 % 250 mL IVPB (mg) 1,000 mg New Bag 03/31/22 0619                Assessment:  · Patient is a 79 y.o. male who has been initiated on vancomycin  Estimated Creatinine Clearance: 46 mL/min (A) (based on SCr of 1.3 mg/dL (H)).   · To dose vancomycin, pharmacy will be utilizing dosing based off of levels because of patient's renal impairment/insufficiency    Plan:  · Will attempt scheduled dosing but monitor closely for renal fluctuation  · Vancomycin 1000 mg IV Q18H  · Trough prior to third or earlier per renal function  · Will continue to monitor renal function   · Clinical pharmacy to follow      Montrell Seo PharmD 4/1/2022 2:02 PM   [phone]

## 2022-04-01 NOTE — PLAN OF CARE
Problem: Falls - Risk of:  Goal: Will remain free from falls  Description: Will remain free from falls  4/1/2022 0347 by Anabell Sandoval RN  Outcome: Met This Shift     Problem: Falls - Risk of:  Goal: Absence of physical injury  Description: Absence of physical injury  4/1/2022 0347 by Anabell Sandoval RN  Outcome: Met This Shift     Problem: Airway Clearance - Ineffective  Goal: Achieve or maintain patent airway  4/1/2022 0347 by Anabell Sandoval RN  Outcome: Met This Shift     Problem: Gas Exchange - Impaired  Goal: Absence of hypoxia  4/1/2022 0347 by Anabell Sandoval RN  Outcome: Met This Shift     Problem: Gas Exchange - Impaired  Goal: Promote optimal lung function  4/1/2022 0347 by Anabell Sandoval RN  Outcome: Met This Shift     Problem: Body Temperature -  Risk of, Imbalanced  Goal: Ability to maintain a body temperature within defined limits  4/1/2022 0347 by Anabell Sandoval RN  Outcome: Met This Shift     Problem: Body Temperature -  Risk of, Imbalanced  Goal: Will regain or maintain usual level of consciousness  4/1/2022 0347 by Anabell Sandoval RN  Outcome: Met This Shift     Problem:  Body Temperature -  Risk of, Imbalanced  Goal: Complications related to the disease process, condition or treatment will be avoided or minimized  4/1/2022 0347 by Anabell Sandoval RN  Outcome: Met This Shift     Problem: Risk for Fluid Volume Deficit  Goal: Maintain normal heart rhythm  4/1/2022 0347 by Anabell Sandoval RN  Outcome: Met This Shift     Problem: Nutrition Deficits  Goal: Optimize nutritional status  4/1/2022 0347 by Anabell Sandoval RN  Outcome: Met This Shift     Problem: Fatigue  Goal: Verbalize increase energy and improved vitality  4/1/2022 0347 by Anabell Sandoval RN  Outcome: Met This Shift     Problem: Patient Education: Go to Patient Education Activity  Goal: Patient/Family Education  4/1/2022 0347 by Anabell Sandoval RN  Outcome: Met This Shift     Problem: Skin Integrity:  Goal: Absence of new skin breakdown  Description: Absence of new skin breakdown  4/1/2022 0347 by Nuno Tran RN  Outcome: Met This Shift     Problem: Skin Integrity:  Goal: Absence of new skin breakdown  Description: Absence of new skin breakdown  4/1/2022 0347 by Nuno Tran RN  Outcome: Met This Shift

## 2022-04-01 NOTE — PROGRESS NOTES
[address] [place] [address] [facility]  [facility]  Progress Note    CC: COVID   Face to face encounter   SUBJECTIVE:  Patient is tolerating medications. No reported adverse drug reactions. ROS: No nausea, vomiting, diarrhea. Has been afebrile. Up to chair- some accessory muscle use  On high flow nasal canula. 75-95 % FIO2  Wife at bedside and updated     Medications:  Scheduled Meds:   anidulafungin  100 mg IntraVENous Q24H    meropenem  1,000 mg IntraVENous Q12H    vitamin B-6  50 mg Oral Daily    thiamine mononitrate  100 mg Oral Daily    vitamin E  400 Units Oral Daily    melatonin  5 mg Oral Nightly    Vitamin D  4,000 Units Oral Daily    fluticasone  2 spray Each Nostril Daily    pantoprazole  40 mg Oral QAM AC    zinc sulfate  50 mg Oral Daily    ascorbic acid  1,000 mg Oral Daily    enoxaparin  30 mg SubCUTAneous Daily    dexamethasone  6 mg IntraVENous Q8H    ipratropium-albuterol  1 ampule Inhalation Q4H    budesonide  0.5 mg Nebulization BID    vancomycin (VANCOCIN) intermittent dosing (placeholder)   Other RX Placeholder     Continuous Infusions:   sodium chloride Stopped (03/31/22 1436)    0.9% NaCl with KCl 20 mEq 75 mL/hr at 04/01/22 0633     PRN Meds:prochlorperazine, acetaminophen  OBJECTIVE:  Patient Vitals for the past 24 hrs:   BP Temp Temp src Pulse Resp SpO2   04/01/22 0950      (!) 89 %   04/01/22 0844 (!) 99/58 97.9 °F (36.6 °C)  75 24 92 %   04/01/22 0621      94 %   03/31/22 2345 (!) 91/53 97.8 °F (36.6 °C) Infrared 64 26 97 %   03/31/22 2148     24 93 %   03/31/22 1930 105/61 97.7 °F (36.5 °C) Infrared 85 24 95 %   03/31/22 1456 119/62 97.6 °F (36.4 °C) Infrared 75 20 94 %     Constitutional: The patient was assisted to chair by wife in room. On high flow . Thin   Skin: Warm and dry. No rashes were noted. Ecchymotic area   Head: Eyes show round, and reactive pupils. No jaundice. Mouth: Moist mucous membranes, no ulcerations, no thrush.    Neck: Supple to movements. No lymphadenopathy. Chest: minimal use of accessory muscles to breathe. Symmetrical expansion. Auscultation reveals some expiratory wheezes noted. On high flow nasal canula. Cardiovascular: S1 and S2 are rhythmic and regular. Abdomen: Positive bowel sounds to auscultation. Benign to palpation. Extremities: No clubbing, no cyanosis, + edema.   PIV    Laboratory and Tests Review:  Lab Results   Component Value Date    WBC 11.0 04/01/2022    WBC 6.8 03/31/2022    WBC 4.3 (L) 03/27/2022    HGB 13.4 04/01/2022    HCT 41.2 04/01/2022    MCV 97.6 04/01/2022     04/01/2022     Lab Results   Component Value Date    NEUTROABS 10.89 (H) 04/01/2022    NEUTROABS 3.87 03/27/2022    NEUTROABS 4.14 03/24/2022     Lab Results   Component Value Date    CRP 4.4 (H) 04/01/2022    CRP 2.6 (H) 03/27/2022    CRP 7.6 (H) 03/23/2022     Lab Results   Component Value Date    SEDRATE 28 (H) 04/01/2022     Lab Results   Component Value Date    ALT 40 04/01/2022    AST 31 04/01/2022    ALKPHOS 55 04/01/2022    BILITOT 0.4 04/01/2022     Lab Results   Component Value Date     04/01/2022    K 4.4 04/01/2022     04/01/2022    CO2 17 04/01/2022    BUN 39 04/01/2022    CREATININE 1.3 04/01/2022    GFRAA >60 04/01/2022    LABGLOM 53 04/01/2022    GLUCOSE 131 04/01/2022    PROT 5.7 04/01/2022    LABALBU 2.9 04/01/2022    CALCIUM 7.9 04/01/2022    BILITOT 0.4 04/01/2022    ALKPHOS 55 04/01/2022    AST 31 04/01/2022    ALT 40 04/01/2022      SARS-COV-2 biomarkers    Recent Labs     03/31/22  0215 03/31/22  0607 04/01/22  0503   CRP  --   --  4.4*   PROCAL  --  0.12*  --    FERRITIN  --   --  1,435   LDH  --   --  256*   DDIMER  --   --  786   FIBRINOGEN  --   --  428   INR 1.1  --  1.2   PROTIME 13.3*  --  13.4*   AST  --   --  31   ALT  --   --  40     Lab Results   Component Value Date    CHOL 168 03/24/2022    TRIG 66 03/24/2022    HDL 45 03/24/2022    LDLCALC 110 03/24/2022    LABVLDL 13 03/24/2022 Lab Results   Component Value Date/Time    VITD25 >120 (H) 03/31/2022 11:21 AM     Radiology:  Reviewed     Microbiology:   3/31/2022- blood cx- no growth to date   3/31/2022- respiratory panel- COVID++   Strep pneumo and legionella- pending     ASSESSMENT:  · COVID -19  · Cavitary lung lesion   · Unvaccinated  · History of COPD  · Hypoxia   · S/p treatment with Baricitinib     PLAN:  · Currently on vancomycin , meropenem and eraxis- will narrow down once cultures/ labs are back   · Labs pending-   · Check cultures  · Monitor labs- procal- 0.12   · Wife at bedside and updated   · Monitor respiratory status- FIO2- 75-95% on high flow nasal canula. Oxygen needs are increasing     INDIGO Slade  11:56 AM  4/1/2022       As above    The patient has COVID-19 infection. To prevent self exposure and cross contamination care will be coordinated with patient's care team. All relevant records and diagnostic tests were reviewed in EMR, including laboratory results and imaging. I have directed the medical decision making, and  POC with the NURSE PRACTITIONER, INDIGO Slade.      covid + poss bacterial pneuomia procal 0.12  vancomycin (VANCOCIN) 1,000 mg in dextrose 5 % 250 mL IVPB, Q18H  anidulafungin (ERAXIS) 100 mg in dextrose 5 % 130 mL IVPB, Q24H  meropenem (MERREM) 1,000 mg in sodium chloride 0.9 % 100 mL IVPB (mini-bag), Q12H  0.9 % sodium chloride infusion, Continuous  vitamin B-6 (PYRIDOXINE) tablet 50 mg, Daily  thiamine mononitrate tablet 100 mg, Daily  vitamin E capsule 400 Units, Daily  melatonin disintegrating tablet 5 mg, Nightly  Vitamin D (CHOLECALCIFEROL) tablet 4,000 Units, Daily  fluticasone (FLONASE) 50 MCG/ACT nasal spray 2 spray, Daily  pantoprazole (PROTONIX) tablet 40 mg, QAM AC  zinc sulfate (ZINCATE) capsule 50 mg, Daily  ascorbic acid (VITAMIN C) tablet 1,000 mg, Daily  dexamethasone (PF) (DECADRON) injection 6 mg, Q8H    Check cx/lab     Imaging and labs were reviewed. Thank you for involving me in the care of Ace Torres. Please do not hesitate to call for any questions or concerns.     Electronically signed by Mouna Linda MD on 4/1/2022 at 7:16 PM

## 2022-04-01 NOTE — PROGRESS NOTES
Pulmonary/Critical Care Progress Note    We are following patient for left upper lobe pneumonia, severe COPD, suspect inspissated secretions, history of COVID-19 (unvaccinated)    SUBJECTIVE:  Patient still requires FiO2 approaching 100% on heated high flow nasal cannula. I suspect he has inspissated secretions and has a very difficult time coughing them up which is creating an alveolar-arterial oxygen gradient necessitating high FiO2 administration. Therefore, we will initiate a flutter device and chest vest percussion while continuing IV antibiotics and IV steroids. At some point, may be necessary to do bronchoscopy but currently, he is at to greater risk for intubation. His wife was present and I explained to her what was the essential problem. We will continue aggressive bronchopulmonary hygiene and repeat a chest x-ray over the weekend. MEDICATIONS:   vancomycin  1,000 mg IntraVENous Q18H    anidulafungin  100 mg IntraVENous Q24H    meropenem  1,000 mg IntraVENous Q12H    vitamin B-6  50 mg Oral Daily    thiamine mononitrate  100 mg Oral Daily    vitamin E  400 Units Oral Daily    melatonin  5 mg Oral Nightly    Vitamin D  4,000 Units Oral Daily    fluticasone  2 spray Each Nostril Daily    pantoprazole  40 mg Oral QAM AC    zinc sulfate  50 mg Oral Daily    ascorbic acid  1,000 mg Oral Daily    enoxaparin  30 mg SubCUTAneous Daily    dexamethasone  6 mg IntraVENous Q8H    ipratropium-albuterol  1 ampule Inhalation Q4H    budesonide  0.5 mg Nebulization BID      sodium chloride Stopped (03/31/22 1436)    0.9% NaCl with KCl 20 mEq 75 mL/hr at 04/01/22 3075     prochlorperazine, acetaminophen      REVIEW OF SYSTEMS:  Constitutional: Denies fever, weight loss, night sweats, and fatigue  Skin: Denies pigmentation, dark lesions, and rashes   HEENT: Denies hearing loss, tinnitus, ear drainage, epistaxis, sore throat, and hoarseness.   Cardiovascular: Denies palpitations, chest pain, and chest pressure. Respiratory: Positive for cough, positive for dyspnea at rest, hemoptysis, apnea, and choking. Gastrointestinal: Denies nausea, vomiting, poor appetite, diarrhea, heartburn or reflux  Genitourinary: Denies dysuria, frequency, urgency or hematuria  Musculoskeletal: Denies myalgias, muscle weakness, and bone pain  Neurological: Denies dizziness, vertigo, headache, and focal weakness  Psychological: Denies anxiety and depression  Endocrine: Denies heat intolerance and cold intolerance  Hematopoietic/Lymphatic: Denies bleeding problems and blood transfusions    OBJECTIVE:  Vitals:    04/01/22 1415   BP: 110/64   Pulse: 69   Resp: 22   Temp: 96.7 °F (35.9 °C)   SpO2: 94%     FiO2 : 95 %  O2 Flow Rate (L/min): 60 L/min  O2 Device: Heated high flow cannula    PHYSICAL EXAM:  Constitutional: No fever, chills, diaphoresis  Skin: No skin rash, no skin breakdown  HEENT: Unremarkable  Neck: Positive use of accessory muscles of breathing, no JVD or lymphadenopathy  Cardiovascular: S1, S2 regular. No S3 murmurs rubs present  Respiratory: Few inspiratory crackles left upper lung area. Soft expiratory wheezes bilaterally. Gastrointestinal: Soft, flat, nontender  Genitourinary: No CVA tenderness  Extremities: No clubbing, cyanosis, or edema  Neurological: Awake, alert, oriented x3.   No evidence of focal motor or sensory deficits  Psychological: Seems in slightly better spirits today    LABS:  WBC   Date Value Ref Range Status   04/01/2022 11.0 4.5 - 11.5 E9/L Final   03/31/2022 6.8 4.5 - 11.5 E9/L Final   03/27/2022 4.3 (L) 4.5 - 11.5 E9/L Final     Hemoglobin   Date Value Ref Range Status   04/01/2022 13.4 12.5 - 16.5 g/dL Final   03/31/2022 16.3 12.5 - 16.5 g/dL Final   03/27/2022 14.6 12.5 - 16.5 g/dL Final     Hematocrit   Date Value Ref Range Status   04/01/2022 41.2 37.0 - 54.0 % Final   03/31/2022 50.2 37.0 - 54.0 % Final   03/27/2022 44.7 37.0 - 54.0 % Final     MCV   Date Value Ref Range Status 04/01/2022 97.6 80.0 - 99.9 fL Final   03/31/2022 95.6 80.0 - 99.9 fL Final   03/27/2022 95.9 80.0 - 99.9 fL Final     Platelets   Date Value Ref Range Status   04/01/2022 291 130 - 450 E9/L Final   03/31/2022 333 130 - 450 E9/L Final   03/27/2022 138 130 - 450 E9/L Final     Sodium   Date Value Ref Range Status   04/01/2022 139 132 - 146 mmol/L Final   03/31/2022 137 132 - 146 mmol/L Final   03/27/2022 135 132 - 146 mmol/L Final     Potassium   Date Value Ref Range Status   04/01/2022 4.4 3.5 - 5.0 mmol/L Final   03/31/2022 3.8 3.5 - 5.0 mmol/L Final   03/27/2022 4.1 3.5 - 5.0 mmol/L Final     Chloride   Date Value Ref Range Status   04/01/2022 109 (H) 98 - 107 mmol/L Final   03/31/2022 100 98 - 107 mmol/L Final   03/27/2022 102 98 - 107 mmol/L Final     CO2   Date Value Ref Range Status   04/01/2022 17 (L) 22 - 29 mmol/L Final   03/31/2022 22 22 - 29 mmol/L Final   03/27/2022 22 22 - 29 mmol/L Final     BUN   Date Value Ref Range Status   04/01/2022 39 (H) 6 - 23 mg/dL Final   03/31/2022 43 (H) 6 - 23 mg/dL Final   03/27/2022 28 (H) 6 - 23 mg/dL Final     CREATININE   Date Value Ref Range Status   04/01/2022 1.3 (H) 0.7 - 1.2 mg/dL Final   03/31/2022 1.9 (H) 0.7 - 1.2 mg/dL Final   03/27/2022 1.3 (H) 0.7 - 1.2 mg/dL Final     Glucose   Date Value Ref Range Status   04/01/2022 131 (H) 74 - 99 mg/dL Final   03/31/2022 119 (H) 74 - 99 mg/dL Final   03/27/2022 98 74 - 99 mg/dL Final     Calcium   Date Value Ref Range Status   04/01/2022 7.9 (L) 8.6 - 10.2 mg/dL Final   03/31/2022 8.9 8.6 - 10.2 mg/dL Final   03/27/2022 8.6 8.6 - 10.2 mg/dL Final     Total Protein   Date Value Ref Range Status   04/01/2022 5.7 (L) 6.4 - 8.3 g/dL Final   03/27/2022 6.6 6.4 - 8.3 g/dL Final   03/24/2022 6.5 6.4 - 8.3 g/dL Final     Albumin   Date Value Ref Range Status   04/01/2022 2.9 (L) 3.5 - 5.2 g/dL Final   03/27/2022 3.3 (L) 3.5 - 5.2 g/dL Final   03/24/2022 3.2 (L) 3.5 - 5.2 g/dL Final     Total Bilirubin   Date Value Ref Range Status   04/01/2022 0.4 0.0 - 1.2 mg/dL Final   03/27/2022 0.4 0.0 - 1.2 mg/dL Final   03/24/2022 0.4 0.0 - 1.2 mg/dL Final     Alkaline Phosphatase   Date Value Ref Range Status   04/01/2022 55 40 - 129 U/L Final   03/27/2022 63 40 - 129 U/L Final   03/24/2022 65 40 - 129 U/L Final     AST   Date Value Ref Range Status   04/01/2022 31 0 - 39 U/L Final   03/27/2022 54 (H) 0 - 39 U/L Final   03/24/2022 24 0 - 39 U/L Final     ALT   Date Value Ref Range Status   04/01/2022 40 0 - 40 U/L Final   03/27/2022 45 (H) 0 - 40 U/L Final   03/24/2022 18 0 - 40 U/L Final     GFR Non-   Date Value Ref Range Status   04/01/2022 53 >=60 mL/min/1.73 Final     Comment:     Chronic Kidney Disease: less than 60 ml/min/1.73 sq.m. Kidney Failure: less than 15 ml/min/1.73 sq.m. Results valid for patients 18 years and older. 03/31/2022 34 >=60 mL/min/1.73 Final     Comment:     Chronic Kidney Disease: less than 60 ml/min/1.73 sq.m. Kidney Failure: less than 15 ml/min/1.73 sq.m. Results valid for patients 18 years and older. 03/27/2022 53 >=60 mL/min/1.73 Final     Comment:     Chronic Kidney Disease: less than 60 ml/min/1.73 sq.m. Kidney Failure: less than 15 ml/min/1.73 sq.m. Results valid for patients 18 years and older. GFR    Date Value Ref Range Status   04/01/2022 >60  Final   03/31/2022 41  Final   03/27/2022 >60  Final     Magnesium   Date Value Ref Range Status   03/24/2022 2.0 1.6 - 2.6 mg/dL Final   03/23/2022 1.9 1.6 - 2.6 mg/dL Final     No results found for: PHOS  Recent Labs     03/31/22  0251   HCO3 19.6*   BE -2.1   O2SAT 86.7*       RADIOLOGY:  CTA CHEST W CONTRAST   Final Result   No evidence of pulmonary embolism. Left apical cavitary lesion, new since the prior examination. Differential   includes infection/cavitating pneumonia including tuberculosis, particularly   given apical location. Underlying malignancy is not excluded.   Clinical correlation recommended. XR CHEST PORTABLE    (Results Pending)           PROBLEM LIST:  Principal Problem:    Acute respiratory failure with hypoxia (HCC)  Resolved Problems:    * No resolved hospital problems. *      IMPRESSION:  1. Left upper lobe infected blebs consistent with pneumonia  2. COPD exacerbation, likely accompanied by inspissated secretions  3. TORYimproving  4. COVID-19 positive    PLAN:  1. Continue IV fluids for now  2. Continue current Decadron dose,  3. IV antibiotics per infectious disease service  4. Continue inhaled bronchodilators and steroids  Chest vest percussion and flutter valve to help clear secretions  6. May potentially need bronchoscopy  7. Chest x-ray in a.m.         Electronically signed by Nir Donato MD on 4/1/2022 at 4:51 PM

## 2022-04-01 NOTE — PROGRESS NOTES
Department of Internal Medicine        CHIEF COMPLAINT: Shortness of breath    Reason for Admission: Left apical cavitary pneumonia    HISTORY OF PRESENT ILLNESS:      The patient is a 79 y.o. male who presents with being discharged back on 327 with the patient having a history of being admitted 3/23 with symptoms of shortness of breath for 2 weeks prior to that. Patient was so weak that he was not able to get out of bed 4 days before admission. Patient was tested positive for Covid with the patient never being vaccinated. Patient D-dimer was 534 on that admission. Patient white count was normal with patient having history of chronic hypoxic respiratory failure normally on 3 L nasal cannula from COPD. Patient stated he was feeling back to normal and adamantly request to be discharged home with his wife at the bedside. Patient's white count was normal and on 4 L nasal cannula at rest his O2 sat was 94-97%. Patient did need to increase his O2 to 6 L with activity. Patient stated that he was feeling better but progressively got more short of breath and came to the ED with a CAT scan showing new apical cavitary lesion since prior examination. Chest x-ray back on 3/23 showed a ill-defined patchy medial left upper lobe infiltrate. Patient currently on 15 L high flow nasal cannula with O2 sat 93%. Case discussed with infectious disease. Patient's wife is at the bedside and case discussed. 4/1/2022  Patient seen examined on [address]. Patient states he feels fairly good today and denies any pain. Patient very very short of breath with any activity. BUN/creatinine 39/1.3 today with improvement from yesterday. WBC 11.0 with a hemoglobin of 13.4. Temperature is 97.9 with heart rate 75 blood pressure 99/58. O2 sat is 90% on heated high flow nasal cannula with FiO2 75% patient viral respiratory panel is positive again for COVID-19. Infectious disease, pulmonary note reviewed.     Past Medical History:    Past Medical History:   Diagnosis Date    COPD (chronic obstructive pulmonary disease) ([facility] [address]     Hypertension     Oxygen dependent     2 l doesnt use continuous     Past Surgical History:    Past Surgical History:   Procedure Laterality Date    HERNIA REPAIR      age 61   [facility] SHOULDER ARTHROSCOPY Right 9/9/2020    RIGHT SHOULDER ARTHROSCOPY, SUBACROMIAL DECOMPRESSION,  DEBRIDEMENT LABRIUM AND ROTATOR CUFF, CHONDROPLASTY (ARTHREX) performed by Kip Brush DO at [address]       Medications Prior to Admission:    @  Prior to Admission medications    Medication Sig Start Date End Date Taking?  Authorizing Provider   ipratropium-albuterol (DUONEB) 0.5-2.5 (3) MG/3ML SOLN nebulizer solution  11/16/21   Historical Provider, MD   zinc sulfate (ZINCATE) 220 (50 Zn) MG capsule Take 1 capsule by mouth daily  Patient taking differently: Take 100 mg by mouth daily  3/26/22   Addy Martins DO   ascorbic acid (VITAMIN C) 1000 MG tablet Take 1 tablet by mouth daily 3/26/22   Addy Martins DO   dexamethasone (DECADRON) 6 MG tablet Take 1 tablet by mouth daily (with breakfast) for 6 days 3/26/22 4/1/22  Addy Martins DO   budesonide (PULMICORT) 0.5 MG/2ML nebulizer suspension inhale 1 vial via nebulizer twice daily 12/16/21   Historical Provider, MD   formoterol (PERFOROMIST) 20 MCG/2ML nebulizer solution inhale 1 unit dose via nebulizer twice daily 12/16/21   Historical Provider, MD   lisinopril-hydroCHLOROthiazide (PRINZIDE;ZESTORETIC) 20-12.5 MG per tablet TAKE ONE TABLET BY MOUTH DAILY 11/12/21   Monse Givens DO   omeprazole (PRILOSEC) 20 MG delayed release capsule TAKE ONE CAPSULE BY MOUTH EVERY MORNING 5/27/21   Historical Provider, MD   fluticasone [facility]) 50 MCG/ACT nasal spray 2 sprays by Each Nostril route daily 12/21/20   INDIGO Joe - CNP   OXYGEN Inhale 2 L into the lungs intermittent    Historical Provider, MD   Cyanocobalamin (VITAMIN B 12 PO) Take 500 mcg by mouth daily     Historical Provider, MD   Cholecalciferol (VITAMIN D3) 50 MCG (2000 UT) TABS Take 2 tablets by mouth daily    Historical Provider, MD   albuterol sulfate  (90 Base) MCG/ACT inhaler Inhale 2 puffs into the lungs 4 times daily as needed for Wheezing 19   Jacqueline Golden DO       Allergies:  Penicillins    Social History:   Social History     Socioeconomic History    Marital status:      Spouse name: Not on file    Number of children: Not on file    Years of education: Not on file    Highest education level: Not on file   Occupational History    Not on file   Tobacco Use    Smoking status: Former Smoker     Packs/day: 1.00     Years: 60.00     Pack years: 60.00     Quit date: 2016     Years since quittin.3    Smokeless tobacco: Never Used   Vaping Use    Vaping Use: Never used   Substance and Sexual Activity    Alcohol use: No    Drug use: No    Sexual activity: Not on file   Other Topics Concern    Not on file   Social History Narrative    Not on file     Social Determinants of Health     Financial Resource Strain: Low Risk     Difficulty of Paying Living Expenses: Not hard at all   Food Insecurity: No Food Insecurity    Worried About [facility] in the Last Year: Never true    [facility] in the Last Year: Never true   Transportation Needs: No Transportation Needs    Lack of Transportation (Medical): No    Lack of Transportation (Non-Medical):  No   Physical Activity:     Days of Exercise per Week: Not on file    Minutes of Exercise per Session: Not on file   Stress:     Feeling of Stress : Not on file   Social Connections:     Frequency of Communication with Friends and Family: Not on file    Frequency of Social Gatherings with Friends and Family: Not on file    Attends Judaism Services: Not on file    Active Member of Clubs or Organizations: Not on file    Attends Club or Organization Meetings: Not on file    Marital Status: Not on file   Intimate Partner Violence:     Fear of Current or Ex-Partner: Not on file    Emotionally Abused: Not on file    Physically Abused: Not on file    Sexually Abused: Not on file   Housing Stability:     Unable to Pay for Housing in the Last Year: Not on file    Number of Places Lived in the Last Year: Not on file    Unstable Housing in the Last Year: Not on file       Family History:   History reviewed. No pertinent family history. REVIEW OF SYSTEMS:    Gen: Patient denies any lightheadedness or dizziness. No LOC or syncope. No fevers or chills. HEENT: No earache, sore throat or nasal congestion. Resp: Denies cough, hemoptysis or sputum production. Cardiac: Denies chest pain,+ SOB,no diaphoresis or palpitations. GI: No nausea, vomiting, diarrhea or constipation. No melena or hematochezia. : No urinary complaints, dysuria, hematuria or frequency. MSK: No extremity weakness, paralysis or paresthesias. PHYSICAL EXAM:    Vitals:  BP (!) 99/58   Pulse 75   Temp 97.9 °F (36.6 °C)   Resp 24   Ht 5' 10.5\" (1.791 m)   Wt 171 lb 1.2 oz (77.6 kg)   SpO2 (!) 89%   BMI 24.20 kg/m²     General:  This is a 79 y.o. yo male who is alert and oriented in moderate respiratory distress  HEENT:  Head is normocephalic and atraumatic, PERRLA, EOMI, mucus membranes moist with no pharyngeal erythema or exudate. Neck:  Supple with no carotid bruits, JVD or thyromegaly.   No cervical adenopathy  CV:  Regular rate and rhythm, no murmurs  Lungs: Coarse decreased breath sounds to auscultation bilaterally with scattered crackles and no wheezes or rhonchi  Abdomen:  Soft, nontender, nondistended, bowel sounds present  Extremities:  No edema, peripheral pulses intact bilaterally  Neuro:  Cranial nerves II-XII grossly intact; motor and sensory function intact with no focal deficits  Skin:  No rashes, lesions or wounds    DATA:  CBC with Differential:    Lab Results   Component Value Date    WBC 11.0 04/01/2022    RBC 4.22 04/01/2022    HGB 13.4 04/01/2022    HCT 41.2 04/01/2022     04/01/2022    MCV 97.6 04/01/2022    MCH 31.8 04/01/2022    MCHC 32.5 04/01/2022    RDW 15.1 04/01/2022    LYMPHOPCT 4.1 04/01/2022    MONOPCT 0.9 04/01/2022    BASOPCT 0.1 04/01/2022    MONOSABS 0.11 04/01/2022    LYMPHSABS 0.00 04/01/2022    EOSABS 0.00 04/01/2022    BASOSABS 0.00 04/01/2022     CMP:    Lab Results   Component Value Date     04/01/2022    K 4.4 04/01/2022     04/01/2022    CO2 17 04/01/2022    BUN 39 04/01/2022    CREATININE 1.3 04/01/2022    GFRAA >60 04/01/2022    LABGLOM 53 04/01/2022    GLUCOSE 131 04/01/2022    PROT 5.7 04/01/2022    LABALBU 2.9 04/01/2022    CALCIUM 7.9 04/01/2022    BILITOT 0.4 04/01/2022    ALKPHOS 55 04/01/2022    AST 31 04/01/2022    ALT 40 04/01/2022     Magnesium:    Lab Results   Component Value Date    MG 2.0 03/24/2022     Phosphorus:  No results found for: PHOS  PT/INR:    Lab Results   Component Value Date    PROTIME 13.4 04/01/2022    INR 1.2 04/01/2022     Troponin:  No results found for: TROPONINI  U/A:    Lab Results   Component Value Date    COLORU DKYELLOW 03/24/2022    PROTEINU Negative 03/24/2022    PHUR 5.5 03/24/2022    WBCUA 1-3 03/24/2022    RBCUA NONE 03/24/2022    BACTERIA MANY 03/24/2022    CLARITYU Clear 03/24/2022    SPECGRAV >=1.030 03/24/2022    LEUKOCYTESUR Negative 03/24/2022    UROBILINOGEN 0.2 03/24/2022    BILIRUBINUR Negative 03/24/2022    BLOODU Negative 03/24/2022    GLUCOSEU Negative 03/24/2022     ABG:    Lab Results   Component Value Date    HCO3 19.6 03/31/2022    BE -2.1 03/31/2022    O2SAT 86.7 03/31/2022     HgBA1c:    Lab Results   Component Value Date    LABA1C 5.5 12/01/2021     FLP:    Lab Results   Component Value Date    TRIG 66 03/24/2022    HDL 45 03/24/2022    LDLCALC 110 03/24/2022    LABVLDL 13 03/24/2022     TSH:    Lab Results   Component Value Date    TSH 1.070 03/24/2022     IRON:  No results found for: IRON  LIPASE:  No results found for: LIPASE    ASSESSMENT AND PLAN:      Patient Active Problem List    Diagnosis Date Noted    Acute respiratory failure with hypoxia ([facility] [address] 03/31/2022    COPD exacerbation ([facility] [address] 03/23/2022    COVID-19 03/23/2022    [facility] 04/20/[facility]    Shoulder impingement, right 08/17/2020    Nontraumatic complete tear of right rotator cuff 08/17/2020     Impression:  1. Acute on chronic hypoxic respiratory failure  2. Left upper lobe cavitary pneumonia  3. History of COVID-19 infection March 23  4. History of COPD  5. History hypertension  6. Acute kidney injury  7. History of chronic kidney disease stage III  8. Oral thrush    Plan:  Admit to [facility]  Home medications reviewed  Monitor heart rate, blood pressure, O2 saturation  Consult ID  Consult pulmonology  Lovenox 40 mg subcu daily  General diet  Activity as tolerated  Heated high flow nasal cannula  IV fluids normal saline 20 KCl at 75 cc an hour  IV bolus normal saline 500 cc x 1  Hold Prinzide  IV Eraxis  IV Merrem  Decadron 6 mg IV push every 8 hours    CMP, CBC, D-dimer in a.m.     Fahad Garcia DO, D.OKimberlyn  4/1/2022  10:47 AM

## 2022-04-01 NOTE — CARE COORDINATION
SS Note: Covid positive last admit 3/23/22 and today 3/31 Covid positive. Pt on [address] 60 liters, FiO2 67%. Wife stated plan is for pt to return home upon discharge and she will transport home. [facility], [facility] is [facility] and they will continue to follow, will need [facility] orders prior to discharge.   Electronically signed by NIDHI Nair on 4/1/2022 at 12:42 PM

## 2022-04-02 NOTE — PLAN OF CARE
Problem: Falls - Risk of:  Goal: Will remain free from falls  Description: Will remain free from falls  4/2/2022 0803 by Yahir Pressley RN  Outcome: Met This Shift  4/1/2022 1839 by Yesica Quintana RN  Outcome: Ongoing  Goal: Absence of physical injury  Description: Absence of physical injury  4/2/2022 0803 by Yahir Pressley RN  Outcome: Met This Shift  4/1/2022 1839 by Yesica Quintana RN  Outcome: Ongoing     Problem: Airway Clearance - Ineffective  Goal: Achieve or maintain patent airway  Outcome: Met This Shift     Problem: Gas Exchange - Impaired  Goal: Absence of hypoxia  4/2/2022 0803 by Yahir Pressley RN  Outcome: Met This Shift  4/1/2022 1839 by Yesica Quintana RN  Outcome: Ongoing  Goal: Promote optimal lung function  Outcome: Met This Shift     Problem: Breathing Pattern - Ineffective  Goal: Ability to achieve and maintain a regular respiratory rate  4/2/2022 0803 by Yahir Pressley RN  Outcome: Met This Shift  4/1/2022 1839 by Yesica Quintana RN  Outcome: Ongoing     Problem:  Body Temperature -  Risk of, Imbalanced  Goal: Ability to maintain a body temperature within defined limits  Outcome: Met This Shift  Goal: Will regain or maintain usual level of consciousness  Outcome: Met This Shift  Goal: Complications related to the disease process, condition or treatment will be avoided or minimized  Outcome: Met This Shift     Problem: Isolation Precautions - Risk of Spread of Infection  Goal: Prevent transmission of infection  4/2/2022 0803 by Yahir Pressley RN  Outcome: Met This Shift  4/1/2022 1839 by Yesica Quintana RN  Outcome: Ongoing     Problem: Nutrition Deficits  Goal: Optimize nutritional status  Outcome: Met This Shift     Problem: Risk for Fluid Volume Deficit  Goal: Maintain normal heart rhythm  Outcome: Met This Shift  Goal: Maintain absence of muscle cramping  Outcome: Met This Shift  Goal: Maintain normal serum potassium, sodium, calcium, phosphorus, and pH  Outcome: Met This Shift     Problem: Loneliness or Risk for Loneliness  Goal: Demonstrate positive use of time alone when socialization is not possible  Outcome: Met This Shift     Problem: Fatigue  Goal: Verbalize increase energy and improved vitality  4/2/2022 0803 by Amanda Lynn RN  Outcome: Met This Shift  4/1/2022 1839 by Lazarus Mattock, RN  Outcome: Ongoing     Problem: Patient Education: Go to Patient Education Activity  Goal: Patient/Family Education  Outcome: Met This Shift     Problem: Skin Integrity:  Goal: Will show no infection signs and symptoms  Description: Will show no infection signs and symptoms  4/2/2022 0803 by Amanda Lynn RN  Outcome: Met This Shift  4/1/2022 1839 by Lazarus Mattock, RN  Outcome: Ongoing  Goal: Absence of new skin breakdown  Description: Absence of new skin breakdown  Outcome: Met This Shift

## 2022-04-02 NOTE — PROGRESS NOTES
Pharmacy Consultation Note  (Antibiotic Dosing and Monitoring)    Initial consult date: 3/31/22  Consulting physician/provider: Dr Jean-Paul Kurtz  Drug: Vancomycin  Indication: Pneumonia    Age/  Gender Height Weight IBW  Allergy Information   81 y.o./male 5' 10.5\" (179.1 cm) 180 lb (81.6 kg)     Ideal body weight: 74.1 kg (163 lb 7.5 oz)  Adjusted ideal body weight: 75.5 kg (166 lb 8.2 oz)   Penicillins      Renal Function:  Recent Labs     03/31/22  0215 04/01/22  0503 04/02/22  0656   BUN 43* 39* 32*   CREATININE 1.9* 1.3* 1.1       Intake/Output Summary (Last 24 hours) at 4/2/2022 1151  Last data filed at 4/2/2022 0830  Gross per 24 hour   Intake 2206.17 ml   Output 600 ml   Net 1606.17 ml       Vancomycin Monitoring:  Trough:  No results for input(s): VANCOTROUGH in the last 72 hours. Random:    Recent Labs     04/01/22  0503   VANCORANDOM 8.6       Vancomycin Administration Times:  Recent vancomycin administrations                   vancomycin (VANCOCIN) 1,000 mg in dextrose 5 % 250 mL IVPB (mg) 1,000 mg New Bag 03/31/22 0619                Assessment:  · Patient is a 79 y.o. male who has been initiated on vancomycin  Estimated Creatinine Clearance: 54 mL/min (based on SCr of 1.1 mg/dL). · To dose vancomycin, pharmacy will be utilizing dosing based off of levels because of patient's renal impairment/insufficiency    Plan:  · Vancomycin 1000 mg IV Q18H  · Trough tomorrow @ 0230;  Hold dose if level > 20 mcg/mL  · Will continue to monitor renal function   · Clinical pharmacy to follow      Kaylee King, PharmD 4/2/2022 11:51 AM   [phone]

## 2022-04-02 NOTE — PROGRESS NOTES
Department of Internal Medicine        CHIEF COMPLAINT: Shortness of breath    Reason for Admission: Left apical cavitary pneumonia    HISTORY OF PRESENT ILLNESS:      The patient is a 79 y.o. male who presents with being discharged back on 327 with the patient having a history of being admitted 3/23 with symptoms of shortness of breath for 2 weeks prior to that. Patient was so weak that he was not able to get out of bed 4 days before admission. Patient was tested positive for Covid with the patient never being vaccinated. Patient D-dimer was 534 on that admission. Patient white count was normal with patient having history of chronic hypoxic respiratory failure normally on 3 L nasal cannula from COPD. Patient stated he was feeling back to normal and adamantly request to be discharged home with his wife at the bedside. Patient's white count was normal and on 4 L nasal cannula at rest his O2 sat was 94-97%. Patient did need to increase his O2 to 6 L with activity. Patient stated that he was feeling better but progressively got more short of breath and came to the ED with a CAT scan showing new apical cavitary lesion since prior examination. Chest x-ray back on 3/23 showed a ill-defined patchy medial left upper lobe infiltrate. Patient currently on 15 L high flow nasal cannula with O2 sat 93%. Case discussed with infectious disease. Patient's wife is at the bedside and case discussed. 4/1/2022  Patient seen examined on [facility]. Patient states he feels fairly good today and denies any pain. Patient very very short of breath with any activity. BUN/creatinine 39/1.3 today with improvement from yesterday. WBC 11.0 with a hemoglobin of 13.4. Temperature is 97.9 with heart rate 75 blood pressure 99/58. O2 sat is 90% on heated high flow nasal cannula with FiO2 75% patient viral respiratory panel is positive again for COVID-19. Infectious disease, pulmonary note reviewed.     4/2/2022   Patient seen examined on [address] 60 patient is wife at the bedside and case discussed. Patient sitting up in chair currently. Patient states he feels a little bit better. Patient denies any chest or abdominal pain. He still has a nonproductive cough. Patient is using vest therapy and is tolerating it fairly well. BUN/creatinine 32/1.1 today with CRP improved 2.2. WBC 11.7 hemoglobin 12.8. Temperature 97.6 with heart rate of 56 and blood pressure 102/60. O2 sat 94% on heated high flow nasal cannula with FiO2 of 60%. Urine output is good. Past Medical History:    Past Medical History:   Diagnosis Date    COPD (chronic obstructive pulmonary disease) ([facility] [address]     Hypertension     Oxygen dependent     2 l doesnt use continuous     Past Surgical History:    Past Surgical History:   Procedure Laterality Date    HERNIA REPAIR      age 61   Micky Loser SHOULDER ARTHROSCOPY Right 9/9/2020    RIGHT SHOULDER ARTHROSCOPY, SUBACROMIAL DECOMPRESSION,  DEBRIDEMENT LABRIUM AND ROTATOR CUFF, CHONDROPLASTY (ARTHREX) performed by Fide Ascencio DO at [address]       Medications Prior to Admission:    @  Prior to Admission medications    Medication Sig Start Date End Date Taking?  Authorizing Provider   ipratropium-albuterol (DUONEB) 0.5-2.5 (3) MG/3ML SOLN nebulizer solution  11/16/21   Historical Provider, MD   zinc sulfate (ZINCATE) 220 (50 Zn) MG capsule Take 1 capsule by mouth daily  Patient taking differently: Take 100 mg by mouth daily  3/26/22   Patricia Bello DO   ascorbic acid (VITAMIN C) 1000 MG tablet Take 1 tablet by mouth daily 3/26/22   Patricia Bello DO   budesonide (PULMICORT) 0.5 MG/2ML nebulizer suspension inhale 1 vial via nebulizer twice daily 12/16/21   Historical Provider, MD   formoterol (PERFOROMIST) 20 MCG/2ML nebulizer solution inhale 1 unit dose via nebulizer twice daily 12/16/21   Historical Provider, MD   lisinopril-hydroCHLOROthiazide (PRINZIDE;ZESTORETIC) 20-12.5 MG per tablet TAKE ONE TABLET BY MOUTH DAILY 11/12/21 Tk Howard DO   omeprazole (PRILOSEC) 20 MG delayed release capsule TAKE ONE CAPSULE BY MOUTH EVERY MORNING 21   Historical Provider, MD   fluticasone [facility]) 50 MCG/ACT nasal spray 2 sprays by Each Nostril route daily 20   Crissy Ogden, APRN - CNP   OXYGEN Inhale 2 L into the lungs intermittent    Historical Provider, MD   Cyanocobalamin (VITAMIN B 12 PO) Take 500 mcg by mouth daily     Historical Provider, MD   Cholecalciferol (VITAMIN D3) 50 MCG ( UT) TABS Take 2 tablets by mouth daily    Historical Provider, MD   albuterol sulfate  (90 Base) MCG/ACT inhaler Inhale 2 puffs into the lungs 4 times daily as needed for Wheezing 19   Tk Howard DO       Allergies:  Penicillins    Social History:   Social History     Socioeconomic History    Marital status:      Spouse name: Not on file    Number of children: Not on file    Years of education: Not on file    Highest education level: Not on file   Occupational History    Not on file   Tobacco Use    Smoking status: Former Smoker     Packs/day: 1.00     Years: 60.00     Pack years: 60.00     Quit date: 2016     Years since quittin.3    Smokeless tobacco: Never Used   Vaping Use    Vaping Use: Never used   Substance and Sexual Activity    Alcohol use: No    Drug use: No    Sexual activity: Not on file   Other Topics Concern    Not on file   Social History Narrative    Not on file     Social Determinants of Health     Financial Resource Strain: Low Risk     Difficulty of Paying Living Expenses: Not hard at all   Food Insecurity: No Food Insecurity    Worried About [address] in the Last Year: Never true    [facility] in the Last Year: Never true   Transportation Needs: No Transportation Needs    Lack of Transportation (Medical): No    Lack of Transportation (Non-Medical):  No   Physical Activity:     Days of Exercise per Week: Not on file    Minutes of Exercise per Session: Not on file   Stress:     Feeling of Stress : Not on file   Social Connections:     Frequency of Communication with Friends and Family: Not on file    Frequency of Social Gatherings with Friends and Family: Not on file    Attends Cheondoism Services: Not on file    Active Member of Clubs or Organizations: Not on file    Attends Club or Organization Meetings: Not on file    Marital Status: Not on file   Intimate Partner Violence:     Fear of Current or Ex-Partner: Not on file    Emotionally Abused: Not on file    Physically Abused: Not on file    Sexually Abused: Not on file   Housing Stability:     Unable to Pay for Housing in the Last Year: Not on file    Number of [place] in the Last Year: Not on file    Unstable Housing in the Last Year: Not on file       Family History:   History reviewed. No pertinent family history. REVIEW OF SYSTEMS:    Gen: Patient denies any lightheadedness or dizziness. No LOC or syncope. No fevers or chills. HEENT: No earache, sore throat or nasal congestion. Resp: Denies cough, hemoptysis or sputum production. Cardiac: Denies chest pain,+ SOB,no diaphoresis or palpitations. GI: No nausea, vomiting, diarrhea or constipation. No melena or hematochezia. : No urinary complaints, dysuria, hematuria or frequency. MSK: No extremity weakness, paralysis or paresthesias. PHYSICAL EXAM:    Vitals:  /60   Pulse 56   Temp 97.6 °F (36.4 °C) (Infrared)   Resp 27   Ht 5' 10.5\" (1.791 m)   Wt 171 lb 1.2 oz (77.6 kg)   SpO2 94%   BMI 24.20 kg/m²     General:  This is a 79 y.o. yo male who is alert and oriented in moderate respiratory distress  HEENT:  Head is normocephalic and atraumatic, PERRLA, EOMI, mucus membranes moist with no pharyngeal erythema or exudate. Neck:  Supple with no carotid bruits, JVD or thyromegaly.   No cervical adenopathy  CV:  Regular rate and rhythm, no murmurs  Lungs: Coarse decreased breath sounds to auscultation bilaterally with scattered crackles and no wheezes or rhonchi  Abdomen:  Soft, nontender, nondistended, bowel sounds present  Extremities:  No edema, peripheral pulses intact bilaterally  Neuro:  Cranial nerves II-XII grossly intact; motor and sensory function intact with no focal deficits  Skin:  No rashes, lesions or wounds    DATA:  CBC with Differential:    Lab Results   Component Value Date    WBC 11.7 04/02/2022    RBC 4.09 04/02/2022    HGB 12.8 04/02/2022    HCT 40.5 04/02/2022     04/02/2022    MCV 99.0 04/02/2022    MCH 31.3 04/02/2022    MCHC 31.6 04/02/2022    RDW 14.9 04/02/2022    LYMPHOPCT 2.7 04/02/2022    MONOPCT 2.6 04/02/2022    BASOPCT 0.2 04/02/2022    MONOSABS 0.30 04/02/2022    LYMPHSABS 0.32 04/02/2022    EOSABS 0.00 04/02/2022    BASOSABS 0.02 04/02/2022     CMP:    Lab Results   Component Value Date     04/02/2022    K 4.6 04/02/2022     04/02/2022    CO2 19 04/02/2022    BUN 32 04/02/2022    CREATININE 1.1 04/02/2022    GFRAA >60 04/02/2022    LABGLOM >60 04/02/2022    GLUCOSE 114 04/02/2022    PROT 5.6 04/02/2022    LABALBU 2.9 04/02/2022    CALCIUM 7.9 04/02/2022    BILITOT 0.4 04/02/2022    ALKPHOS 53 04/02/2022    AST 30 04/02/2022    ALT 37 04/02/2022     Magnesium:    Lab Results   Component Value Date    MG 2.0 03/24/2022     Phosphorus:  No results found for: PHOS  PT/INR:    Lab Results   Component Value Date    PROTIME 13.4 04/01/2022    INR 1.2 04/01/2022     Troponin:  No results found for: TROPONINI  U/A:    Lab Results   Component Value Date    COLORU DKYELLOW 03/24/2022    PROTEINU Negative 03/24/2022    PHUR 5.5 03/24/2022    WBCUA 1-3 03/24/2022    RBCUA NONE 03/24/2022    BACTERIA MANY 03/24/2022    CLARITYU Clear 03/24/2022    SPECGRAV >=1.030 03/24/2022    LEUKOCYTESUR Negative 03/24/2022    UROBILINOGEN 0.2 03/24/2022    BILIRUBINUR Negative 03/24/2022    BLOODU Negative 03/24/2022    GLUCOSEU Negative 03/24/2022     ABG:    Lab Results   Component Value Date    HCO3 19.6 03/31/2022    BE -2.1 03/31/2022    O2SAT 86.7 03/31/2022     HgBA1c:    Lab Results   Component Value Date    LABA1C 5.5 12/01/2021     FLP:    Lab Results   Component Value Date    TRIG 66 03/24/2022    HDL 45 03/24/2022    LDLCALC 110 03/24/2022    LABVLDL 13 03/24/2022     TSH:    Lab Results   Component Value Date    TSH 1.070 03/24/2022     IRON:  No results found for: IRON  LIPASE:  No results found for: LIPASE    ASSESSMENT AND PLAN:      Patient Active Problem List    Diagnosis Date Noted    Acute respiratory failure with hypoxia ([facility] [address] 03/31/2022    COPD exacerbation ([facility] [address] 03/23/2022    COVID-19 03/23/2022    Chronic bronchitis ([facility] [address] 04/20/2021    Shoulder impingement, right 08/17/2020    Nontraumatic complete tear of right rotator cuff 08/17/2020     Impression:  1. Acute on chronic hypoxic respiratory failure  2. Left upper lobe infected bleb- pneumonia  3. History of COVID-19 infection March 23  4. History of COPD  5. History hypertension  6. Acute kidney injury  7. History of chronic kidney disease stage III  8. Oral thrush    Plan:  Admit to [facility]  Home medications reviewed  Monitor heart rate, blood pressure, O2 saturation  Consult ID  Consult pulmonology  Lovenox 40 mg subcu daily  General diet  Activity as tolerated  Heated high flow nasal cannula  IV fluids normal saline 20 KCl at 75 cc an hour  IV bolus normal saline 500 cc x 1  Hold Prinzide  IV Eraxis  IV Merrem  Decadron 6 mg IV push every 8 hours  Incentive spirometry with flutter valve  Chest vest percussion    CMP, CBC, D-dimer in a.m.     Eduardo Ramirez DO, D.OKimberlyn  4/2/2022  10:44 AM

## 2022-04-02 NOTE — PROGRESS NOTES
PULMONARY/ CRITICAL CARE MEDICINE FOLLOW UP    79year old person with PMH as described below admitted to hospital for management of COVID-19 with left upper lobe pneumonia, severe COPD, suspect inspissated secretions. --Mr Facundo Garcia has severe hypoxemia and requires Vapotherm 60L and 60% FiO2  --Receiving treatment with meropenem, vancomycin, anidulafungin    /60   Pulse 56   Temp 97.6 °F (36.4 °C) (Infrared)   Resp 27   Ht 5' 10.5\" (1.791 m)   Wt 171 lb 1.2 oz (77.6 kg)   SpO2 94%   BMI 24.20 kg/m²   General: Awake, oriented to place and person  HEENT: No head lesions, PERRL, EOMI, mouth without lesions, no nasal lesions, no cervical adenopathy palpated  Respiratory: Lungs with diminished breath sounds bilaterally, no adventitious sounds auscultated, no accessory muscle use  CV: Regular rate, no murmurs, no JVD, 1+leg edema  Abdomen: Soft, non tender, + bowel sounds, no lesions  Skin: Hydrated, adequate turgor, no rash, capillary refill <2 seconds  Extremities: Muscular strength 3/5 in 4 limbs, moves 4 limbs spontaneously, distal pulses present  Neurology: Awake and alert, follows commands, moves 4 limbs on command and spontaneously, neck is supple, no meningitic signs present. A/P:  Acute on chronic hypoxemic respiratory failure secondary to left upper lobe infected bleb vs tumor, COVID-19 pneumonia in a patient with COPD  --Oxygen supplementation to maintain sats > 89%, can use Vapotherm or BPAP 12/6 if worsening  --Antimicrobial regimen: meropenem/vancomycin/anidulafungin as per ID  --Antiviral regimen: DexamethasoneX 10 days  --Cultures reviewed  --Continue bronchodilators  --Anticoagulation: Enoxaparin SQ  --Airway clearance:  1) Incentive spirometry and PEP valve 10 times per hour while awake    2) Out of bed and to chair as much as possible  3) VEST therapy 4 times/day  --The patient needs follow up CT in 4-6 weeks to ensure resolution of left upper lobe lesion vs biopsy.      Rest of supportive care as per primary team    Hypertension  --On antihypertensives     MEDICATIONS:   vancomycin  1,000 mg IntraVENous Q18H    anidulafungin  100 mg IntraVENous Q24H    meropenem  1,000 mg IntraVENous Q12H    vitamin B-6  50 mg Oral Daily    thiamine mononitrate  100 mg Oral Daily    vitamin E  400 Units Oral Daily    melatonin  5 mg Oral Nightly    Vitamin D  4,000 Units Oral Daily    fluticasone  2 spray Each Nostril Daily    pantoprazole  40 mg Oral QAM AC    zinc sulfate  50 mg Oral Daily    ascorbic acid  1,000 mg Oral Daily    enoxaparin  30 mg SubCUTAneous Daily    dexamethasone  6 mg IntraVENous Q8H    ipratropium-albuterol  1 ampule Inhalation Q4H    budesonide  0.5 mg Nebulization BID      sodium chloride Stopped (03/31/22 1436)    0.9% NaCl with KCl 20 mEq 75 mL/hr at 04/02/22 0712     prochlorperazine, acetaminophen    OBJECTIVE:  Vitals:    04/02/22 1015   BP:    Pulse:    Resp: 27   Temp:    SpO2: 94%     FiO2 : 60 %  O2 Flow Rate (L/min): 60 L/min  O2 Device: Heated high flow cannula        LABS:  WBC   Date Value Ref Range Status   04/02/2022 11.7 (H) 4.5 - 11.5 E9/L Final   04/01/2022 11.0 4.5 - 11.5 E9/L Final   03/31/2022 6.8 4.5 - 11.5 E9/L Final     Hemoglobin   Date Value Ref Range Status   04/02/2022 12.8 12.5 - 16.5 g/dL Final   04/01/2022 13.4 12.5 - 16.5 g/dL Final   03/31/2022 16.3 12.5 - 16.5 g/dL Final     Hematocrit   Date Value Ref Range Status   04/02/2022 40.5 37.0 - 54.0 % Final   04/01/2022 41.2 37.0 - 54.0 % Final   03/31/2022 50.2 37.0 - 54.0 % Final     MCV   Date Value Ref Range Status   04/02/2022 99.0 80.0 - 99.9 fL Final   04/01/2022 97.6 80.0 - 99.9 fL Final   03/31/2022 95.6 80.0 - 99.9 fL Final     Platelets   Date Value Ref Range Status   04/02/2022 294 130 - 450 E9/L Final   04/01/2022 291 130 - 450 E9/L Final   03/31/2022 333 130 - 450 E9/L Final     Sodium   Date Value Ref Range Status   04/02/2022 136 132 - 146 mmol/L Final   04/01/2022 139 132 - 146 mmol/L Final   03/31/2022 137 132 - 146 mmol/L Final     Potassium   Date Value Ref Range Status   04/02/2022 4.6 3.5 - 5.0 mmol/L Final   04/01/2022 4.4 3.5 - 5.0 mmol/L Final   03/31/2022 3.8 3.5 - 5.0 mmol/L Final     Chloride   Date Value Ref Range Status   04/02/2022 108 (H) 98 - 107 mmol/L Final   04/01/2022 109 (H) 98 - 107 mmol/L Final   03/31/2022 100 98 - 107 mmol/L Final     CO2   Date Value Ref Range Status   04/02/2022 19 (L) 22 - 29 mmol/L Final   04/01/2022 17 (L) 22 - 29 mmol/L Final   03/31/2022 22 22 - 29 mmol/L Final     BUN   Date Value Ref Range Status   04/02/2022 32 (H) 6 - 23 mg/dL Final   04/01/2022 39 (H) 6 - 23 mg/dL Final   03/31/2022 43 (H) 6 - 23 mg/dL Final     CREATININE   Date Value Ref Range Status   04/02/2022 1.1 0.7 - 1.2 mg/dL Final   04/01/2022 1.3 (H) 0.7 - 1.2 mg/dL Final   03/31/2022 1.9 (H) 0.7 - 1.2 mg/dL Final     Glucose   Date Value Ref Range Status   04/02/2022 114 (H) 74 - 99 mg/dL Final   04/01/2022 131 (H) 74 - 99 mg/dL Final   03/31/2022 119 (H) 74 - 99 mg/dL Final     Calcium   Date Value Ref Range Status   04/02/2022 7.9 (L) 8.6 - 10.2 mg/dL Final   04/01/2022 7.9 (L) 8.6 - 10.2 mg/dL Final   03/31/2022 8.9 8.6 - 10.2 mg/dL Final     Total Protein   Date Value Ref Range Status   04/02/2022 5.6 (L) 6.4 - 8.3 g/dL Final   04/01/2022 5.7 (L) 6.4 - 8.3 g/dL Final   03/27/2022 6.6 6.4 - 8.3 g/dL Final     Albumin   Date Value Ref Range Status   04/02/2022 2.9 (L) 3.5 - 5.2 g/dL Final   04/01/2022 2.9 (L) 3.5 - 5.2 g/dL Final   03/27/2022 3.3 (L) 3.5 - 5.2 g/dL Final     Total Bilirubin   Date Value Ref Range Status   04/02/2022 0.4 0.0 - 1.2 mg/dL Final   04/01/2022 0.4 0.0 - 1.2 mg/dL Final   03/27/2022 0.4 0.0 - 1.2 mg/dL Final     Alkaline Phosphatase   Date Value Ref Range Status   04/02/2022 53 40 - 129 U/L Final   04/01/2022 55 40 - 129 U/L Final   03/27/2022 63 40 - 129 U/L Final     AST   Date Value Ref Range Status   04/02/2022 30 0 - 39 U/L Final 04/01/2022 31 0 - 39 U/L Final   03/27/2022 54 (H) 0 - 39 U/L Final     ALT   Date Value Ref Range Status   04/02/2022 37 0 - 40 U/L Final   04/01/2022 40 0 - 40 U/L Final   03/27/2022 45 (H) 0 - 40 U/L Final     GFR Non-   Date Value Ref Range Status   04/02/2022 >60 >=60 mL/min/1.73 Final     Comment:     Chronic Kidney Disease: less than 60 ml/min/1.73 sq.m. Kidney Failure: less than 15 ml/min/1.73 sq.m. Results valid for patients 18 years and older. 04/01/2022 53 >=60 mL/min/1.73 Final     Comment:     Chronic Kidney Disease: less than 60 ml/min/1.73 sq.m. Kidney Failure: less than 15 ml/min/1.73 sq.m. Results valid for patients 18 years and older. 03/31/2022 34 >=60 mL/min/1.73 Final     Comment:     Chronic Kidney Disease: less than 60 ml/min/1.73 sq.m. Kidney Failure: less than 15 ml/min/1.73 sq.m. Results valid for patients 18 years and older. GFR    Date Value Ref Range Status   04/02/2022 >60  Final   04/01/2022 >60  Final   03/31/2022 41  Final     Magnesium   Date Value Ref Range Status   03/24/2022 2.0 1.6 - 2.6 mg/dL Final   03/23/2022 1.9 1.6 - 2.6 mg/dL Final     No results found for: PHOS  Recent Labs     03/31/22  0251   HCO3 19.6*   BE -2.1   O2SAT 86.7*     RADIOLOGY:  XR CHEST PORTABLE   Final Result   1. No signs of an acute cardiopulmonary process   2. Coarsened interstitial markings compatible pulmonary fibrotic disease         CTA CHEST W CONTRAST   Final Result   No evidence of pulmonary embolism. Left apical cavitary lesion, new since the prior examination. Differential   includes infection/cavitating pneumonia including tuberculosis, particularly   given apical location. Underlying malignancy is not excluded. Clinical   correlation recommended. PROBLEM LIST:  Principal Problem:    Acute respiratory failure with hypoxia (HCC)  Resolved Problems:    * No resolved hospital problems.  Bianca Kovacs Danie SUN  Pulmonary and Critical Care Medicine

## 2022-04-02 NOTE — PROGRESS NOTES
04/02/22 1016   Treatment   Treatment Type Vest   $Bronchial Hygiene $Oscillatory therapy    Patient sitting up. Tolerated vest therapy well.

## 2022-04-02 NOTE — PROGRESS NOTES
[address] [place] [address] [facility]  Progress Note    CC: COVID   Face to face encounter   SUBJECTIVE:  In bed HFNC feels wll no c/o   Patient is tolerating medications. No reported adverse drug reactions. ROS: No nausea, vomiting, diarrhea. Has been afebrile. Medications:  Scheduled Meds:   vancomycin  1,000 mg IntraVENous Q18H    anidulafungin  100 mg IntraVENous Q24H    meropenem  1,000 mg IntraVENous Q12H    vitamin B-6  50 mg Oral Daily    thiamine mononitrate  100 mg Oral Daily    vitamin E  400 Units Oral Daily    melatonin  5 mg Oral Nightly    Vitamin D  4,000 Units Oral Daily    fluticasone  2 spray Each Nostril Daily    pantoprazole  40 mg Oral QAM AC    zinc sulfate  50 mg Oral Daily    ascorbic acid  1,000 mg Oral Daily    enoxaparin  30 mg SubCUTAneous Daily    dexamethasone  6 mg IntraVENous Q8H    ipratropium-albuterol  1 ampule Inhalation Q4H    budesonide  0.5 mg Nebulization BID     Continuous Infusions:   sodium chloride Stopped (03/31/22 1436)    0.9% NaCl with KCl 20 mEq 75 mL/hr at 04/02/22 0712     PRN Meds:prochlorperazine, acetaminophen  OBJECTIVE:  Patient Vitals for the past 24 hrs:   BP Temp Temp src Pulse Resp SpO2   04/02/22 1400 115/66 98.1 °F (36.7 °C) Infrared 64 22    04/02/22 1015     27 94 %   04/02/22 0846      92 %   04/02/22 0844      (!) 85 %   04/02/22 0842      (!) 83 %   04/02/22 0840      (!) 79 %   04/02/22 0830 102/60 97.6 °F (36.4 °C) Infrared 56 20 93 %   04/02/22 0652     20 94 %   04/02/22 0515      97 %   04/02/22 0500    55 24 99 %   04/02/22 0245 101/65 97.9 °F (36.6 °C) Infrared 60 24 98 %   04/02/22 0123      99 %   04/01/22 2315 110/72 98 °F (36.7 °C) Axillary 55 22 97 %   04/01/22 2209      99 %     Constitutional:   On high flow    Skin: Warm and dry. Head: at/nc  Neck: Supple to movements. No lymphadenopathy. Chest: minimal use of accessory muscles to breathe. Auscultation reveals some expiratory wheezes noted. On high flow nasal canula. Cardiovascular: S1 and S2 are rhythmic and regular. Abdomen: Positive bowel sounds to auscultation. Benign to palpation. Extremities:  , + edema.   PIV    Laboratory and Tests Review:  Lab Results   Component Value Date    WBC 11.7 (H) 04/02/2022    WBC 11.0 04/01/2022    WBC 6.8 03/31/2022    HGB 12.8 04/02/2022    HCT 40.5 04/02/2022    MCV 99.0 04/02/2022     04/02/2022     Lab Results   Component Value Date    NEUTROABS 10.85 (H) 04/02/2022    NEUTROABS 10.89 (H) 04/01/2022    NEUTROABS 3.87 03/27/2022     Lab Results   Component Value Date    CRP 2.2 (H) 04/02/2022    CRP 4.4 (H) 04/01/2022    CRP 2.6 (H) 03/27/2022     Lab Results   Component Value Date    SEDRATE 28 (H) 04/01/2022     Lab Results   Component Value Date    ALT 37 04/02/2022    AST 30 04/02/2022    ALKPHOS 53 04/02/2022    BILITOT 0.4 04/02/2022     Lab Results   Component Value Date     04/02/2022    K 4.6 04/02/2022     04/02/2022    CO2 19 04/02/2022    BUN 32 04/02/2022    CREATININE 1.1 04/02/2022    GFRAA >60 04/02/2022    LABGLOM >60 04/02/2022    GLUCOSE 114 04/02/2022    PROT 5.6 04/02/2022    LABALBU 2.9 04/02/2022    CALCIUM 7.9 04/02/2022    BILITOT 0.4 04/02/2022    ALKPHOS 53 04/02/2022    AST 30 04/02/2022    ALT 37 04/02/2022      SARS-COV-2 biomarkers    Recent Labs     03/31/22  0215 03/31/22  0607 04/01/22  0503 04/02/22  0656   CRP  --   --  4.4* 2.2*   PROCAL  --  0.12*  --   --    FERRITIN  --   --  1,435  --    LDH  --   --  256*  --    DDIMER  --   --  786 630   FIBRINOGEN  --   --  428  --    INR 1.1  --  1.2  --    PROTIME 13.3*  --  13.4*  --    AST  --   --  31 30   ALT  --   --  40 37     Lab Results   Component Value Date    CHOL 168 03/24/2022    TRIG 66 03/24/2022    HDL 45 03/24/2022    LDLCALC 110 03/24/2022    LABVLDL 13 03/24/2022     Lab Results   Component Value Date/Time    VITD25 >120 (H) 03/31/2022 11:21 AM     Radiology:  Reviewed     Microbiology:   3/31/2022- blood cx- no growth to date   3/31/2022- respiratory panel- COVID++   Strep pneumo and legionella- pending     ASSESSMENT:  · COVID -19  · Cavitary lung lesion   · Unvaccinated  · History of COPD  · Hypoxia   · S/p treatment with Baricitinib     PLAN:  · Currently on vancomycin , meropenem and eraxis- will narrow down once cultures/ labs are back   · Labs pending-   · Check cultures ngtd  · Monitor labs- procal- 0.12   · Wife at bedside and updated   Monitor respiratory status-       Imaging and labs were reviewed. Thank you for involving me in the care of Ally Jarrell. Please do not hesitate to call for any questions or concerns.     Electronically signed by Yefri Leary MD on 4/2/2022 at 6:49 PM

## 2022-04-03 NOTE — PLAN OF CARE
Problem: Falls - Risk of:  Goal: Will remain free from falls  Description: Will remain free from falls  Outcome: Met This Shift     Problem: Falls - Risk of:  Goal: Absence of physical injury  Description: Absence of physical injury  Outcome: Met This Shift     Problem: Airway Clearance - Ineffective  Goal: Achieve or maintain patent airway  Outcome: Met This Shift     Problem: Gas Exchange - Impaired  Goal: Absence of hypoxia  Outcome: Met This Shift     Problem: Gas Exchange - Impaired  Goal: Promote optimal lung function  Outcome: Met This Shift     Problem: Breathing Pattern - Ineffective  Goal: Ability to achieve and maintain a regular respiratory rate  Outcome: Met This Shift     Problem: Body Temperature -  Risk of, Imbalanced  Goal: Ability to maintain a body temperature within defined limits  Outcome: Met This Shift     Problem: Body Temperature -  Risk of, Imbalanced  Goal: Will regain or maintain usual level of consciousness  Outcome: Met This Shift     Problem:  Body Temperature -  Risk of, Imbalanced  Goal: Complications related to the disease process, condition or treatment will be avoided or minimized  Outcome: Met This Shift     Problem: Isolation Precautions - Risk of Spread of Infection  Goal: Prevent transmission of infection  Outcome: Met This Shift     Problem: Nutrition Deficits  Goal: Optimize nutritional status  Outcome: Met This Shift     Problem: Risk for Fluid Volume Deficit  Goal: Maintain normal heart rhythm  Outcome: Met This Shift     Problem: Risk for Fluid Volume Deficit  Goal: Maintain absence of muscle cramping  Outcome: Met This Shift     Problem: Risk for Fluid Volume Deficit  Goal: Maintain normal serum potassium, sodium, calcium, phosphorus, and pH  Outcome: Met This Shift     Problem: Loneliness or Risk for Loneliness  Goal: Demonstrate positive use of time alone when socialization is not possible  Outcome: Met This Shift     Problem: Fatigue  Goal: Verbalize increase energy and improved vitality  Outcome: Met This Shift     Problem: Patient Education: Go to Patient Education Activity  Goal: Patient/Family Education  Outcome: Met This Shift     Problem: Skin Integrity:  Goal: Absence of new skin breakdown  Description: Absence of new skin breakdown  Outcome: Met This Shift

## 2022-04-03 NOTE — PROGRESS NOTES
Pulmonary/Critical Care Progress Note    We are following patient for left upper lobe pneumonia/infected bleb, severe COPD, suspect inspissated secretions, positive test for COVID-19, hematuria    SUBJECTIVE:  The patient is about the same and is alternating BiPAP and air Vo at FiO2 95 to 100% with saturations usually in the low 90s. The patient has been deciding whether he wants to be intubated or not because it is important to understand that his work of breathing is still fairly high and he may require intubation if he wishes this. He is having some hematuria today and we will check a culture. Urology consultation has been called. Lovenox is being held currently. MEDICATIONS:   [START ON 4/4/2022] dexamethasone  6 mg IntraVENous Q12H    vancomycin  1,000 mg IntraVENous Q18H    anidulafungin  100 mg IntraVENous Q24H    meropenem  1,000 mg IntraVENous Q12H    vitamin B-6  50 mg Oral Daily    thiamine mononitrate  100 mg Oral Daily    vitamin E  400 Units Oral Daily    melatonin  5 mg Oral Nightly    fluticasone  2 spray Each Nostril Daily    pantoprazole  40 mg Oral QAM AC    zinc sulfate  50 mg Oral Daily    ascorbic acid  1,000 mg Oral Daily    [Held by provider] enoxaparin  30 mg SubCUTAneous Daily    ipratropium-albuterol  1 ampule Inhalation Q4H    budesonide  0.5 mg Nebulization BID      sodium chloride Stopped (03/31/22 1436)    0.9% NaCl with KCl 20 mEq 75 mL/hr at 04/03/22 1552     prochlorperazine, acetaminophen      REVIEW OF SYSTEMS:  Constitutional: Denies fever, weight loss, night sweats, and fatigue  Skin: Denies pigmentation, dark lesions, and rashes   HEENT: Denies hearing loss, tinnitus, ear drainage, epistaxis, sore throat, and hoarseness. Cardiovascular: Denies palpitations, chest pain, and chest pressure. Respiratory: Denies cough, dyspnea at rest, hemoptysis, apnea, and choking.   Gastrointestinal: Denies nausea, vomiting, poor appetite, diarrhea, heartburn or reflux  Genitourinary: Denies dysuria, frequency, urgency but admits to hematuria  Musculoskeletal: Denies myalgias, muscle weakness, and bone pain  Neurological: Denies dizziness, vertigo, headache, and focal weakness  Psychological: Denies anxiety and depression  Endocrine: Denies heat intolerance and cold intolerance  Hematopoietic/Lymphatic: Denies bleeding problems and blood transfusions    OBJECTIVE:  Vitals:    04/03/22 1600   BP:    Pulse:    Resp:    Temp:    SpO2: 91%     FiO2 : 75 %  O2 Flow Rate (L/min): 60 L/min  O2 Device: Heated high flow cannula    PHYSICAL EXAM:  Constitutional: No fever, chills, diaphoresis  Skin: No skin rash, no skin breakdown  HEENT: Unremarkable  Neck: No JVD, lymphadenopathy, thyromegaly  Cardiovascular: S1, S2 regular. No S3 murmurs rubs present  Respiratory: Very faint end expiratory wheeze, occasional inspiratory crackles, no pleural rubs  Gastrointestinal: Soft, flat, nontender  Genitourinary: No CVA tenderness  Extremities: No clubbing, cyanosis, or edema  Neurological: Awake, alert, oriented x3.   No evidence of focal motor or sensory deficits  Psychological: Appropriate affect    LABS:  WBC   Date Value Ref Range Status   04/03/2022 12.3 (H) 4.5 - 11.5 E9/L Final   04/02/2022 11.7 (H) 4.5 - 11.5 E9/L Final   04/01/2022 11.0 4.5 - 11.5 E9/L Final     Hemoglobin   Date Value Ref Range Status   04/03/2022 12.5 12.5 - 16.5 g/dL Final   04/02/2022 12.8 12.5 - 16.5 g/dL Final   04/01/2022 13.4 12.5 - 16.5 g/dL Final     Hematocrit   Date Value Ref Range Status   04/03/2022 39.0 37.0 - 54.0 % Final   04/02/2022 40.5 37.0 - 54.0 % Final   04/01/2022 41.2 37.0 - 54.0 % Final     MCV   Date Value Ref Range Status   04/03/2022 98.2 80.0 - 99.9 fL Final   04/02/2022 99.0 80.0 - 99.9 fL Final   04/01/2022 97.6 80.0 - 99.9 fL Final     Platelets   Date Value Ref Range Status   04/03/2022 304 130 - 450 E9/L Final   04/02/2022 294 130 - 450 E9/L Final   04/01/2022 291 130 - 450 E9/L Final     Sodium   Date Value Ref Range Status   04/03/2022 137 132 - 146 mmol/L Final   04/02/2022 136 132 - 146 mmol/L Final   04/01/2022 139 132 - 146 mmol/L Final     Potassium   Date Value Ref Range Status   04/03/2022 4.5 3.5 - 5.0 mmol/L Final   04/02/2022 4.6 3.5 - 5.0 mmol/L Final   04/01/2022 4.4 3.5 - 5.0 mmol/L Final     Chloride   Date Value Ref Range Status   04/03/2022 109 (H) 98 - 107 mmol/L Final   04/02/2022 108 (H) 98 - 107 mmol/L Final   04/01/2022 109 (H) 98 - 107 mmol/L Final     CO2   Date Value Ref Range Status   04/03/2022 18 (L) 22 - 29 mmol/L Final   04/02/2022 19 (L) 22 - 29 mmol/L Final   04/01/2022 17 (L) 22 - 29 mmol/L Final     BUN   Date Value Ref Range Status   04/03/2022 30 (H) 6 - 23 mg/dL Final   04/02/2022 32 (H) 6 - 23 mg/dL Final   04/01/2022 39 (H) 6 - 23 mg/dL Final     CREATININE   Date Value Ref Range Status   04/03/2022 1.1 0.7 - 1.2 mg/dL Final   04/02/2022 1.1 0.7 - 1.2 mg/dL Final   04/01/2022 1.3 (H) 0.7 - 1.2 mg/dL Final     Glucose   Date Value Ref Range Status   04/03/2022 111 (H) 74 - 99 mg/dL Final   04/02/2022 114 (H) 74 - 99 mg/dL Final   04/01/2022 131 (H) 74 - 99 mg/dL Final     Calcium   Date Value Ref Range Status   04/03/2022 7.7 (L) 8.6 - 10.2 mg/dL Final   04/02/2022 7.9 (L) 8.6 - 10.2 mg/dL Final   04/01/2022 7.9 (L) 8.6 - 10.2 mg/dL Final     Total Protein   Date Value Ref Range Status   04/03/2022 5.4 (L) 6.4 - 8.3 g/dL Final   04/02/2022 5.6 (L) 6.4 - 8.3 g/dL Final   04/01/2022 5.7 (L) 6.4 - 8.3 g/dL Final     Albumin   Date Value Ref Range Status   04/03/2022 2.9 (L) 3.5 - 5.2 g/dL Final   04/02/2022 2.9 (L) 3.5 - 5.2 g/dL Final   04/01/2022 2.9 (L) 3.5 - 5.2 g/dL Final     Total Bilirubin   Date Value Ref Range Status   04/03/2022 0.4 0.0 - 1.2 mg/dL Final   04/02/2022 0.4 0.0 - 1.2 mg/dL Final   04/01/2022 0.4 0.0 - 1.2 mg/dL Final     Alkaline Phosphatase   Date Value Ref Range Status   04/03/2022 56 40 - 129 U/L Final   04/02/2022 53 40 - 129 U/L Final   04/01/2022 55 40 - 129 U/L Final     AST   Date Value Ref Range Status   04/03/2022 28 0 - 39 U/L Final   04/02/2022 30 0 - 39 U/L Final   04/01/2022 31 0 - 39 U/L Final     ALT   Date Value Ref Range Status   04/03/2022 36 0 - 40 U/L Final   04/02/2022 37 0 - 40 U/L Final   04/01/2022 40 0 - 40 U/L Final     GFR Non-   Date Value Ref Range Status   04/03/2022 >60 >=60 mL/min/1.73 Final     Comment:     Chronic Kidney Disease: less than 60 ml/min/1.73 sq.m. Kidney Failure: less than 15 ml/min/1.73 sq.m. Results valid for patients 18 years and older. 04/02/2022 >60 >=60 mL/min/1.73 Final     Comment:     Chronic Kidney Disease: less than 60 ml/min/1.73 sq.m. Kidney Failure: less than 15 ml/min/1.73 sq.m. Results valid for patients 18 years and older. 04/01/2022 53 >=60 mL/min/1.73 Final     Comment:     Chronic Kidney Disease: less than 60 ml/min/1.73 sq.m. Kidney Failure: less than 15 ml/min/1.73 sq.m. Results valid for patients 18 years and older. GFR    Date Value Ref Range Status   04/03/2022 >60  Final   04/02/2022 >60  Final   04/01/2022 >60  Final     Magnesium   Date Value Ref Range Status   03/24/2022 2.0 1.6 - 2.6 mg/dL Final   03/23/2022 1.9 1.6 - 2.6 mg/dL Final     No results found for: PHOS  No results for input(s): PH, PO2, PCO2, HCO3, BE, O2SAT in the last 72 hours. RADIOLOGY:  XR CHEST PORTABLE   Final Result   1. No signs of an acute cardiopulmonary process   2. Coarsened interstitial markings compatible pulmonary fibrotic disease         CTA CHEST W CONTRAST   Final Result   No evidence of pulmonary embolism. Left apical cavitary lesion, new since the prior examination. Differential   includes infection/cavitating pneumonia including tuberculosis, particularly   given apical location. Underlying malignancy is not excluded. Clinical   correlation recommended.          XR CHEST PORTABLE    (Results Pending)           PROBLEM LIST:  Principal Problem:    Acute respiratory failure with hypoxia (HCC)  Resolved Problems:    * No resolved hospital problems. *      IMPRESSION:  1. Acute hypoxemic respiratory failure  2. Severe COPD with exacerbation  3. Left upper lobe bleb/pneumonia    PLAN:  1. Decrease IV Decadron to twice daily  2. Continue bronchopulmonary hygiene although patient has been refusing this to some degree  3. It is possible the patient may need intubation. He is deciding, along with his wife, if this is something he wants to do  4. Chest x-ray      ATTESTATION:  ICU Staff Physician note of personal involvement in Care  As the attending physician, I certify that I personally reviewed the patients history and personally examined the patient to confirm the physical findings described above,  And that I reviewed the relevant imaging studies and available reports. I also discussed the differential diagnosis and all of the proposed management plans with the patient and individuals accompanying the patient to this visit. They had the opportunity to ask questions about the proposed management plans and to have those questions answered. This patient has a high probability of sudden, clinically significant deterioration, which requires the highest level of physician preparedness to intervene urgently. I managed/supervised life or organ supporting interventions that required frequent physician assessment. I devoted my full attention to the direct care of this patient for the amount of time indicated below. Time I spent with the family or surrogate(s) is included only if the patient was incapable of providing the necessary information or participating in medical decisions  Time devoted to teaching and to any procedures I billed separately is not included.     CRITICAL CARE TIME:  30 minutes    Electronically signed by Josef Nino MD on 4/3/2022 at 5:10 PM

## 2022-04-03 NOTE — CONSULTS
INPATIENT CONSULTATION RECORD FOR  4/3/2022      [facility]  [address] [facility]. [facility], [address] [facility]  [phone]        REASON FOR CONSULTATION: Gross painless hematuria      HISTORY OF PRESENT ILLNESS:      The patient is a 79 y.o. male patient who was admitted with shortness of breath and tested positive for COVID-19. His wife is at his bedside. She has seen my brother in the past for kidney stones. He began having acute onset of gross hematuria just today. This is never happened before. There has been no trauma. He is receiving Lovenox and has severe ecchymosis on his lower abdomen because of this. This is currently on hold. He has no pain at all. He is voiding comfortably. He is not passing clots. He denies any prior history of hematuria, dysuria, UTIs, stone disease.       Past Medical History:   Diagnosis Date    COPD (chronic obstructive pulmonary disease) ([address]     Hypertension     Oxygen dependent     2 l doesnt use continuous         Past Surgical History:   Procedure Laterality Date    HERNIA REPAIR      age 61   [facility] SHOULDER ARTHROSCOPY Right 2020    RIGHT SHOULDER ARTHROSCOPY, SUBACROMIAL DECOMPRESSION,  DEBRIDEMENT LABRIUM AND ROTATOR CUFF, CHONDROPLASTY (ARTHREX) performed by Farrah Billy DO at [address]       Medications Prior to Admission:    Medications Prior to Admission: ipratropium-albuterol (DUONEB) 0.5-2.5 (3) MG/3ML SOLN nebulizer solution,   zinc sulfate (ZINCATE) 220 (50 Zn) MG capsule, Take 1 capsule by mouth daily (Patient taking differently: Take 100 mg by mouth daily )  ascorbic acid (VITAMIN C) 1000 MG tablet, Take 1 tablet by mouth daily  [] dexamethasone (DECADRON) 6 MG tablet, Take 1 tablet by mouth daily (with breakfast) for 6 days  [DISCONTINUED] levoFLOXacin (LEVAQUIN) 250 MG tablet, Take 1 tablet by mouth daily for 7 days  budesonide (PULMICORT) 0.5 MG/2ML nebulizer suspension, inhale 1 vial via nebulizer twice daily  formoterol (PERFOROMIST) 20 MCG/2ML nebulizer solution, inhale 1 unit dose via nebulizer twice daily  lisinopril-hydroCHLOROthiazide (PRINZIDE;ZESTORETIC) 20-12.5 MG per tablet, TAKE ONE TABLET BY MOUTH DAILY  omeprazole (PRILOSEC) 20 MG delayed release capsule, TAKE ONE CAPSULE BY MOUTH EVERY MORNING  fluticasone (FLONASE) 50 MCG/ACT nasal spray, 2 sprays by Each Nostril route daily  OXYGEN, Inhale 2 L into the lungs intermittent  Cyanocobalamin (VITAMIN B 12 PO), Take 500 mcg by mouth daily   Cholecalciferol (VITAMIN D3) 50 MCG (2000 UT) TABS, Take 2 tablets by mouth daily  [DISCONTINUED] albuterol (PROVENTIL) (2.5 MG/3ML) 0.083% nebulizer solution, INHALE THE CONTENTS OF 1 VIAL (3 ML) VIA NEBULIZER 4 TIMES DAILY AS NEEDED WHEN COUGHING  WHEEZING  OR SHORT OF BREATH  albuterol sulfate  (90 Base) MCG/ACT inhaler, Inhale 2 puffs into the lungs 4 times daily as needed for Wheezing    Allergies:    Penicillins    Social History:   He  reports that he quit smoking about 5 years ago. He has a 60.00 pack-year smoking history. He has never used smokeless tobacco. He reports that he does not drink alcohol and does not use drugs. He is . He is retired from [address] [place] [address] as a     Family History:   Non-contributory to this Urological problem  family history is not on file. REVIEW OF SYSTEMS:  Respiratory: denies any symptoms of a productive cough, or hemoptysis.   He is short of breath  Cardiovascular: denies chest pain, dyspnea, or cardiac murmur  Gastrointestinal: negative for abdominal pain, diarrhea, or constipation  Dermatologic: denies any rashes, skin lesion(s), or pruritis  Neurological: negative for headaches, seizures, and tremors  Endocrine: negative for diabetic symptoms including polydipsia and polyuria or thyroid dysfunction  Ocular: denies retinopathy or glaucoma  ENT: denies sinusitis or epistaxis  Constitutional: denies nausea, vomiting, fever, or chills  Psychiatric: denies anxiety or depression  : denies kidney stones or dysuria    PHYSICAL EXAM:    Vitals:  /79   Pulse 75   Temp 96 °F (35.6 °C) (Infrared)   Resp 18   Ht 5' 10.5\" (1.791 m)   Wt 171 lb 1.2 oz (77.6 kg)   SpO2 91%   BMI 24.20 kg/m²     General:  Awake, alert, oriented X 3. Well developed, well nourished, well groomed. No apparent distress. HEENT:  Normocephalic, atraumatic. Pupils equal, round. No scleral icterus. No conjunctival injection. Normal lips, teeth, and gums. No nasal discharge. Neck:  Supple, no masses. Heart:  RRR. Lungs:  No audible wheezing. Respirations symmetric and labored. Coarse bilaterally. Using a nasal rebreather  Abdomen:  Soft, nontender, no masses, no organomegaly, no peritoneal signs. Active bowel sounds. No rebound or guarding. No flank or CVA tenderness. Severe ecchymosis over his lower abdomen bilaterally  Extremities:  No clubbing, cyanosis, or edema. Brisk peripheral pulses. Skin:  Warm and dry, no open lesions or rashes. Neuro:  Cranial nerves 2-12 intact, no focal motor or sensory deficits. Alert and oriented. Rectal: Deferred  Genitalia: Circumcised male without lesions or deformity. Testes are normal bilaterally. There is no hydrocele, hernia, varicocele bilateral.  The spermatic cord and vas deferens are normal bilaterally    LABS:    Lab Results   Component Value Date    WBC 12.3 (H) 04/03/2022    HGB 12.5 04/03/2022    HCT 39.0 04/03/2022    MCV 98.2 04/03/2022     04/03/2022       Lab Results   Component Value Date    BUN 30 (H) 04/03/2022    CREATININE 1.1 04/03/2022       Lab Results   Component Value Date    PSA 3.39 12/30/2020    PSA 1.61 11/19/2019         ASSESSMENT / PLAN:    Gross painless hematuria  Other than Lovenox which is currently on hold, he does not typically take anticoagulants. A renal ultrasound and KUB will be performed along with urine for cytology.   I feel as long as he is emptying his bladder and voiding comfortably, and not passing clots, he can be managed without a Santana. Bladder scan residuals will be measured. His PSA has been normal in the past.  He would likely require cystoscopy which could be done in the outpatient setting. We will continue to follow him closely during his hospital stay. Thank you for allowing me to assist in his care.   Sincerely,      Nolan Balderas MD  6:32 PM  4/3/2022

## 2022-04-03 NOTE — PROGRESS NOTES
At 01:25 Patient's bed alarm was sounding and patient found to be standing at the side of the bed and his heated high-flow nasal canula was disconnected. Placed on BIPAP to fix canula and oxygen sat was 100%. After a few minutes patient placed back on HHFNC at 72%FiO2 and patient given a breathing treatment. Patient continued to de-saturate to 84% and FiO2 increased to 87%. Saturation increased to 94% after a couple minutes. Patient was also incontinent of urine so patiens gown, pants, and diaper changed.

## 2022-04-03 NOTE — PROGRESS NOTES
Notified Dr. Ayaka Escamilla that patient had bloody urine the first time didn't see any clots. Received the order to hold Lovenox and put patient on SCD. UA and consult Urology.

## 2022-04-03 NOTE — PROGRESS NOTES
Pharmacy Consultation Note  (Antibiotic Dosing and Monitoring)    Initial consult date: 3/31/22  Consulting physician/provider: Dr Scottie Maurice  Drug: Vancomycin  Indication: Pneumonia    Age/  Gender Height Weight IBW  Allergy Information   81 y.o./male 5' 10.5\" (179.1 cm) 180 lb (81.6 kg)     Ideal body weight: 74.1 kg (163 lb 7.5 oz)  Adjusted ideal body weight: 75.5 kg (166 lb 8.2 oz)   Penicillins      Renal Function:  Recent Labs     04/01/22  0503 04/02/22  0656 04/03/22  0642   BUN 39* 32* 30*   CREATININE 1.3* 1.1 1.1       Intake/Output Summary (Last 24 hours) at 4/3/2022 1314  Last data filed at 4/3/2022 1239  Gross per 24 hour   Intake 2635.94 ml   Output 1200 ml   Net 1435.94 ml       Vancomycin Monitoring:  Trough:    Recent Labs     04/03/22  0210   VANCOTROUGH 10.9     Random:    Recent Labs     04/01/22  0503   VANCORANDOM 8.6       Vancomycin Administration Times:  Recent vancomycin administrations                   vancomycin (VANCOCIN) 1,000 mg in dextrose 5 % 250 mL IVPB (mg) 1,000 mg New Bag 03/31/22 0619                Assessment:  · Patient is a 79 y.o. male who has been initiated on vancomycin  Estimated Creatinine Clearance: 54 mL/min (based on SCr of 1.1 mg/dL).   · To dose vancomycin, pharmacy will be utilizing dosing based off of levels because of patient's renal impairment/insufficiency   · 4/3: Trough @ 0210 = 10.9 mcg/mL;     Plan:  · Vancomycin 1000 mg IV Q18H  · Repeat trough as needed  · Will continue to monitor renal function   · Clinical pharmacy to follow      Juan Carlos Helms PharmD 4/3/2022 1:14 PM   [phone]

## 2022-04-03 NOTE — PLAN OF CARE
Problem: Falls - Risk of:  Goal: Will remain free from falls  Description: Will remain free from falls  4/3/2022 1401 by Yecenia Sandhu RN  Outcome: Not Met This Shift  4/3/2022 0220 by Lexie Groves RN  Outcome: Met This Shift  Goal: Absence of physical injury  Description: Absence of physical injury  4/3/2022 1401 by Yecenia Sandhu RN  Outcome: Not Met This Shift  4/3/2022 0220 by Lexie Groves RN  Outcome: Met This Shift     Problem: Airway Clearance - Ineffective  Goal: Achieve or maintain patent airway  4/3/2022 1401 by Yecenia Sandhu RN  Outcome: Not Met This Shift  4/3/2022 0220 by Lexie Groves RN  Outcome: Met This Shift     Problem: Gas Exchange - Impaired  Goal: Absence of hypoxia  4/3/2022 1401 by Yecenia Sandhu RN  Outcome: Not Met This Shift  4/3/2022 0220 by Lexie Groves RN  Outcome: Met This Shift  Goal: Promote optimal lung function  4/3/2022 1401 by Yecenia Sandhu RN  Outcome: Not Met This Shift  4/3/2022 0220 by Lexie Groves RN  Outcome: Met This Shift     Problem: Breathing Pattern - Ineffective  Goal: Ability to achieve and maintain a regular respiratory rate  4/3/2022 1401 by Yecenia Sandhu RN  Outcome: Not Met This Shift  4/3/2022 0220 by Lexie Groves RN  Outcome: Met This Shift     Problem:  Body Temperature -  Risk of, Imbalanced  Goal: Ability to maintain a body temperature within defined limits  4/3/2022 1401 by Yecenia Sandhu RN  Outcome: Met This Shift  4/3/2022 0220 by Lexie Groves RN  Outcome: Met This Shift  Goal: Will regain or maintain usual level of consciousness  4/3/2022 1401 by Yecenia Sandhu RN  Outcome: Met This Shift  4/3/2022 0220 by Lexie Groves RN  Outcome: Met This Shift  Goal: Complications related to the disease process, condition or treatment will be avoided or minimized  4/3/2022 1401 by Yecenia Sandhu RN  Outcome: Not Met This Shift  4/3/2022 0220 by Lexie Groves RN  Outcome: Met This Shift     Problem: Isolation Precautions - Risk of Spread of Infection  Goal: Prevent transmission of infection  4/3/2022 1401 by Cody Lantigua RN  Outcome: Not Met This Shift  4/3/2022 0220 by Demetrio Nicole RN  Outcome: Met This Shift     Problem: Nutrition Deficits  Goal: Optimize nutritional status  4/3/2022 1401 by Cody Lantigua RN  Outcome: Not Met This Shift  4/3/2022 0220 by Demetrio Nicole RN  Outcome: Met This Shift     Problem: Risk for Fluid Volume Deficit  Goal: Maintain normal heart rhythm  4/3/2022 1401 by Cody Lantigua RN  Outcome: Not Met This Shift  4/3/2022 0220 by Demetrio Nicole RN  Outcome: Met This Shift  Goal: Maintain absence of muscle cramping  4/3/2022 0220 by Demetrio Nicole RN  Outcome: Met This Shift  Goal: Maintain normal serum potassium, sodium, calcium, phosphorus, and pH  4/3/2022 1401 by Cody Lantigua RN  Outcome: Met This Shift  4/3/2022 0220 by Demetrio Nicole RN  Outcome: Met This Shift     Problem: Loneliness or Risk for Loneliness  Goal: Demonstrate positive use of time alone when socialization is not possible  4/3/2022 0220 by Demetrio Nicole RN  Outcome: Met This Shift     Problem: Fatigue  Goal: Verbalize increase energy and improved vitality  4/3/2022 1401 by Cody Lantigua RN  Outcome: Not Met This Shift  4/3/2022 0220 by Demetrio Nicole RN  Outcome: Met This Shift     Problem: Patient Education: Go to Patient Education Activity  Goal: Patient/Family Education  4/3/2022 0220 by Demetrio Nicole RN  Outcome: Met This Shift     Problem: Skin Integrity:  Goal: Will show no infection signs and symptoms  Description: Will show no infection signs and symptoms  4/3/2022 1401 by Coyd Lantigua RN  Outcome: Not Met This Shift  4/3/2022 0220 by Demetrio Nicole RN  Outcome: Met This Shift  Goal: Absence of new skin breakdown  Description: Absence of new skin breakdown  4/3/2022 1401 by Cody Lantigua RN  Outcome: Not Met This Shift  4/3/2022 0220 by Demetrio Nicole RN  Outcome: Met This Shift

## 2022-04-03 NOTE — PROGRESS NOTES
Department of Internal Medicine        CHIEF COMPLAINT: Shortness of breath    Reason for Admission: Left apical cavitary pneumonia    HISTORY OF PRESENT ILLNESS:      The patient is a 79 y.o. male who presents with being discharged back on 327 with the patient having a history of being admitted 3/23 with symptoms of shortness of breath for 2 weeks prior to that. Patient was so weak that he was not able to get out of bed 4 days before admission. Patient was tested positive for Covid with the patient never being vaccinated. Patient D-dimer was 534 on that admission. Patient white count was normal with patient having history of chronic hypoxic respiratory failure normally on 3 L nasal cannula from COPD. Patient stated he was feeling back to normal and adamantly request to be discharged home with his wife at the bedside. Patient's white count was normal and on 4 L nasal cannula at rest his O2 sat was 94-97%. Patient did need to increase his O2 to 6 L with activity. Patient stated that he was feeling better but progressively got more short of breath and came to the ED with a CAT scan showing new apical cavitary lesion since prior examination. Chest x-ray back on 3/23 showed a ill-defined patchy medial left upper lobe infiltrate. Patient currently on 15 L high flow nasal cannula with O2 sat 93%. Case discussed with infectious disease. Patient's wife is at the bedside and case discussed. 4/1/2022  Patient seen examined on [address]. Patient states he feels fairly good today and denies any pain. Patient very very short of breath with any activity. BUN/creatinine 39/1.3 today with improvement from yesterday. WBC 11.0 with a hemoglobin of 13.4. Temperature is 97.9 with heart rate 75 blood pressure 99/58. O2 sat is 90% on heated high flow nasal cannula with FiO2 75% patient viral respiratory panel is positive again for COVID-19. Infectious disease, pulmonary note reviewed.     4/2/2022   Patient seen examined on [facility]. Patient wife is at the bedside and case discussed. Patient sitting up in chair currently. Patient states he feels a little bit better. Patient denies any chest or abdominal pain. He still has a nonproductive cough. Patient is using vest therapy and is tolerating it fairly well. BUN/creatinine 32/1.1 today with CRP improved 2.2. WBC 11.7 hemoglobin 12.8. Temperature 97.6 with heart rate of 56 and blood pressure 102/60. O2 sat 94% on heated high flow nasal cannula with FiO2 of 60%. Urine output is good. 4/3/2022  Patient seen examined on [facility]. Patient's wife and patient's nurse at the bedside and case discussed. Patient lying in bed today with patient having increasing shortness of breath early this morning with the patient O2 saturation decreasing at that time and currently the patient's 100% FiO2 heated high flow nasal cannula. He denies any chest or abdominal pain. He states he otherwise feels okay except for increasing shortness of breath with any activity which includes eating and talking. BUN/creatinine 30/1.1. Liver enzymes normal with a WBC 12.3 and hemoglobin 12.5. D-dimer yesterday was only 630. Temperature 97.8 with a heart rate of 58 blood pressure 116/67. Urinary output ranges 300-500 cc a shift. We will give dietary supplements 4 times a day so the patient does not have to use a much energy eating his meals. Change the chest vest to 4 times a day between 8 AM and 10 PM with the patient refusing to do it at 5 AM in the morning.       Past Medical History:    Past Medical History:   Diagnosis Date    COPD (chronic obstructive pulmonary disease) ([address]     Hypertension     Oxygen dependent     2 l doesnt use continuous     Past Surgical History:    Past Surgical History:   Procedure Laterality Date    HERNIA REPAIR      age 61   [place] SHOULDER ARTHROSCOPY Right 9/9/2020    RIGHT SHOULDER ARTHROSCOPY, SUBACROMIAL DECOMPRESSION,  DEBRIDEMENT LABRIUM AND ROTATOR CUFF, CHONDROPLASTY (ARTHREX) performed by Camden Marquis DO at [address]       Medications Prior to Admission:    @  Prior to Admission medications    Medication Sig Start Date End Date Taking?  Authorizing Provider   ipratropium-albuterol (DUONEB) 0.5-2.5 (3) MG/3ML SOLN nebulizer solution  11/16/21   Historical Provider, MD   zinc sulfate (ZINCATE) 220 (50 Zn) MG capsule Take 1 capsule by mouth daily  Patient taking differently: Take 100 mg by mouth daily  3/26/22   Eduardo Ramirez DO   ascorbic acid (VITAMIN C) 1000 MG tablet Take 1 tablet by mouth daily 3/26/22   Eduardo Ramirez DO   budesonide (PULMICORT) 0.5 MG/2ML nebulizer suspension inhale 1 vial via nebulizer twice daily 12/16/21   Historical Provider, MD   formoterol (PERFOROMIST) 20 MCG/2ML nebulizer solution inhale 1 unit dose via nebulizer twice daily 12/16/21   Historical Provider, MD   lisinopril-hydroCHLOROthiazide (PRINZIDE;ZESTORETIC) 20-12.5 MG per tablet TAKE ONE TABLET BY MOUTH DAILY 11/12/21   Jermain Betancur DO   omeprazole (PRILOSEC) 20 MG delayed release capsule TAKE ONE CAPSULE BY MOUTH EVERY MORNING 5/27/21   Historical Provider, MD   fluticasone Jackquline Sees) 50 MCG/ACT nasal spray 2 sprays by Each Nostril route daily 12/21/20   INDIGO Metzger - CNP   OXYGEN Inhale 2 L into the lungs intermittent    Historical Provider, MD   Cyanocobalamin (VITAMIN B 12 PO) Take 500 mcg by mouth daily     Historical Provider, MD   Cholecalciferol (VITAMIN D3) 50 MCG (2000 UT) TABS Take 2 tablets by mouth daily    Historical Provider, MD   albuterol sulfate  (90 Base) MCG/ACT inhaler Inhale 2 puffs into the lungs 4 times daily as needed for Wheezing 12/24/19   Jermain Betancur DO       Allergies:  Penicillins    Social History:   Social History     Socioeconomic History    Marital status:      Spouse name: Not on file    Number of children: Not on file    Years of education: Not on file    Highest education level: Not on file Occupational History    Not on file   Tobacco Use    Smoking status: Former Smoker     Packs/day: 1.00     Years: 60.00     Pack years: 60.00     Quit date: 2016     Years since quittin.3    Smokeless tobacco: Never Used   Vaping Use    Vaping Use: Never used   Substance and Sexual Activity    Alcohol use: No    Drug use: No    Sexual activity: Not on file   Other Topics Concern    Not on file   Social History Narrative    Not on file     Social Determinants of Health     Financial Resource Strain: Low Risk     Difficulty of Paying Living Expenses: Not hard at all   Food Insecurity: No Food Insecurity    Worried About Running Out of Food in the Last Year: Never true    [address] in the Last Year: Never true   Transportation Needs: No Transportation Needs    Lack of Transportation (Medical): No    Lack of Transportation (Non-Medical): No   Physical Activity:     Days of Exercise per Week: Not on file    Minutes of Exercise per Session: Not on file   Stress:     Feeling of Stress : Not on file   Social Connections:     Frequency of Communication with Friends and Family: Not on file    Frequency of Social Gatherings with Friends and Family: Not on file    Attends Restorationist Services: Not on file    Active Member of [address] [place] [address] [employer] or Organizations: Not on file    Attends Club or Organization Meetings: Not on file    Marital Status: Not on file   Intimate Partner Violence:     Fear of Current or Ex-Partner: Not on file    Emotionally Abused: Not on file    Physically Abused: Not on file    Sexually Abused: Not on file   Housing Stability:     Unable to Pay for Housing in the Last Year: Not on file    Number of [place] in the Last Year: Not on file    Unstable Housing in the Last Year: Not on file       Family History:   History reviewed. No pertinent family history. REVIEW OF SYSTEMS:    Gen: Patient denies any lightheadedness or dizziness. No LOC or syncope.   No fevers or chills. HEENT: No earache, sore throat or nasal congestion. Resp: Denies cough, hemoptysis or sputum production. Cardiac: Denies chest pain,+ SOB,no diaphoresis or palpitations. GI: No nausea, vomiting, diarrhea or constipation. No melena or hematochezia. : No urinary complaints, dysuria, hematuria or frequency. MSK: No extremity weakness, paralysis or paresthesias. PHYSICAL EXAM:    Vitals:  /67   Pulse 58   Temp 97.3 °F (36.3 °C) (Temporal)   Resp (!) 34   Ht 5' 10.5\" (1.791 m)   Wt 171 lb 1.2 oz (77.6 kg)   SpO2 98%   BMI 24.20 kg/m²     General:  This is a 79 y.o. yo male who is alert and oriented in moderate respiratory distress  HEENT:  Head is normocephalic and atraumatic, PERRLA, EOMI, mucus membranes moist with no pharyngeal erythema or exudate. Neck:  Supple with no carotid bruits, JVD or thyromegaly.   No cervical adenopathy  CV:  Regular rate and rhythm, no murmurs  Lungs: Coarse decreased breath sounds to auscultation bilaterally with scattered crackles and no wheezes or rhonchi  Abdomen:  Soft, nontender, nondistended, bowel sounds present  Extremities:  No edema, peripheral pulses intact bilaterally  Neuro:  Cranial nerves II-XII grossly intact; motor and sensory function intact with no focal deficits  Skin:  No rashes, lesions or wounds    DATA:  CBC with Differential:    Lab Results   Component Value Date    WBC 12.3 04/03/2022    RBC 3.97 04/03/2022    HGB 12.5 04/03/2022    HCT 39.0 04/03/2022     04/03/2022    MCV 98.2 04/03/2022    MCH 31.5 04/03/2022    MCHC 32.1 04/03/2022    RDW 15.1 04/03/2022    LYMPHOPCT 0.9 04/03/2022    MONOPCT 3.5 04/03/2022    BASOPCT 0.1 04/03/2022    MONOSABS 0.49 04/03/2022    LYMPHSABS 0.12 04/03/2022    EOSABS 0.00 04/03/2022    BASOSABS 0.00 04/03/2022     CMP:    Lab Results   Component Value Date     04/03/2022    K 4.5 04/03/2022     04/03/2022    CO2 18 04/03/2022    BUN 30 04/03/2022    CREATININE 1.1 04/03/2022    GFRAA >60 04/03/2022    LABGLOM >60 04/03/2022    GLUCOSE 111 04/03/2022    PROT 5.4 04/03/2022    LABALBU 2.9 04/03/2022    CALCIUM 7.7 04/03/2022    BILITOT 0.4 04/03/2022    ALKPHOS 56 04/03/2022    AST 28 04/03/2022    ALT 36 04/03/2022     Magnesium:    Lab Results   Component Value Date    MG 2.0 03/24/2022     Phosphorus:  No results found for: PHOS  PT/INR:    Lab Results   Component Value Date    PROTIME 13.4 04/01/2022    INR 1.2 04/01/2022     Troponin:  No results found for: TROPONINI  U/A:    Lab Results   Component Value Date    COLORU DKYELLOW 03/24/2022    PROTEINU Negative 03/24/2022    PHUR 5.5 03/24/2022    WBCUA 1-3 03/24/2022    RBCUA NONE 03/24/2022    BACTERIA MANY 03/24/2022    CLARITYU Clear 03/24/2022    SPECGRAV >=1.030 03/24/2022    LEUKOCYTESUR Negative 03/24/2022    UROBILINOGEN 0.2 03/24/2022    BILIRUBINUR Negative 03/24/2022    BLOODU Negative 03/24/2022    GLUCOSEU Negative 03/24/2022     ABG:    Lab Results   Component Value Date    HCO3 19.6 03/31/2022    BE -2.1 03/31/2022    O2SAT 86.7 03/31/2022     HgBA1c:    Lab Results   Component Value Date    LABA1C 5.5 12/01/2021     FLP:    Lab Results   Component Value Date    TRIG 66 03/24/2022    HDL 45 03/24/2022    LDLCALC 110 03/24/2022    LABVLDL 13 03/24/2022     TSH:    Lab Results   Component Value Date    TSH 1.070 03/24/2022     IRON:  No results found for: IRON  LIPASE:  No results found for: LIPASE    ASSESSMENT AND PLAN:      Patient Active Problem List    Diagnosis Date Noted    Acute respiratory failure with hypoxia ([facility] 03/31/2022    [facility] 03/23/2022    COVID-19 03/23/2022    Chronic bronchitis ([address] 04/20/2021    Shoulder impingement, right 08/17/2020    Nontraumatic complete tear of right rotator cuff 08/17/2020     Impression:  1. Acute on chronic hypoxic respiratory failure  2. Left upper lobe infected bleb- pneumonia  3. History of COVID-19 infection March 23  4.   History of COPD  5. History hypertension-with hypotension on admission  6. Acute kidney injury-resolved  7. History of chronic kidney disease stage 23a  8. Oral thrush    Plan:  Admit to [address]  Home medications reviewed  Monitor heart rate, blood pressure, O2 saturation  Consult ID  Consult pulmonology  Lovenox 40 mg subcu daily  General diet  Activity as tolerated  Heated high flow nasal cannula  IV fluids normal saline 20 KCl at 75 cc an hour    Hold Prinzide  IV vancomycin  IV Eraxis  IV Merrem  Decadron 6 mg IV push every 8 hours  Incentive spirometry with flutter valve every hour while awake  Chest vest percussion 4 times daily-8 AM10 PM  High-protein dietary supplements 4 times a day while patient is unable to eat his meals because of his shortness of breath. D-dimer, see reactive protein every other day x2  CMP, CBC in a.m.     Renee Chowdhury DO, D.O.  4/3/2022  11:01 AM

## 2022-04-03 NOTE — PROGRESS NOTES
[address] [place] [address] [facility]  [facility]  Progress Note    CC: COVID   Face to face encounter   SUBJECTIVE:  In bed HFNC   SUPINE CAN ONLY LAY ON HIS RIGTH SIDE  no c/o   Patient is tolerating medications. No reported adverse drug reactions. ROS: No nausea, vomiting, diarrhea. Has been afebrile. Medications:  Scheduled Meds:   vancomycin  1,000 mg IntraVENous Q18H    anidulafungin  100 mg IntraVENous Q24H    meropenem  1,000 mg IntraVENous Q12H    vitamin B-6  50 mg Oral Daily    thiamine mononitrate  100 mg Oral Daily    vitamin E  400 Units Oral Daily    melatonin  5 mg Oral Nightly    Vitamin D  4,000 Units Oral Daily    fluticasone  2 spray Each Nostril Daily    pantoprazole  40 mg Oral QAM AC    zinc sulfate  50 mg Oral Daily    ascorbic acid  1,000 mg Oral Daily    enoxaparin  30 mg SubCUTAneous Daily    dexamethasone  6 mg IntraVENous Q8H    ipratropium-albuterol  1 ampule Inhalation Q4H    budesonide  0.5 mg Nebulization BID     Continuous Infusions:   sodium chloride Stopped (03/31/22 1436)    0.9% NaCl with KCl 20 mEq Stopped (04/03/22 1238)     PRN Meds:prochlorperazine, acetaminophen  OBJECTIVE:  Patient Vitals for the past 24 hrs:   BP Temp Temp src Pulse Resp SpO2   04/03/22 1500 118/79 96 °F (35.6 °C) Infrared 75 18 94 %   04/03/22 1230      92 %   04/03/22 1014      98 %   04/03/22 1013     (!) 34    04/03/22 0809 116/67 97.3 °F (36.3 °C) Temporal 58 22 98 %   04/03/22 0635      91 %   04/03/22 0624      98 %   04/03/22 0622      99 %   04/03/22 0141      92 %   04/03/22 0138     22 91 %   04/03/22 0135      (!) 87 %   04/03/22 0130      (!) 84 %   04/02/22 2325 112/60 96.2 °F (35.7 °C) Infrared 58 22 92 %     Constitutional:   On high flow    Skin: Warm and dry. Head: at/nC  Chest:  Auscultation reveals some expiratory wheezes noted. On high flow nasal canula.    Cardiovascular: S1 and S2 are rhythmic and regular. Abdomen: Positive bowel sounds to auscultation. Benign to palpation. Extremities:  , + edema.   PIV    Laboratory and Tests Review:  Lab Results   Component Value Date    WBC 12.3 (H) 04/03/2022    WBC 11.7 (H) 04/02/2022    WBC 11.0 04/01/2022    HGB 12.5 04/03/2022    HCT 39.0 04/03/2022    MCV 98.2 04/03/2022     04/03/2022     Lab Results   Component Value Date    NEUTROABS 11.81 (H) 04/03/2022    NEUTROABS 10.85 (H) 04/02/2022    NEUTROABS 10.89 (H) 04/01/2022     Lab Results   Component Value Date    CRP 2.2 (H) 04/02/2022    CRP 4.4 (H) 04/01/2022    CRP 2.6 (H) 03/27/2022     Lab Results   Component Value Date    SEDRATE 28 (H) 04/01/2022     Lab Results   Component Value Date    ALT 36 04/03/2022    AST 28 04/03/2022    ALKPHOS 56 04/03/2022    BILITOT 0.4 04/03/2022     Lab Results   Component Value Date     04/03/2022    K 4.5 04/03/2022     04/03/2022    CO2 18 04/03/2022    BUN 30 04/03/2022    CREATININE 1.1 04/03/2022    GFRAA >60 04/03/2022    LABGLOM >60 04/03/2022    GLUCOSE 111 04/03/2022    PROT 5.4 04/03/2022    LABALBU 2.9 04/03/2022    CALCIUM 7.7 04/03/2022    BILITOT 0.4 04/03/2022    ALKPHOS 56 04/03/2022    AST 28 04/03/2022    ALT 36 04/03/2022      SARS-COV-2 biomarkers    Recent Labs     04/01/22  0503 04/02/22  0656 04/03/22  0642   CRP 4.4* 2.2*  --    FERRITIN 1,435  --   --    *  --   --    DDIMER 786 630  --    FIBRINOGEN 428  --   --    INR 1.2  --   --    PROTIME 13.4*  --   --    AST 31 30 28   ALT 40 37 36     Lab Results   Component Value Date    CHOL 168 03/24/2022    TRIG 66 03/24/2022    HDL 45 03/24/2022    LDLCALC 110 03/24/2022    LABVLDL 13 03/24/2022     Lab Results   Component Value Date/Time    VITD25 >120 (H) 03/31/2022 11:21 AM     Radiology:  Reviewed     Microbiology:   3/31/2022- blood cx- no growth to date   3/31/2022- respiratory panel- COVID++   Strep pneumo and legionella- pending     ASSESSMENT:  · COVID -19  · Cavitary lung lesion PROB COVID LABS WORK UP PENDING   · Unvaccinated  · History of COPD  · Hypoxia   · S/p treatment with Baricitinib   · TORY BETTER     PLAN:  · Currently on vancomycin , meropenem and eraxis- will narrow down once cultures/ labs are back   · DAY 4  · cultures ngtd  · Monitor labs- procal- 0.12   ·   Monitor respiratory status-       Imaging and labs were reviewed. Thank you for involving me in the care of Marguerite Porter. Please do not hesitate to call for any questions or concerns.     Electronically signed by Maki Leblanc MD on 4/3/2022 at 3:37 PM

## 2022-04-04 NOTE — PROGRESS NOTES
Department of Internal Medicine        CHIEF COMPLAINT: Shortness of breath    Reason for Admission: Left apical cavitary pneumonia    HISTORY OF PRESENT ILLNESS:      The patient is a 79 y.o. male who presents with being discharged back on 327 with the patient having a history of being admitted 3/23 with symptoms of shortness of breath for 2 weeks prior to that. Patient was so weak that he was not able to get out of bed 4 days before admission. Patient was tested positive for Covid with the patient never being vaccinated. Patient D-dimer was 534 on that admission. Patient white count was normal with patient having history of chronic hypoxic respiratory failure normally on 3 L nasal cannula from COPD. Patient stated he was feeling back to normal and adamantly request to be discharged home with his wife at the bedside. Patient's white count was normal and on 4 L nasal cannula at rest his O2 sat was 94-97%. Patient did need to increase his O2 to 6 L with activity. Patient stated that he was feeling better but progressively got more short of breath and came to the ED with a CAT scan showing new apical cavitary lesion since prior examination. Chest x-ray back on 3/23 showed a ill-defined patchy medial left upper lobe infiltrate. Patient currently on 15 L high flow nasal cannula with O2 sat 93%. Case discussed with infectious disease. Patient's wife is at the bedside and case discussed. 4/1/2022  Patient seen examined on [address]. Patient states he feels fairly good today and denies any pain. Patient very very short of breath with any activity. BUN/creatinine 39/1.3 today with improvement from yesterday. WBC 11.0 with a hemoglobin of 13.4. Temperature is 97.9 with heart rate 75 blood pressure 99/58. O2 sat is 90% on heated high flow nasal cannula with FiO2 75% patient viral respiratory panel is positive again for COVID-19. Infectious disease, pulmonary note reviewed.     4/2/2022   Patient seen examined on [facility]. Patient wife is at the bedside and case discussed. Patient sitting up in chair currently. Patient states he feels a little bit better. Patient denies any chest or abdominal pain. He still has a nonproductive cough. Patient is using vest therapy and is tolerating it fairly well. BUN/creatinine 32/1.1 today with CRP improved 2.2. WBC 11.7 hemoglobin 12.8. Temperature 97.6 with heart rate of 56 and blood pressure 102/60. O2 sat 94% on heated high flow nasal cannula with FiO2 of 60%. Urine output is good. 4/3/2022  Patient seen examined on [facility]. Patient's wife and patient's nurse at the bedside and case discussed. Patient lying in bed today with patient having increasing shortness of breath early this morning with the patient O2 saturation decreasing at that time and currently the patient's 100% FiO2 heated high flow nasal cannula. He denies any chest or abdominal pain. He states he otherwise feels okay except for increasing shortness of breath with any activity which includes eating and talking. BUN/creatinine 30/1.1. Liver enzymes normal with a WBC 12.3 and hemoglobin 12.5. D-dimer yesterday was only 630. Temperature 97.8 with a heart rate of 58 blood pressure 116/67. Urinary output ranges 300-500 cc a shift. We will give dietary supplements 4 times a day so the patient does not have to use a much energy eating his meals. Change the chest vest to 4 times a day between 8 AM and 10 PM with the patient refusing to do it at 5 AM in the morning. 4/4/2022  Patient seen examined on [facility]. Patient's wife and son is at the bedside case discussed. Patient denies any problem with any chest pain, abdominal pain, nausea/vomiting, dizziness. Patient complains of shortness of breath with any activity including eating and talking for extended periods of time. Patient with episode of gross hematuria yesterday afternoon. Urology was consulted. BUN/creatinine 27/1.1.   Liver enzymes are normal with WBC 15 and a hemoglobin 12.9. D-dimer is 628. Urinalysis has large amount of blood. Urology, ID notes reviewed. Pending pulmonology evaluation today but nurse practitioner note from earlier was reviewed. Past Medical History:    Past Medical History:   Diagnosis Date    COPD (chronic obstructive pulmonary disease) ([address]     Hypertension     Oxygen dependent     2 l doesnt use continuous     Past Surgical History:    Past Surgical History:   Procedure Laterality Date    HERNIA REPAIR      age 61   [place] SHOULDER ARTHROSCOPY Right 9/9/2020    RIGHT SHOULDER ARTHROSCOPY, SUBACROMIAL DECOMPRESSION,  DEBRIDEMENT LABRIUM AND ROTATOR CUFF, CHONDROPLASTY (ARTHREX) performed by Dandy King DO at [address]       Medications Prior to Admission:    @  Prior to Admission medications    Medication Sig Start Date End Date Taking?  Authorizing Provider   ipratropium-albuterol (DUONEB) 0.5-2.5 (3) MG/3ML SOLN nebulizer solution  11/16/21   Historical Provider, MD   zinc sulfate (ZINCATE) 220 (50 Zn) MG capsule Take 1 capsule by mouth daily  Patient taking differently: Take 100 mg by mouth daily  3/26/22   Mike Veronica DO   ascorbic acid (VITAMIN C) 1000 MG tablet Take 1 tablet by mouth daily 3/26/22   Mike Veronica DO   budesonide (PULMICORT) 0.5 MG/2ML nebulizer suspension inhale 1 vial via nebulizer twice daily 12/16/21   Historical Provider, MD   formoterol (PERFOROMIST) 20 MCG/2ML nebulizer solution inhale 1 unit dose via nebulizer twice daily 12/16/21   Historical Provider, MD   lisinopril-hydroCHLOROthiazide (PRINZIDE;ZESTORETIC) 20-12.5 MG per tablet TAKE ONE TABLET BY MOUTH DAILY 11/12/21   Tk Howard DO   omeprazole (PRILOSEC) 20 MG delayed release capsule TAKE ONE CAPSULE BY MOUTH EVERY MORNING 5/27/21   Historical Provider, MD   fluticasone [facility]) 50 MCG/ACT nasal spray 2 sprays by Each Nostril route daily 12/21/20   Crissy Ogden, APRN - CNP   OXYGEN Inhale 2 L into the lungs intermittent Historical Provider, MD   Cyanocobalamin (VITAMIN B 12 PO) Take 500 mcg by mouth daily     Historical Provider, MD   Cholecalciferol (VITAMIN D3) 50 MCG (2000 UT) TABS Take 2 tablets by mouth daily    Historical Provider, MD   albuterol sulfate  (90 Base) MCG/ACT inhaler Inhale 2 puffs into the lungs 4 times daily as needed for Wheezing 19   Tk HowardDO       Allergies:  Penicillins    Social History:   Social History     Socioeconomic History    Marital status:      Spouse name: Not on file    Number of children: Not on file    Years of education: Not on file    Highest education level: Not on file   Occupational History    Not on file   Tobacco Use    Smoking status: Former Smoker     Packs/day: 1.00     Years: 60.00     Pack years: 60.00     Quit date: 2016     Years since quittin.3    Smokeless tobacco: Never Used   Vaping Use    Vaping Use: Never used   Substance and Sexual Activity    Alcohol use: No    Drug use: No    Sexual activity: Not on file   Other Topics Concern    Not on file   Social History Narrative    Not on file     Social Determinants of Health     Financial Resource Strain: Low Risk     Difficulty of Paying Living Expenses: Not hard at all   Food Insecurity: No Food Insecurity    Worried About [facility] in the Last Year: Never true    [facility] in the Last Year: Never true   Transportation Needs: No Transportation Needs    Lack of Transportation (Medical): No    Lack of Transportation (Non-Medical):  No   Physical Activity:     Days of Exercise per Week: Not on file    Minutes of Exercise per Session: Not on file   Stress:     Feeling of Stress : Not on file   Social Connections:     Frequency of Communication with Friends and Family: Not on file    Frequency of Social Gatherings with Friends and Family: Not on file    Attends Hoahaoism Services: Not on file    Active Member of Clubs or Organizations: Not on file  Attends Club or Organization Meetings: Not on file    Marital Status: Not on file   Intimate Partner Violence:     Fear of Current or Ex-Partner: Not on file    Emotionally Abused: Not on file    Physically Abused: Not on file    Sexually Abused: Not on file   Housing Stability:     Unable to Pay for Housing in the Last Year: Not on file    Number of Nika in the Last Year: Not on file    Unstable Housing in the Last Year: Not on file       Family History:   History reviewed. No pertinent family history. REVIEW OF SYSTEMS:    Gen: Patient denies any lightheadedness or dizziness. No LOC or syncope. No fevers or chills. HEENT: No earache, sore throat or nasal congestion. Resp: Denies cough, hemoptysis or sputum production. Cardiac: Denies chest pain,+ SOB,no diaphoresis or palpitations. GI: No nausea, vomiting, diarrhea or constipation. No melena or hematochezia. : No urinary complaints, dysuria, hematuria or frequency. MSK: No extremity weakness, paralysis or paresthesias. PHYSICAL EXAM:    Vitals:  /77   Pulse 92   Temp 98.6 °F (37 °C) (Infrared)   Resp (!) 35   Ht 5' 10.5\" (1.791 m)   Wt 171 lb 1.2 oz (77.6 kg)   SpO2 94%   BMI 24.20 kg/m²     General:  This is a 79 y.o. yo male who is alert and oriented in moderate respiratory distress  HEENT:  Head is normocephalic and atraumatic, PERRLA, EOMI, mucus membranes moist with no pharyngeal erythema or exudate. Neck:  Supple with no carotid bruits, JVD or thyromegaly.   No cervical adenopathy  CV:  Regular rate and rhythm, no murmurs  Lungs: Coarse decreased breath sounds to auscultation bilaterally with scattered crackles and no wheezes or rhonchi  Abdomen:  Soft, nontender, nondistended, bowel sounds present  Extremities:  No edema, peripheral pulses intact bilaterally  Neuro:  Cranial nerves II-XII grossly intact; motor and sensory function intact with no focal deficits  Skin:  No rashes, lesions or wounds    DATA:  CBC with Differential:    Lab Results   Component Value Date    WBC 15.0 04/04/2022    RBC 4.07 04/04/2022    HGB 12.9 04/04/2022    HCT 39.6 04/04/2022     04/04/2022    MCV 97.3 04/04/2022    MCH 31.7 04/04/2022    MCHC 32.6 04/04/2022    RDW 15.1 04/04/2022    LYMPHOPCT 1.9 04/04/2022    MONOPCT 1.8 04/04/2022    BASOPCT 0.1 04/04/2022    MONOSABS 0.30 04/04/2022    LYMPHSABS 0.00 04/04/2022    EOSABS 0.00 04/04/2022    BASOSABS 0.00 04/04/2022     CMP:    Lab Results   Component Value Date     04/04/2022    K 4.7 04/04/2022     04/04/2022    CO2 19 04/04/2022    BUN 27 04/04/2022    CREATININE 1.1 04/04/2022    GFRAA >60 04/04/2022    LABGLOM >60 04/04/2022    GLUCOSE 115 04/04/2022    PROT 5.6 04/04/2022    LABALBU 3.0 04/04/2022    CALCIUM 8.0 04/04/2022    BILITOT 0.5 04/04/2022    ALKPHOS 60 04/04/2022    AST 27 04/04/2022    ALT 36 04/04/2022     Magnesium:    Lab Results   Component Value Date    MG 2.0 03/24/2022     Phosphorus:  No results found for: PHOS  PT/INR:    Lab Results   Component Value Date    PROTIME 13.4 04/01/2022    INR 1.2 04/01/2022     Troponin:  No results found for: TROPONINI  U/A:    Lab Results   Component Value Date    COLORU RED 04/03/2022    PROTEINU TRACE 04/03/2022    PHUR 5.5 04/03/2022    WBCUA NONE 04/03/2022    RBCUA PACKED 04/03/2022    BACTERIA NONE SEEN 04/03/2022    CLARITYU CLOUDY 04/03/2022    SPECGRAV 1.020 04/03/2022    LEUKOCYTESUR Negative 04/03/2022    UROBILINOGEN 0.2 04/03/2022    BILIRUBINUR Negative 04/03/2022    BLOODU LARGE 04/03/2022    GLUCOSEU Negative 04/03/2022     ABG:    Lab Results   Component Value Date    HCO3 19.6 03/31/2022    BE -2.1 03/31/2022    O2SAT 86.7 03/31/2022     HgBA1c:    Lab Results   Component Value Date    LABA1C 5.5 12/01/2021     FLP:    Lab Results   Component Value Date    TRIG 66 03/24/2022    HDL 45 03/24/2022    LDLCALC 110 03/24/2022    LABVLDL 13 03/24/2022     TSH:    Lab Results Component Value Date    TSH 1.070 03/24/2022     IRON:  No results found for: IRON  LIPASE:  No results found for: LIPASE    ASSESSMENT AND PLAN:      Patient Active Problem List    Diagnosis Date Noted    Acute respiratory failure with hypoxia ([facility] [address] 03/31/2022    COPD exacerbation ([facility] [address] 03/23/2022    COVID-19 03/23/2022    Chronic bronchitis ([facility] [address] 04/20/2021    Shoulder impingement, right 08/17/2020    Nontraumatic complete tear of right rotator cuff 08/17/2020     Impression:  1. Acute on chronic hypoxic respiratory failure  2. Left upper lobe infected bleb- pneumonia  3. History of COVID-19 infection March 23, repeat PCR was positive on 3/31  4. History of COPD  5. History hypertension-with hypotension on admission  6. Acute kidney injury-resolved  7. History of chronic kidney disease stage 23a  8. Oral thrush  9. Gross hematuria    Plan:  Admit to [facility]  Home medications reviewed  Monitor heart rate, blood pressure, O2 saturation  Consult ID  Consult pulmonology  Lovenox 40 mg subcu daily  General diet  Activity as tolerated  Heated high flow nasal cannula  IV fluids normal saline 20 KCl at 75 cc an hour    Hold Prinzide  IV vancomycin  IV Eraxis  IV Merrem  Decadron 6 mg IV push every 8 hours  Incentive spirometry with flutter valve every hour while awake  Chest vest percussion 4 times daily-8 AM10 PM  High-protein dietary supplements 4 times a day while patient is unable to eat his meals because of his shortness of breath. D-dimer, see reactive protein every other day x2  CMP, CBC in a.m.     Teresa Harkins DO, D.O.  4/4/2022  10:00 AM

## 2022-04-04 NOTE — PROGRESS NOTES
ICU Intensivist Attestation:    I saw, examined, and discussed the patient with Sarika ESQUIVEL and agree with his assessment and plan. Patient was transferred because of a possible impending need for intubation which he has decided he would want. However, if in fact this occurs, he will be a very poor candidate for taking him off the ventilator. He is requiring somewhat less oxygen than he has been with an FiO2 of 75% on heated high flow nasal cannula. He does not like BiPAP and is refusing to wear it currently. We have added Precedex to help control his considerable anxiety. The patient's mild hematuria will be managed conservatively, especially considering his considerable respiratory difficulties. Ultimately, if he is able to, cystoscopy is planned by the urology service whose help is much appreciated. Electronically signed by Anabell Sears MD on 4/4/2022 at 3:38 PM  Pulmonary/Critical Care Progress Note    Assessement/Plan:  #  Acute hypoxemic respiratory failure  Currently on heated high flow, FiO2 80%, 60 L/min, SPO2 93%. History of severe COPD, CT of the chest showed possible infected left upper lobe bleb. COVID-19 was positive, however CT findings not typical for COVID-19 pneumonia. ABG showed severe hypoxia without significant CO2 retention. Patient family wanted patient to be intubated if medically necessary. Continue current treatment: Eraxis/meropenem/vancomycin, Pulmicort/Decadron/DuoNeb/vitamins. Hold Lovenox due to hematuria, urology following. Transfer to ICU for further evaluation. #  COVID-19 infection  CT findings not typical for COVID-19 pneumonia, possible focal infected left upper lobe collapse. Continue current treatment for COVID-19 infection including Pulmicort/Decadron/DuoNeb/vitamins. Lovenox on hold due to hematuria. #  Severe COPD with exacerbation. Plan same as above.     #  Left upper lobe pneumonia  CT of the chest showed possible left upper lobe infected bulla. Continue current antibiotics including Eraxis/meropenem/vancomycin. ID following. #  Hematuria  Lovenox on hold, urology following. Case discussed with Dr. Vinicio Chou. SUBJECTIVE:  Patient seen and examined in room. Family at the bedside, patient currently on heated high flow 60 L/min, SPO2 93%, patient breathing faster than usual, reported productive cough with yellowish mucus overnight. Patient has no fever, chills, night sweats. No chest pain, palpitation, or lightheadedness. MEDICATIONS:   dexamethasone  6 mg IntraVENous Q12H    vancomycin  1,000 mg IntraVENous Q18H    anidulafungin  100 mg IntraVENous Q24H    meropenem  1,000 mg IntraVENous Q12H    vitamin B-6  50 mg Oral Daily    thiamine mononitrate  100 mg Oral Daily    vitamin E  400 Units Oral Daily    melatonin  5 mg Oral Nightly    fluticasone  2 spray Each Nostril Daily    pantoprazole  40 mg Oral QAM AC    zinc sulfate  50 mg Oral Daily    ascorbic acid  1,000 mg Oral Daily    [Held by provider] enoxaparin  30 mg SubCUTAneous Daily    ipratropium-albuterol  1 ampule Inhalation Q4H    budesonide  0.5 mg Nebulization BID      sodium chloride Stopped (03/31/22 1436)    0.9% NaCl with KCl 20 mEq 75 mL/hr at 04/04/22 0526     prochlorperazine, acetaminophen      REVIEW OF SYSTEMS:  Constitutional: Denies fever, weight loss, night sweats, and fatigue  Skin: Denies pigmentation, dark lesions, and rashes   HEENT: Denies hearing loss, tinnitus, ear drainage, epistaxis, sore throat, and hoarseness. Cardiovascular: Denies palpitations, chest pain, and chest pressure. Respiratory: Denies hemoptysis, apnea, and choking.   Gastrointestinal: Denies nausea, vomiting, poor appetite, diarrhea, heartburn or reflux  Genitourinary: Denies dysuria, frequency, urgency or hematuria  Musculoskeletal: Denies myalgias, muscle weakness, and bone pain  Neurological: Denies dizziness, vertigo, headache, and focal weakness  Psychological: Denies anxiety and depression  Endocrine: Denies heat intolerance and cold intolerance  Hematopoietic/Lymphatic: Denies bleeding problems and blood transfusions    OBJECTIVE:  Vitals:    04/04/22 0930   BP:    Pulse:    Resp:    Temp:    SpO2: 94%     FiO2 : 93 %  O2 Flow Rate (L/min): 60 L/min  O2 Device: Heated high flow cannula    PHYSICAL EXAM:  Constitutional: Afebrile, tachypnea, labored breathing. Skin: Dry and warm, no rashes. HEENT: Unremarkable  Neck: Trachea midline, no lymphadenopathy or thyromegaly. Cardiovascular: Normal rhythm, normal S1 and S2.  Respiratory: Diffuse wheezing bilaterally. Gastrointestinal: Normal bowel sounds, soft, nontender. Genitourinary: Unremarkable. Extremities: No pulse palpable, no obvious edema. Neurological: A&O x3, no obvious neurological deficit.   Psychological: Anxious affect    LABS:  WBC   Date Value Ref Range Status   04/04/2022 15.0 (H) 4.5 - 11.5 E9/L Final   04/03/2022 12.3 (H) 4.5 - 11.5 E9/L Final   04/02/2022 11.7 (H) 4.5 - 11.5 E9/L Final     Hemoglobin   Date Value Ref Range Status   04/04/2022 12.9 12.5 - 16.5 g/dL Final   04/03/2022 12.5 12.5 - 16.5 g/dL Final   04/02/2022 12.8 12.5 - 16.5 g/dL Final     Hematocrit   Date Value Ref Range Status   04/04/2022 39.6 37.0 - 54.0 % Final   04/03/2022 39.0 37.0 - 54.0 % Final   04/02/2022 40.5 37.0 - 54.0 % Final     MCV   Date Value Ref Range Status   04/04/2022 97.3 80.0 - 99.9 fL Final   04/03/2022 98.2 80.0 - 99.9 fL Final   04/02/2022 99.0 80.0 - 99.9 fL Final     Platelets   Date Value Ref Range Status   04/04/2022 320 130 - 450 E9/L Final   04/03/2022 304 130 - 450 E9/L Final   04/02/2022 294 130 - 450 E9/L Final     Sodium   Date Value Ref Range Status   04/04/2022 138 132 - 146 mmol/L Final   04/03/2022 137 132 - 146 mmol/L Final   04/02/2022 136 132 - 146 mmol/L Final     Potassium   Date Value Ref Range Status   04/04/2022 4.7 3.5 - 5.0 mmol/L Final   04/03/2022 4.5 3.5 - 5.0 mmol/L Final   04/02/2022 4.6 3.5 - 5.0 mmol/L Final     Chloride   Date Value Ref Range Status   04/04/2022 111 (H) 98 - 107 mmol/L Final   04/03/2022 109 (H) 98 - 107 mmol/L Final   04/02/2022 108 (H) 98 - 107 mmol/L Final     CO2   Date Value Ref Range Status   04/04/2022 19 (L) 22 - 29 mmol/L Final   04/03/2022 18 (L) 22 - 29 mmol/L Final   04/02/2022 19 (L) 22 - 29 mmol/L Final     BUN   Date Value Ref Range Status   04/04/2022 27 (H) 6 - 23 mg/dL Final   04/03/2022 30 (H) 6 - 23 mg/dL Final   04/02/2022 32 (H) 6 - 23 mg/dL Final     CREATININE   Date Value Ref Range Status   04/04/2022 1.1 0.7 - 1.2 mg/dL Final   04/03/2022 1.1 0.7 - 1.2 mg/dL Final   04/02/2022 1.1 0.7 - 1.2 mg/dL Final     Glucose   Date Value Ref Range Status   04/04/2022 115 (H) 74 - 99 mg/dL Final   04/03/2022 111 (H) 74 - 99 mg/dL Final   04/02/2022 114 (H) 74 - 99 mg/dL Final     Calcium   Date Value Ref Range Status   04/04/2022 8.0 (L) 8.6 - 10.2 mg/dL Final   04/03/2022 7.7 (L) 8.6 - 10.2 mg/dL Final   04/02/2022 7.9 (L) 8.6 - 10.2 mg/dL Final     Total Protein   Date Value Ref Range Status   04/04/2022 5.6 (L) 6.4 - 8.3 g/dL Final   04/03/2022 5.4 (L) 6.4 - 8.3 g/dL Final   04/02/2022 5.6 (L) 6.4 - 8.3 g/dL Final     Albumin   Date Value Ref Range Status   04/04/2022 3.0 (L) 3.5 - 5.2 g/dL Final   04/03/2022 2.9 (L) 3.5 - 5.2 g/dL Final   04/02/2022 2.9 (L) 3.5 - 5.2 g/dL Final     Total Bilirubin   Date Value Ref Range Status   04/04/2022 0.5 0.0 - 1.2 mg/dL Final   04/03/2022 0.4 0.0 - 1.2 mg/dL Final   04/02/2022 0.4 0.0 - 1.2 mg/dL Final     Alkaline Phosphatase   Date Value Ref Range Status   04/04/2022 60 40 - 129 U/L Final   04/03/2022 56 40 - 129 U/L Final   04/02/2022 53 40 - 129 U/L Final     AST   Date Value Ref Range Status   04/04/2022 27 0 - 39 U/L Final   04/03/2022 28 0 - 39 U/L Final   04/02/2022 30 0 - 39 U/L Final     ALT   Date Value Ref Range Status   04/04/2022 36 0 - 40 U/L Final   04/03/2022 36 0 - 40 U/L of pulmonary embolism. Left apical cavitary lesion, new since the prior examination. Differential   includes infection/cavitating pneumonia including tuberculosis, particularly   given apical location. Underlying malignancy is not excluded. Clinical   correlation recommended. XR CHEST PORTABLE    (Results Pending)       ATTESTATION:  ICU Staff Physician note of personal involvement in Care  As the attending physician, I certify that I personally reviewed the patients history and personally examined the patient to confirm the physical findings described above,  And that I reviewed the relevant imaging studies and available reports. I also discussed the differential diagnosis and all of the proposed management plans with the patient and individuals accompanying the patient to this visit. They had the opportunity to ask questions about the proposed management plans and to have those questions answered. This patient has a high probability of sudden, clinically significant deterioration, which requires the highest level of physician preparedness to intervene urgently. I managed/supervised life or organ supporting interventions that required frequent physician assessment. I devoted my full attention to the direct care of this patient for the amount of time indicated below. Time I spent with the family or surrogate(s) is included only if the patient was incapable of providing the necessary information or participating in medical decisions  Time devoted to teaching and to any procedures I billed separately is not included.     CRITICAL CARE TIME:  39 minutes      Electronically signed by INDIGO Wallace CNP on 4/4/2022 at 10:31 AM

## 2022-04-04 NOTE — PLAN OF CARE
Problem: Falls - Risk of:  Goal: Will remain free from falls  Description: Will remain free from falls  4/4/2022 0943 by Suzie Donovan RN  Outcome: Met This Shift  4/4/2022 0418 by Didier Thomas RN  Outcome: Met This Shift  Goal: Absence of physical injury  Description: Absence of physical injury  4/4/2022 0943 by Suzie Donovan RN  Outcome: Met This Shift  4/4/2022 0418 by Didier Thomas RN  Outcome: Met This Shift     Problem: Airway Clearance - Ineffective  Goal: Achieve or maintain patent airway  4/4/2022 0943 by Suzie Donovan RN  Outcome: Met This Shift  4/4/2022 0418 by Didier Thomas RN  Outcome: Met This Shift     Problem: Gas Exchange - Impaired  Goal: Absence of hypoxia  4/4/2022 0943 by Suzie Donovan RN  Outcome: Met This Shift  4/4/2022 0418 by Didier Thomas RN  Outcome: Met This Shift  Goal: Promote optimal lung function  4/4/2022 0943 by Suzie Donovan RN  Outcome: Met This Shift  4/4/2022 0418 by Didier Thomas RN  Outcome: Met This Shift     Problem: Breathing Pattern - Ineffective  Goal: Ability to achieve and maintain a regular respiratory rate  4/4/2022 0943 by Suzie Donovan RN  Outcome: Met This Shift  4/4/2022 0418 by Didier Thomas RN  Outcome: Met This Shift     Problem: Breathing Pattern - Ineffective  Goal: Ability to achieve and maintain a regular respiratory rate  4/4/2022 0943 by Suzie Donovan RN  Outcome: Met This Shift  4/4/2022 0418 by Didier Thomas RN  Outcome: Met This Shift     Problem:  Body Temperature -  Risk of, Imbalanced  Goal: Ability to maintain a body temperature within defined limits  4/4/2022 0943 by Suzie Donovan RN  Outcome: Met This Shift  4/4/2022 0418 by Didier Thomas RN  Outcome: Met This Shift  Goal: Will regain or maintain usual level of consciousness  4/4/2022 0943 by Suzie Donovan RN  Outcome: Met This Shift  4/4/2022 0418 by Didier Thomas RN  Outcome: Met This Shift  Goal: Complications related to the disease process, condition or treatment will be avoided or minimized  4/4/2022 0943 by Mira Cogan, RN  Outcome: Met This Shift  4/4/2022 0418 by Fidel De Santiago RN  Outcome: Met This Shift     Problem: Isolation Precautions - Risk of Spread of Infection  Goal: Prevent transmission of infection  4/4/2022 0943 by Mira Cogan, RN  Outcome: Met This Shift  4/4/2022 0418 by Fidel De Santiago RN  Outcome: Met This Shift     Problem: Nutrition Deficits  Goal: Optimize nutritional status  4/4/2022 0943 by Mira Cogan, RN  Outcome: Met This Shift  4/4/2022 0418 by Fidel De Santiago RN  Outcome: Met This Shift     Problem: Risk for Fluid Volume Deficit  Goal: Maintain normal heart rhythm  4/4/2022 0943 by Mira Cogan, RN  Outcome: Met This Shift  4/4/2022 0418 by Fidel De Santiago RN  Outcome: Met This Shift  Goal: Maintain absence of muscle cramping  4/4/2022 0943 by Mira Cogan, RN  Outcome: Met This Shift  4/4/2022 0418 by Fidel De Santiago RN  Outcome: Met This Shift  Goal: Maintain normal serum potassium, sodium, calcium, phosphorus, and pH  4/4/2022 0943 by Mira Cogan, RN  Outcome: Met This Shift  4/4/2022 0418 by Fidel De Santiago RN  Outcome: Met This Shift     Problem: Loneliness or Risk for Loneliness  Goal: Demonstrate positive use of time alone when socialization is not possible  4/4/2022 0943 by Mira Cogan, RN  Outcome: Met This Shift  4/4/2022 0418 by Fidel De Santiago RN  Outcome: Met This Shift     Problem: Fatigue  Goal: Verbalize increase energy and improved vitality  4/4/2022 0943 by Mira Cogan, RN  Outcome: Met This Shift  4/4/2022 0418 by Fidel De Santiago RN  Outcome: Met This Shift     Problem: Patient Education: Go to Patient Education Activity  Goal: Patient/Family Education  4/4/2022 8095 by Mira Cogan, RN  Outcome: Met This Shift  4/4/2022 0418 by Fidle De Santiago RN  Outcome: Met This Shift     Problem: Skin Integrity:  Goal: Will show no infection signs and symptoms  Description: Will show no infection signs and symptoms  4/4/2022 0943 by Rubi Waters RN  Outcome: Met This Shift  4/4/2022 0418 by Jason Rushing RN  Outcome: Met This Shift  Goal: Absence of new skin breakdown  Description: Absence of new skin breakdown  4/4/2022 0943 by Rubi Waters RN  Outcome: Met This Shift  4/4/2022 0418 by Jason Rushing RN  Outcome: Met This Shift

## 2022-04-04 NOTE — PLAN OF CARE
Problem: Falls - Risk of:  Goal: Will remain free from falls  Description: Will remain free from falls  Outcome: Met This Shift     Problem: Falls - Risk of:  Goal: Absence of physical injury  Description: Absence of physical injury  Outcome: Met This Shift     Problem: Airway Clearance - Ineffective  Goal: Achieve or maintain patent airway  Outcome: Met This Shift     Problem: Gas Exchange - Impaired  Goal: Absence of hypoxia  Outcome: Met This Shift     Problem: Gas Exchange - Impaired  Goal: Promote optimal lung function  Outcome: Met This Shift     Problem: Breathing Pattern - Ineffective  Goal: Ability to achieve and maintain a regular respiratory rate  Outcome: Met This Shift     Problem: Body Temperature -  Risk of, Imbalanced  Goal: Ability to maintain a body temperature within defined limits  Outcome: Met This Shift     Problem: Body Temperature -  Risk of, Imbalanced  Goal: Will regain or maintain usual level of consciousness  Outcome: Met This Shift     Problem:  Body Temperature -  Risk of, Imbalanced  Goal: Complications related to the disease process, condition or treatment will be avoided or minimized  Outcome: Met This Shift     Problem: Isolation Precautions - Risk of Spread of Infection  Goal: Prevent transmission of infection  Outcome: Met This Shift     Problem: Nutrition Deficits  Goal: Optimize nutritional status  Outcome: Met This Shift     Problem: Risk for Fluid Volume Deficit  Goal: Maintain normal heart rhythm  Outcome: Met This Shift     Problem: Risk for Fluid Volume Deficit  Goal: Maintain absence of muscle cramping  Outcome: Met This Shift     Problem: Risk for Fluid Volume Deficit  Goal: Maintain normal serum potassium, sodium, calcium, phosphorus, and pH  Outcome: Met This Shift     Problem: Loneliness or Risk for Loneliness  Goal: Demonstrate positive use of time alone when socialization is not possible  Outcome: Met This Shift     Problem: Fatigue  Goal: Verbalize increase energy and improved vitality  Outcome: Met This Shift     Problem: Patient Education: Go to Patient Education Activity  Goal: Patient/Family Education  Outcome: Met This Shift     Problem: Skin Integrity:  Goal: Will show no infection signs and symptoms  Description: Will show no infection signs and symptoms  Outcome: Met This Shift     Problem: Skin Integrity:  Goal: Absence of new skin breakdown  Description: Absence of new skin breakdown  Outcome: Met This Shift

## 2022-04-04 NOTE — CARE COORDINATION
SOCIAL WORK / DISCHARGE PLANNING:  COVID positive 3/23 & 3/31. Pt transferred to ICU today. He remains on Aervo 60L fio2 100 % + NRB. Pt resides with spouse, plans to return home and [facility] accepted and is following. HHC orders needed at [facility].                  Electronically signed by NIDHI Angel on 4/4/2022 at 3:59 PM

## 2022-04-04 NOTE — PROGRESS NOTES
Pharmacy Consultation Note  (Antibiotic Dosing and Monitoring)    Initial consult date: 3/31/22  Consulting physician/provider: Dr Miguel Mora  Drug: Vancomycin  Indication: Pneumonia    Age/  Gender Height Weight IBW  Allergy Information   81 y.o./male 5' 10.5\" (179.1 cm) 180 lb (81.6 kg)     Ideal body weight: 74.1 kg (163 lb 7.5 oz)  Adjusted ideal body weight: 75.5 kg (166 lb 8.2 oz)   Penicillins      Renal Function:  Recent Labs     04/02/22  0656 04/03/22  0642 04/04/22  0456   BUN 32* 30* 27*   CREATININE 1.1 1.1 1.1       Intake/Output Summary (Last 24 hours) at 4/4/2022 1637  Last data filed at 4/4/2022 0930  Gross per 24 hour   Intake 1353.38 ml   Output 930 ml   Net 423.38 ml       Vancomycin Monitoring:  Trough:    Recent Labs     04/03/22  0210   VANCOTROUGH 10.9     Random:    No results for input(s): VANCORANDOM in the last 72 hours. Vancomycin Administration Times:  Recent vancomycin administrations                   vancomycin (VANCOCIN) 1,000 mg in dextrose 5 % 250 mL IVPB (mg) 1,000 mg New Bag 04/04/22 1435     1,000 mg New Bag 04/03/22 2039     1,000 mg New Bag  0259     1,000 mg New Bag 04/02/22 0941                  Assessment:  · Patient is a 79 y.o. male who has been initiated on vancomycin  Estimated Creatinine Clearance: 54 mL/min (based on SCr of 1.1 mg/dL).   · To dose vancomycin, pharmacy will be utilizing dosing based off of levels because of patient's renal impairment/insufficiency   · 4/3: Trough @ 0210 = 10.9 mcg/mL;     Plan:  · Vancomycin 1000 mg IV Q18H  · Repeat trough as needed  · Will continue to monitor renal function   · Clinical pharmacy to follow      Verónica Wilkes, PharmD, BCPS 4/4/2022 4:37 PM   [phone]

## 2022-04-04 NOTE — PROGRESS NOTES
At 2130 patient desaturated to 82% on AirVo 72% FiO2. Placed NRB over mask and patient increased to 87% saturation. After reathing exercises, sat increased to 92% and then dropped again to 85%. Increased FiO2 to 92% and saturation increased to 93% on AirVo and NRB. 10 minutes later, NRB removed and patients saturation sustained 92-94% on AirVo. Dr. Martha Tsang notified of PVR results. No further orders. Patient's wife updated on patients status. She would like to be called if anything changes with her .

## 2022-04-04 NOTE — PROGRESS NOTES
Patient attempting to use urinal Spo2 76%- FiO2 increased to 90%- airvo- Respirations labored and tachypenic at 39- education provided and patient placed on bi-pap at this time.

## 2022-04-04 NOTE — PROGRESS NOTES
[address] [place] [address] [facility]  Progress Note    CC: COVID   Face to face encounter   HPI  Pt from home with SOB  Pt was dx with COVID 3/23 unvaccinated  d/c with levaquin received barcitinib     Pt presented with sob/some cough  He denies f/c/n/vd/rash/itch/chest pain/gi or gu issues  Afebrile  Wbc8.8 cr1.9  bipap   CtA neg PE No evidence of pulmonary embolism. Left apical cavitary lesion, new since the prior examination.  Differential   includes infection/cavitating pneumonia including tuberculosis, particularly   given apical location.  Underlying malignancy is not excluded.  Clinical   correlation recommended.       lives with wife  Has cats  previous smoker retired  No travel outside [address]  was in [facility]   no tb exposure     SUBJECTIVE:  In bed  bipap 100% sob         Patient is tolerating medications. No reported adverse drug reactions. ROS: No nausea, vomiting, diarrhea. Has been afebrile.         Medications:  Scheduled Meds:   dexamethasone  6 mg IntraVENous Q12H    vancomycin  1,000 mg IntraVENous Q18H    anidulafungin  100 mg IntraVENous Q24H    meropenem  1,000 mg IntraVENous Q12H    vitamin B-6  50 mg Oral Daily    thiamine mononitrate  100 mg Oral Daily    vitamin E  400 Units Oral Daily    melatonin  5 mg Oral Nightly    fluticasone  2 spray Each Nostril Daily    pantoprazole  40 mg Oral QAM AC    zinc sulfate  50 mg Oral Daily    ascorbic acid  1,000 mg Oral Daily    [Held by provider] enoxaparin  30 mg SubCUTAneous Daily    ipratropium-albuterol  1 ampule Inhalation Q4H    budesonide  0.5 mg Nebulization BID     Continuous Infusions:   sodium chloride Stopped (03/31/22 1436)    0.9% NaCl with KCl 20 mEq 75 mL/hr at 04/04/22 0526     PRN Meds:prochlorperazine, acetaminophen  OBJECTIVE:  Patient Vitals for the past 24 hrs:   BP Temp Temp src Pulse Resp SpO2   04/04/22 0930      94 %   04/04/22 0858      91 %   04/04/22 0857     (!) 35 91 %   04/04/22 0845      (!) 76 %   04/04/22 0815 130/77 98.6 °F (37 °C) Infrared 92 19 96 %   04/03/22 2337      96 %   04/03/22 2319      96 %   04/03/22 2310 130/79 96.6 °F (35.9 °C) Infrared 81 25 98 %   04/03/22 2210      93 %   04/03/22 2135      (!) 86 %   04/03/22 2130      (!) 82 %   04/03/22 1600      91 %   04/03/22 1545      96 %   04/03/22 1500 118/79 96 °F (35.6 °C) Infrared 75 18 94 %     Constitutional:   On  bipap  Skin: Warm and dry. Head: at/nC  Chest:  Dec bs ant b     Cardiovascular: S1 and S2 are rhythmic and regular. Abdomen: Positive bowel sounds to auscultation. Benign to palpation. Extremities:  , + edema.   Cns awake   PIV    Laboratory and Tests Review:  Lab Results   Component Value Date    WBC 15.0 (H) 04/04/2022    WBC 12.3 (H) 04/03/2022    WBC 11.7 (H) 04/02/2022    HGB 12.9 04/04/2022    HCT 39.6 04/04/2022    MCV 97.3 04/04/2022     04/04/2022     Lab Results   Component Value Date    NEUTROABS 14.70 (H) 04/04/2022    NEUTROABS 11.81 (H) 04/03/2022    NEUTROABS 10.85 (H) 04/02/2022     Lab Results   Component Value Date    CRP 0.6 (H) 04/04/2022    CRP 2.2 (H) 04/02/2022    CRP 4.4 (H) 04/01/2022     Lab Results   Component Value Date    SEDRATE 28 (H) 04/01/2022     Lab Results   Component Value Date    ALT 36 04/04/2022    AST 27 04/04/2022    ALKPHOS 60 04/04/2022    BILITOT 0.5 04/04/2022     Lab Results   Component Value Date     04/04/2022    K 4.7 04/04/2022     04/04/2022    CO2 19 04/04/2022    BUN 27 04/04/2022    CREATININE 1.1 04/04/2022    GFRAA >60 04/04/2022    LABGLOM >60 04/04/2022    GLUCOSE 115 04/04/2022    PROT 5.6 04/04/2022    LABALBU 3.0 04/04/2022    CALCIUM 8.0 04/04/2022    BILITOT 0.5 04/04/2022    ALKPHOS 60 04/04/2022    AST 27 04/04/2022    ALT 36 04/04/2022      SARS-COV-2 biomarkers    Recent Labs     04/02/22  0656 04/03/22  0642 04/04/22  0456   CRP 2.2*  --  0.6*   DDIMER 630  -- 628   AST 30 28 27   ALT 37 36 36     Lab Results   Component Value Date    CHOL 168 03/24/2022    TRIG 66 03/24/2022    HDL 45 03/24/2022    LDLCALC 110 03/24/2022    LABVLDL 13 03/24/2022     Lab Results   Component Value Date/Time    VITD25 >120 (H) 03/31/2022 11:21 AM     Radiology:  Reviewed     Microbiology:   3/31/2022- blood cx- no growth to date   3/31/2022- respiratory panel- C OVID++   Strep pneumo and legionella-histo urinary ag neg     ASSESSMENT:  leukocytosis  · COVID -19  Cavitary lung lesion PROB COVID LABS WORK UP PENDING    T-Spot TB Test    Aspergillus Glacto AG Assay    1,3 Beta-D-Glucan   ·   · Unvaccinated  · History of COPD  · Hypoxia   · S/p treatment with Baricitinib   · TORY BETTER     PLAN:  · Currently on vancomycin , meropenem and eraxis- will narrow down once cultures/ labs are back   · DAY 5  · cultures ngtd  · Monitor labs- procal- 0.12   ·   Monitor respiratory status-       Imaging and labs were reviewed. Thank you for involving me in the care of Mary Woodruff. Please do not hesitate to call for any questions or concerns.     Electronically signed by Claudette Crazier, MD on 4/4/2022 at 12:43 PM

## 2022-04-04 NOTE — PLAN OF CARE
Problem: Falls - Risk of:  Goal: Will remain free from falls  Description: Will remain free from falls  4/4/2022 1856 by John Winston RN  Outcome: Met This Shift  4/4/2022 0943 by Ban Palma RN  Outcome: Met This Shift  Goal: Absence of physical injury  Description: Absence of physical injury  4/4/2022 1856 by John Winston RN  Outcome: Met This Shift  4/4/2022 0943 by Ban Palma RN  Outcome: Met This Shift     Problem: Airway Clearance - Ineffective  Goal: Achieve or maintain patent airway  4/4/2022 1856 by John Winston RN  Outcome: Met This Shift  4/4/2022 0943 by Ban Palma RN  Outcome: Met This Shift     Problem: Gas Exchange - Impaired  Goal: Absence of hypoxia  4/4/2022 1856 by John Winston RN  Outcome: Met This Shift  4/4/2022 0943 by Ban Palma RN  Outcome: Met This Shift  Goal: Promote optimal lung function  4/4/2022 1856 by John Winston RN  Outcome: Met This Shift  4/4/2022 0943 by Ban Palma RN  Outcome: Met This Shift     Problem: Breathing Pattern - Ineffective  Goal: Ability to achieve and maintain a regular respiratory rate  4/4/2022 1856 by John Winston RN  Outcome: Met This Shift  4/4/2022 0943 by Ban Palma RN  Outcome: Met This Shift     Problem:  Body Temperature -  Risk of, Imbalanced  Goal: Ability to maintain a body temperature within defined limits  4/4/2022 1856 by John Winston RN  Outcome: Met This Shift  4/4/2022 0943 by Ban Palma RN  Outcome: Met This Shift  Goal: Will regain or maintain usual level of consciousness  4/4/2022 1856 by John Winston RN  Outcome: Met This Shift  4/4/2022 0943 by Ban Palma RN  Outcome: Met This Shift  Goal: Complications related to the disease process, condition or treatment will be avoided or minimized  4/4/2022 1856 by John Winston RN  Outcome: Met This Shift  4/4/2022 0943 by Ban Palma RN  Outcome: Met This Shift     Problem: Isolation Precautions - Risk of Spread of Infection  Goal: Prevent transmission of infection  4/4/2022 1856 by Mukesh Coronado RN  Outcome: Met This Shift  4/4/2022 0943 by Hola Hernandez RN  Outcome: Met This Shift     Problem: Nutrition Deficits  Goal: Optimize nutritional status  4/4/2022 1856 by Mukesh Coronado RN  Outcome: Met This Shift  4/4/2022 0943 by Hola Hernandez RN  Outcome: Met This Shift     Problem: Risk for Fluid Volume Deficit  Goal: Maintain normal heart rhythm  4/4/2022 1856 by Mukesh Coronado RN  Outcome: Met This Shift  4/4/2022 0943 by Hola Hernandez RN  Outcome: Met This Shift  Goal: Maintain absence of muscle cramping  4/4/2022 1856 by Mukesh Coronado RN  Outcome: Met This Shift  4/4/2022 0943 by Hola Hernandez RN  Outcome: Met This Shift  Goal: Maintain normal serum potassium, sodium, calcium, phosphorus, and pH  4/4/2022 1856 by Mukesh Coronado RN  Outcome: Met This Shift  4/4/2022 0943 by Hola Hernandez RN  Outcome: Met This Shift     Problem: Loneliness or Risk for Loneliness  Goal: Demonstrate positive use of time alone when socialization is not possible  4/4/2022 1856 by Mukesh Coronado RN  Outcome: Met This Shift  4/4/2022 0943 by Hola Hernandez RN  Outcome: Met This Shift     Problem: Fatigue  Goal: Verbalize increase energy and improved vitality  4/4/2022 1856 by Mukesh Coronado RN  Outcome: Met This Shift  4/4/2022 0943 by Hola Hernandez RN  Outcome: Met This Shift     Problem: Skin Integrity:  Goal: Will show no infection signs and symptoms  Description: Will show no infection signs and symptoms  4/4/2022 1856 by Mukesh Coronado RN  Outcome: Met This Shift  4/4/2022 0943 by Hola Hernandez RN  Outcome: Met This Shift  Goal: Absence of new skin breakdown  Description: Absence of new skin breakdown  4/4/2022 1856 by Mukesh Coronado RN  Outcome: Met This Shift  4/4/2022 0943 by Hola Hernandez RN  Outcome: Met This Shift

## 2022-04-04 NOTE — PROGRESS NOTES
4/4/2022 8:15 AM  Service: Urology  Group: [facility] ([facility])    Jonathan Hinojosa  [id number]    Subjective:    He is laying in bed  On BiPAP  Report his hematuria has improved  Feels he is urinating well   No family present   urine in bathroom is yellow in urinal     Review of Systems  Constitutional: No fever or chills, tired, weak   Respiratory: +shortness of breath   Cardiovascular: negative for chest pain   Gastrointestinal: negative for abdominal pain, diarrhea, nausea and vomiting   : See above  Derm: negative for rash and skin lesion(s)  Neurological: negative for seizures and tremors  Musculoskeletal: Negative    Psychiatric: Negative   All other reviews are negative      Scheduled Meds:   dexamethasone  6 mg IntraVENous Q12H    vancomycin  1,000 mg IntraVENous Q18H    anidulafungin  100 mg IntraVENous Q24H    meropenem  1,000 mg IntraVENous Q12H    vitamin B-6  50 mg Oral Daily    thiamine mononitrate  100 mg Oral Daily    vitamin E  400 Units Oral Daily    melatonin  5 mg Oral Nightly    fluticasone  2 spray Each Nostril Daily    pantoprazole  40 mg Oral QAM AC    zinc sulfate  50 mg Oral Daily    ascorbic acid  1,000 mg Oral Daily    [Held by provider] enoxaparin  30 mg SubCUTAneous Daily    ipratropium-albuterol  1 ampule Inhalation Q4H    budesonide  0.5 mg Nebulization BID       Objective:  Vitals:    04/03/22 2337   BP:    Pulse:    Resp:    Temp:    SpO2: 96%         Allergies: Penicillins    General Appearance: alert and oriented to person, place and time and in no acute distress  Skin: no rash or erythema  Head: normocephalic and atraumatic  Pulmonary/Chest: normal air movement, on BiPAP  Abdomen: soft, non-tender, non-distended  Genitourinary: no naik  Extremities: no cyanosis, clubbing or edema         Labs:     Recent Labs     04/04/22  0456      K 4.7   *   CO2 19*   BUN 27*   CREATININE 1.1   GLUCOSE 115*   CALCIUM 8.0*       Lab Results   Component Value Date    HGB 12.9 04/04/2022    HCT 39.6 04/04/2022       Lab Results   Component Value Date    PSA 3.39 12/30/2020    PSA 1.61 11/19/2019     Narrative   EXAMINATION:   RETROPERITONEAL ULTRASOUND OF THE KIDNEYS AND URINARY BLADDER       4/3/2022       COMPARISON:   None       HISTORY:   ORDERING SYSTEM PROVIDED HISTORY: gross hematuria   TECHNOLOGIST PROVIDED HISTORY:   Reason for exam:->gross hematuria   What reading provider will be dictating this exam?->CRC       FINDINGS:       Kidneys:       The right kidney measures 9.8 cm in length and the left kidney measures 9.8   cm in length.       The corticomedullary differentiation and cortical thickness is decreased   bilaterally.  Prominence of the central echogenic renal stroma.  No   concerning renal mass or renal cyst.  No intrarenal calcification.  No   hydronephrosis on either side.           Bladder:       No intrinsic mass, intrinsic calcification, nor wall thickening.  Bilateral   ureteric jets seen.           Impression   1.  Bilateral renal cortical thinning.  Otherwise normal appearance of the   kidneys.  No hydronephrosis.       2.  Normal appearance of the imaged bladder.                         Assessment/Plan:  Gross Hematuria     Urine in bathroom appears yellow at this time   Monitor PVRs   Urine culture pending   Antibiotics per primary   Urine cytology pending   Renal ultrasound and KUB unremarkable from  standpoint   Will need outpatient follow up for possible cystoscopy  No further acute  interventions at this time  Call with questions     INDIGO Carmichael - CNP   [facility]  Urology

## 2022-04-05 NOTE — PROGRESS NOTES
Department of Internal Medicine        CHIEF COMPLAINT: Shortness of breath    Reason for Admission: Left apical cavitary pneumonia    HISTORY OF PRESENT ILLNESS:      The patient is a 79 y.o. male who presents with being discharged back on 327 with the patient having a history of being admitted 3/23 with symptoms of shortness of breath for 2 weeks prior to that. Patient was so weak that he was not able to get out of bed 4 days before admission. Patient was tested positive for Covid with the patient never being vaccinated. Patient D-dimer was 534 on that admission. Patient white count was normal with patient having history of chronic hypoxic respiratory failure normally on 3 L nasal cannula from COPD. Patient stated he was feeling back to normal and adamantly request to be discharged home with his wife at the bedside. Patient's white count was normal and on 4 L nasal cannula at rest his O2 sat was 94-97%. Patient did need to increase his O2 to 6 L with activity. Patient stated that he was feeling better but progressively got more short of breath and came to the ED with a CAT scan showing new apical cavitary lesion since prior examination. Chest x-ray back on 3/23 showed a ill-defined patchy medial left upper lobe infiltrate. Patient currently on 15 L high flow nasal cannula with O2 sat 93%. Case discussed with infectious disease. Patient's wife is at the bedside and case discussed. 4/1/2022  Patient seen examined on [facility]. Patient states he feels fairly good today and denies any pain. Patient very very short of breath with any activity. BUN/creatinine 39/1.3 today with improvement from yesterday. WBC 11.0 with a hemoglobin of 13.4. Temperature is 97.9 with heart rate 75 blood pressure 99/58. O2 sat is 90% on heated high flow nasal cannula with FiO2 75% patient viral respiratory panel is positive again for COVID-19. Infectious disease, pulmonary note reviewed.     4/2/2022   Patient seen examined on [facility]. Patient wife is at the bedside and case discussed. Patient sitting up in chair currently. Patient states he feels a little bit better. Patient denies any chest or abdominal pain. He still has a nonproductive cough. Patient is using vest therapy and is tolerating it fairly well. BUN/creatinine 32/1.1 today with CRP improved 2.2. WBC 11.7 hemoglobin 12.8. Temperature 97.6 with heart rate of 56 and blood pressure 102/60. O2 sat 94% on heated high flow nasal cannula with FiO2 of 60%. Urine output is good. 4/3/2022  Patient seen examined on [facility]. Patient's wife and patient's nurse at the bedside and case discussed. Patient lying in bed today with patient having increasing shortness of breath early this morning with the patient O2 saturation decreasing at that time and currently the patient's 100% FiO2 heated high flow nasal cannula. He denies any chest or abdominal pain. He states he otherwise feels okay except for increasing shortness of breath with any activity which includes eating and talking. BUN/creatinine 30/1.1. Liver enzymes normal with a WBC 12.3 and hemoglobin 12.5. D-dimer yesterday was only 630. Temperature 97.8 with a heart rate of 58 blood pressure 116/67. Urinary output ranges 300-500 cc a shift. We will give dietary supplements 4 times a day so the patient does not have to use a much energy eating his meals. Change the chest vest to 4 times a day between 8 AM and 10 PM with the patient refusing to do it at 5 AM in the morning. 4/4/2022  Patient seen examined on [facility]. Patient's wife and son is at the bedside case discussed. Patient denies any problem with any chest pain, abdominal pain, nausea/vomiting, dizziness. Patient complains of shortness of breath with any activity including eating and talking for extended periods of time. Patient with episode of gross hematuria yesterday afternoon. Urology was consulted. BUN/creatinine 27/1.1.   Liver enzymes are normal with WBC 15 and a hemoglobin 12.9. D-dimer is 628. Urinalysis has large amount of blood. Urology, ID notes reviewed. Pending pulmonology evaluation today but nurse practitioner note from earlier was reviewed. 4/5/2022  Patient seen examined in the ICU. Patient was transferred to the ICU yesterday afternoon with the patient having some increased dyspnea. Patient was intubated today. Patient wife is at the bedside and case discussed. BUN/creatinine 27/1.1 with normal electrolytes with repeat procalcitonin 0.11. There is mild elevation of transaminase today compared to yesterday's. WBC 15.8 with hemoglobin 14.4. Temperature is 97.799.1, heart rate 85, blood pressure 148/83 with O2 sat 97% with patient on heated high flow nasal cannula with FiO2 100%. Patient's gross hematuria has improved and notes very very mild pink alternating with normal urine appearance. Patient blood pressure over the last 24 hours is increasing I will restart patient's lisinopril. Change IV fluids with serum potassium 5.0 normal saline at 75 cc an hour. Past Medical History:    Past Medical History:   Diagnosis Date    COPD (chronic obstructive pulmonary disease) ([facility] [address]     Hypertension     Oxygen dependent     2 l doesnt use continuous     Past Surgical History:    Past Surgical History:   Procedure Laterality Date    HERNIA REPAIR      age 61   [facility] SHOULDER ARTHROSCOPY Right 9/9/2020    RIGHT SHOULDER ARTHROSCOPY, SUBACROMIAL DECOMPRESSION,  DEBRIDEMENT LABRIUM AND ROTATOR CUFF, CHONDROPLASTY (ARTHREX) performed by Amie Gandhi DO at [address]       Medications Prior to Admission:    @  Prior to Admission medications    Medication Sig Start Date End Date Taking?  Authorizing Provider   ipratropium-albuterol (DUONEB) 0.5-2.5 (3) MG/3ML SOLN nebulizer solution  11/16/21   Historical Provider, MD   zinc sulfate (ZINCATE) 220 (50 Zn) MG capsule Take 1 capsule by mouth daily  Patient taking differently: Take 100 mg by mouth daily 3/26/22   Edyta Ta DO   ascorbic acid (VITAMIN C) 1000 MG tablet Take 1 tablet by mouth daily 3/26/22   Edyta Ta DO   budesonide (PULMICORT) 0.5 MG/2ML nebulizer suspension inhale 1 vial via nebulizer twice daily 21   Historical Provider, MD   formoterol (PERFOROMIST) 20 MCG/2ML nebulizer solution inhale 1 unit dose via nebulizer twice daily 21   Historical Provider, MD   lisinopril-hydroCHLOROthiazide (PRINZIDE;ZESTORETIC) 20-12.5 MG per tablet TAKE ONE TABLET BY MOUTH DAILY 21   Stephanie Tillman DO   omeprazole (PRILOSEC) 20 MG delayed release capsule TAKE ONE CAPSULE BY MOUTH EVERY MORNING 21   Historical Provider, MD   fluticasone [facility]) 50 MCG/ACT nasal spray 2 sprays by Each Nostril route daily 20   Damian Greaser, APRN - CNP   OXYGEN Inhale 2 L into the lungs intermittent    Historical Provider, MD   Cyanocobalamin (VITAMIN B 12 PO) Take 500 mcg by mouth daily     Historical Provider, MD   Cholecalciferol (VITAMIN D3) 50 MCG (2000 UT) TABS Take 2 tablets by mouth daily    Historical Provider, MD   albuterol sulfate  (90 Base) MCG/ACT inhaler Inhale 2 puffs into the lungs 4 times daily as needed for Wheezing 19   Stephanie Tillman DO       Allergies:  Penicillins    Social History:   Social History     Socioeconomic History    Marital status:      Spouse name: Not on file    Number of children: Not on file    Years of education: Not on file    Highest education level: Not on file   Occupational History    Not on file   Tobacco Use    Smoking status: Former Smoker     Packs/day: 1.00     Years: 60.00     Pack years: 60.00     Quit date: 2016     Years since quittin.3    Smokeless tobacco: Never Used   Vaping Use    Vaping Use: Never used   Substance and Sexual Activity    Alcohol use: No    Drug use: No    Sexual activity: Not on file   Other Topics Concern    Not on file   Social History Narrative    Not on file Social Determinants of Health     Financial Resource Strain: Low Risk     Difficulty of Paying Living Expenses: Not hard at all   Food Insecurity: No Food Insecurity    Worried About Running Out of Food in the Last Year: Never true    Joselin of Food in the Last Year: Never true   Transportation Needs: No Transportation Needs    Lack of Transportation (Medical): No    Lack of Transportation (Non-Medical): No   Physical Activity:     Days of Exercise per Week: Not on file    Minutes of Exercise per Session: Not on file   Stress:     Feeling of Stress : Not on file   Social Connections:     Frequency of Communication with Friends and Family: Not on file    Frequency of Social Gatherings with Friends and Family: Not on file    Attends Yazidism Services: Not on file    Active Member of [address] [place] [address] or Organizations: Not on file    Attends Club or Organization Meetings: Not on file    Marital Status: Not on file   Intimate Partner Violence:     Fear of Current or Ex-Partner: Not on file    Emotionally Abused: Not on file    Physically Abused: Not on file    Sexually Abused: Not on file   Housing Stability:     Unable to Pay for Housing in the Last Year: Not on file    Number of [place] in the Last Year: Not on file    Unstable Housing in the Last Year: Not on file       Family History:   History reviewed. No pertinent family history. REVIEW OF SYSTEMS:    Gen: Patient denies any lightheadedness or dizziness. No LOC or syncope. No fevers or chills. HEENT: No earache, sore throat or nasal congestion. Resp: Denies cough, hemoptysis or sputum production. Cardiac: Denies chest pain,+ SOB,no diaphoresis or palpitations. GI: No nausea, vomiting, diarrhea or constipation. No melena or hematochezia. : No urinary complaints, dysuria, hematuria or frequency. MSK: No extremity weakness, paralysis or paresthesias.      PHYSICAL EXAM:    Vitals:  BP (!) 148/83   Pulse 85   Temp 99.1 °F (37.3 °C) (Bladder)   Resp (!) 36   Ht 5' 10.5\" (1.791 m)   Wt 171 lb 1.2 oz (77.6 kg)   SpO2 97%   BMI 24.20 kg/m²     General:  This is a 79 y.o. yo male who is alert and oriented in moderate respiratory distress  HEENT:  Head is normocephalic and atraumatic, PERRLA, EOMI, mucus membranes moist with no pharyngeal erythema or exudate. Neck:  Supple with no carotid bruits, JVD or thyromegaly.   No cervical adenopathy  CV:  Regular rate and rhythm, no murmurs  Lungs: Coarse decreased breath sounds to auscultation bilaterally with scattered crackles and no wheezes or rhonchi  Abdomen:  Soft, nontender, nondistended, bowel sounds present  Extremities:  No edema, peripheral pulses intact bilaterally  Neuro:  Cranial nerves II-XII grossly intact; motor and sensory function intact with no focal deficits  Skin:  No rashes, lesions or wounds    DATA:  CBC with Differential:    Lab Results   Component Value Date    WBC 15.8 04/05/2022    RBC 4.59 04/05/2022    HGB 14.4 04/05/2022    HCT 44.4 04/05/2022     04/05/2022    MCV 96.7 04/05/2022    MCH 31.4 04/05/2022    MCHC 32.4 04/05/2022    RDW 15.0 04/05/2022    LYMPHOPCT 0.9 04/05/2022    MONOPCT 6.1 04/05/2022    BASOPCT 0.1 04/05/2022    MONOSABS 0.95 04/05/2022    LYMPHSABS 0.16 04/05/2022    EOSABS 0.00 04/05/2022    BASOSABS 0.00 04/05/2022     CMP:    Lab Results   Component Value Date     04/05/2022    K 5.0 04/05/2022     04/05/2022    CO2 20 04/05/2022    BUN 27 04/05/2022    CREATININE 1.1 04/05/2022    GFRAA >60 04/05/2022    LABGLOM >60 04/05/2022    GLUCOSE 123 04/05/2022    PROT 6.2 04/05/2022    LABALBU 3.0 04/05/2022    CALCIUM 8.5 04/05/2022    BILITOT 0.9 04/05/2022    ALKPHOS 76 04/05/2022    AST 44 04/05/2022    ALT 69 04/05/2022     Magnesium:    Lab Results   Component Value Date    MG 2.0 03/24/2022     Phosphorus:  No results found for: PHOS  PT/INR:    Lab Results   Component Value Date    PROTIME 13.4 04/01/2022    INR 1.2 normal saline 20 KCl at 75 cc an hour    Hold Prinzide  IV vancomycin  IV Eraxis  IV Merrem  Decadron 6 mg IV push every 12 hours  Incentive spirometry with flutter valve every hour while awake  Chest vest percussion 4 times daily-8 AM10 PM  High-protein dietary supplements 4 times a day while patient is unable to eat his meals because of his shortness of breath. Transfer to ICU 4/4  Intubated 4/5    Start lisinopril 5 mg p.o. daily  Change IV fluids normal saline 75 cc an hour  Lasix 20 mg IV push x1    CMP, CBC in a.m.     Yo Rivas DO, D.O.  4/5/2022  10:38 AM

## 2022-04-05 NOTE — PROGRESS NOTES
Comprehensive Nutrition Assessment    Type and Reason for Visit:  Initial,Positive Nutrition Screen,RD Nutrition Re-Screen/LOS (ICU Tx to ICU 4/4/2022: [facility])    Nutrition Recommendations/Plan: Monitor Nutrition Progression    Nutrition Assessment:  Pt admits w/ Acute respiratory failure, Caitory lesion of lung, CoVID +PNA w/ H/o COPD. Pt is intubated, NPO. Will monitor nutrition progression    Malnutrition Assessment:  Malnutrition Status: At risk for malnutrition (Comment)    Context:  Chronic Illness     Findings of the 6 clinical characteristics of malnutrition:  Energy Intake:  Mild decrease in energy intake (Comment)  Weight Loss:  1 - Mild weight loss (specify amount and time period) (wt loss -3.93% x1week)     Body Fat Loss:  No significant body fat loss     Muscle Mass Loss:  No significant muscle mass loss    Fluid Accumulation:  No significant fluid accumulation     Strength:  Not Performed    Estimated Daily Nutrient Needs:  Energy (kcal):  3510-7960; Weight Used for Energy Requirements:  Current     Protein (g):  100-115 (x1.3-1.5gm/kg(monitor LFT's)); Weight Used for Protein Requirements:  Ideal        Fluid (ml/day):  per critical care; Method Used for Fluid Requirements:         Nutrition Related Findings:  Intubated,, trace BLE edema, abd WDL, +BS, BUN(H), LFT's elevated, +I/O +4.1L      Wounds:  None       Current Nutrition Therapies:    ADULT DIET;  Regular  ADULT ORAL NUTRITION SUPPLEMENT; Breakfast, AM Snack, Lunch, PM Snack; Standard High Calorie/High Protein Oral Supplement    Anthropometric Measures:  · Height: 5' 11\" (180.3 cm)  · Current Body Weight: 171 lb (77.6 kg) (3/31 bed scale)   · Usual Body Weight: 178 lb (80.7 kg) (3/23/2022 bed scale: sign wt loss x1week -3.93%)     · Ideal Body Weight: 172 lbs; % Ideal Body Weight 99.4 %   · BMI: 23.9  · Adjusted Body Weight:  ; No Adjustment   · BMI Categories: Normal Weight (BMI 22.0 to 24.9) age over 72       Nutrition Diagnosis: · Inadequate oral intake related to impaired respiratory function as evidenced by NPO or clear liquid status due to medical condition,intubation      Nutrition Interventions:   Food and/or Nutrient Delivery:  Continue NPO  Nutrition Education/Counseling:  No recommendation at this time   Coordination of Nutrition Care:  Continue to monitor while inpatient    Goals:  Nutrition Progression       Nutrition Monitoring and Evaluation:      Food/Nutrient Intake Outcomes:  Diet Advancement/Tolerance  Physical Signs/Symptoms Outcomes:  Biochemical Data,Fluid Status or Edema,Hemodynamic Status,Nutrition Focused Physical Findings,Skin,Weight     Discharge Planning:     Too soon to determine     Electronically signed by Omar Fallon RD, LD on 4/5/22 at 2:49 PM EDT    Contact: [phone]

## 2022-04-05 NOTE — PROCEDURES
Brendan Claude is a 79 y.o. male patient. 1. Acute respiratory failure with hypoxia ([address]    2. Cavitary lesion of lung    3. Chronic renal impairment, unspecified CKD stage      Past Medical History:   Diagnosis Date    COPD (chronic obstructive pulmonary disease) (HCC)     Hypertension     Oxygen dependent     2 l doesnt use continuous     Blood pressure (!) 148/83, pulse 85, temperature 99.1 °F (37.3 °C), temperature source Bladder, resp. rate (!) 36, height 5' 10.5\" (1.791 m), weight 171 lb 1.2 oz (77.6 kg), SpO2 97 %. Intubation    Date/Time: 4/5/2022 11:30 AM  Performed by: Sherice Manning MD  Authorized by: Sherice Manning MD   Consent: The procedure was performed in an emergent situation. Verbal consent obtained. Written consent not obtained.   Risks and benefits: risks, benefits and alternatives were discussed  Consent given by: patient  Patient understanding: patient states understanding of the procedure being performed  Patient consent: the patient's understanding of the procedure matches consent given  Procedure consent: procedure consent matches procedure scheduled  Relevant documents: relevant documents present and verified  Test results: test results available and properly labeled  Site marked: the operative site was marked  Imaging studies: imaging studies available  Required items: required blood products, implants, devices, and special equipment available  Patient identity confirmed: arm band and verbally with patient  Indications: respiratory distress,  pulmonary toilet and  hypoxemia  Intubation method: fiberoptic oral  Patient status: sedated  Preoxygenation: BVM  Pretreatment medications: midazolam  Sedatives: etomidate  Laryngoscope size: Mac 3  Tube size: 8.0 mm  Tube type: cuffed  Number of attempts: 1  Cricoid pressure: no  Cords visualized: yes  Post-procedure assessment: chest rise and CO2 detector  Breath sounds: equal  Cuff inflated: yes  ETT to lip: 24 cm  Tube secured with: ETT jacinto  Chest x-ray findings: endotracheal tube in appropriate position          Sherice Manning MD  4/5/2022

## 2022-04-05 NOTE — CARE COORDINATION
4/5/2022 CM review: COVID positive 3/23 & 3/31. Isolation, ICU/precedex gtt, remains on Airvo 60L fio2 100 % + NRB. [facility] accepted and is following. HHC orders needed at dc. May need oxygen at discharge- will speak to family and choice, left VM message. CM following.  Electronically signed by Ray Saavedra RN-BC on 4/5/2022 at 10:40 AM

## 2022-04-05 NOTE — PLAN OF CARE
Problem: Falls - Risk of:  Goal: Will remain free from falls  Description: Will remain free from falls  4/5/2022 0533 by Tom Turcios RN  Outcome: Met This Shift     Problem: Isolation Precautions - Risk of Spread of Infection  Goal: Prevent transmission of infection  4/5/2022 0533 by Tom Turcios RN  Outcome: Met This Shift     Problem: Loneliness or Risk for Loneliness  Goal: Demonstrate positive use of time alone when socialization is not possible  4/5/2022 0533 by Tom Turcios RN  Outcome: Met This Shift

## 2022-04-05 NOTE — PLAN OF CARE
Problem: Falls - Risk of:  Goal: Will remain free from falls  Description: Will remain free from falls  4/5/2022 0917 by John Winston RN  Outcome: Met This Shift  4/5/2022 0533 by Hilary Lara RN  Outcome: Met This Shift  Goal: Absence of physical injury  Description: Absence of physical injury  Outcome: Met This Shift     Problem: Airway Clearance - Ineffective  Goal: Achieve or maintain patent airway  Outcome: Met This Shift     Problem: Gas Exchange - Impaired  Goal: Absence of hypoxia  Outcome: Met This Shift  Goal: Promote optimal lung function  Outcome: Met This Shift     Problem: Breathing Pattern - Ineffective  Goal: Ability to achieve and maintain a regular respiratory rate  Outcome: Met This Shift

## 2022-04-05 NOTE — PROGRESS NOTES
Pharmacy Consultation Note  (Antibiotic Dosing and Monitoring)    Initial consult date: 3/31/22  Consulting physician/provider: Dr. Lety Montez  Drug: Vancomycin  Indication: Pneumonia    Age/  Gender Height Weight IBW  Allergy Information   81 y.o./male 5' 10.5\" (179.1 cm) 180 lb (81.6 kg)     Ideal body weight: 74.1 kg (163 lb 7.5 oz)  Adjusted ideal body weight: 75.5 kg (166 lb 8.2 oz)   Penicillins      Renal Function:  Recent Labs     04/03/22  0642 04/04/22  0456 04/05/22  0509   BUN 30* 27* 27*   CREATININE 1.1 1.1 1.1       Intake/Output Summary (Last 24 hours) at 4/5/2022 1409  Last data filed at 4/5/2022 0532  Gross per 24 hour   Intake 144.62 ml   Output 900 ml   Net -755.38 ml       Vancomycin Monitoring:  Trough:    Recent Labs     04/03/22  0210   VANCOTROUGH 10.9     Random:    No results for input(s): VANCORANDOM in the last 72 hours. Vancomycin Administration Times:  Recent vancomycin administrations                   vancomycin (VANCOCIN) 1,000 mg in dextrose 5 % 250 mL IVPB (mg) 1,000 mg New Bag 04/05/22 1221     1,000 mg New Bag 04/04/22 1435     1,000 mg New Bag 04/03/22 2039     1,000 mg New Bag  0259                  Assessment:  · Patient is a 79 y.o. male who has been initiated on vancomycin  Estimated Creatinine Clearance: 54 mL/min (based on SCr of 1.1 mg/dL). · To dose vancomycin, pharmacy will be utilizing [employer] calculation software for goal AUC/SELMA 400-600 mg/L-hr   · 4/3: Trough @ 0210 = 10.9 mcg/mL;   · 4/5: Patient transferred to the ICU, now intubated    Plan:  · Vancomycin 1000 mg IV Q18H  · Repeat trough tomorrow @ 0230.  Hold dose if trough is >20 mcg/mL  · Will continue to monitor renal function closely  · Clinical pharmacy to follow      Esteban Macario PharmD, BCPS 4/5/2022 2:09 PM   [phone]

## 2022-04-05 NOTE — PROGRESS NOTES
[address] [place] [address] [facility]  Progress Note    CC: COVID   Face to face encounter   HPI  Pt from home with SOB  Pt was dx with COVID 3/23 unvaccinated  d/c with levaquin received barcitinib     Pt presented with sob/some cough  He denies f/c/n/vd/rash/itch/chest pain/gi or gu issues  Afebrile  Wbc8.8 cr1.9  bipap   CtA neg PE No evidence of pulmonary embolism. Left apical cavitary lesion, new since the prior examination.  Differential   includes infection/cavitating pneumonia including tuberculosis, particularly   given apical location.  Underlying malignancy is not excluded.  Clinical   correlation recommended.       lives with wife  Has cats  previous smoker retired  No travel outside [address]  was in [facility]   no tb exposure     SUBJECTIVE:  Transferred to ICU  HFNC 100%  Has cough clear phelgm   No f/c/n/v/d/    Patient is tolerating medications. No reported adverse drug reactions.            Medications:  Scheduled Meds:   sodium chloride flush  5-40 mL IntraVENous 2 times per day    heparin flush  3 mL IntraVENous 2 times per day    dexamethasone  6 mg IntraVENous Q12H    vancomycin  1,000 mg IntraVENous Q18H    anidulafungin  100 mg IntraVENous Q24H    meropenem  1,000 mg IntraVENous Q12H    vitamin B-6  50 mg Oral Daily    thiamine mononitrate  100 mg Oral Daily    vitamin E  400 Units Oral Daily    melatonin  5 mg Oral Nightly    fluticasone  2 spray Each Nostril Daily    pantoprazole  40 mg Oral QAM AC    zinc sulfate  50 mg Oral Daily    ascorbic acid  1,000 mg Oral Daily    enoxaparin  30 mg SubCUTAneous Daily    ipratropium-albuterol  1 ampule Inhalation Q4H    budesonide  0.5 mg Nebulization BID     Continuous Infusions:   dexmedetomidine HCl in NaCl 1.3 mcg/kg/hr (04/05/22 0709)    sodium chloride      sodium chloride Stopped (03/31/22 1436)    0.9% NaCl with KCl 20 mEq Stopped (04/04/22 1259)     PRN Meds:sodium chloride flush, sodium chloride, heparin flush, prochlorperazine, acetaminophen  OBJECTIVE:  Vitals:    04/05/22 0532 04/05/22 0600 04/05/22 0700 04/05/22 0800   BP:  (!) 174/119 (!) 165/100 (!) 148/83   Pulse: 81 88 87 85   Resp: 27 (!) 34 (!) 36 (!) 36   Temp:    99.1 °F (37.3 °C)   TempSrc:    Bladder   SpO2: 95% 91% 93% 97%   Weight:       Height:            Constitutional:   On  hfnc  Skin: Warm and dry. Head: at/nC  Chest:  Dec bs ant b no wheeze hfnc    Cardiovascular: S1 and S2 are rhythmic and regular. Abdomen: Positive bowel sounds to auscultation. Benign to palpation. nt   Extremities:  + edema. from   Cns awake   PIV  Santana cath     Laboratory and Tests Review:  Lab Results   Component Value Date    WBC 15.8 (H) 04/05/2022    WBC 15.0 (H) 04/04/2022    WBC 12.3 (H) 04/03/2022    HGB 14.4 04/05/2022    HCT 44.4 04/05/2022    MCV 96.7 04/05/2022     04/05/2022     Lab Results   Component Value Date    NEUTROABS 14.69 (H) 04/05/2022    NEUTROABS 14.70 (H) 04/04/2022    NEUTROABS 11.81 (H) 04/03/2022     Lab Results   Component Value Date    CRP 0.6 (H) 04/04/2022    CRP 2.2 (H) 04/02/2022    CRP 4.4 (H) 04/01/2022     Lab Results   Component Value Date    SEDRATE 28 (H) 04/01/2022     Lab Results   Component Value Date    ALT 69 (H) 04/05/2022    AST 44 (H) 04/05/2022    ALKPHOS 76 04/05/2022    BILITOT 0.9 04/05/2022     Lab Results   Component Value Date     04/05/2022    K 5.0 04/05/2022     04/05/2022    CO2 20 04/05/2022    BUN 27 04/05/2022    CREATININE 1.1 04/05/2022    GFRAA >60 04/05/2022    LABGLOM >60 04/05/2022    GLUCOSE 123 04/05/2022    PROT 6.2 04/05/2022    LABALBU 3.0 04/05/2022    CALCIUM 8.5 04/05/2022    BILITOT 0.9 04/05/2022    ALKPHOS 76 04/05/2022    AST 44 04/05/2022    ALT 69 04/05/2022      SARS-COV-2 biomarkers    Recent Labs     04/03/22  0642 04/04/22  0456 04/05/22  0509   CRP  --  0.6*  --    DDIMER  --  628  --    AST 28 27 44*   ALT 36 36 69*     Lab Results   Component Value Date CHOL 168 03/24/2022    TRIG 66 03/24/2022    HDL 45 03/24/2022    LDLCALC 110 03/24/2022    LABVLDL 13 03/24/2022     Lab Results   Component Value Date/Time    VITD25 >120 (H) 03/31/2022 11:21 AM     Radiology:  Reviewed     Microbiology:   3/31/2022- blood cx- no growth to date   3/31/2022- respiratory panel- C OVID++   Strep pneumo and legionella-histo urinary ag neg     ASSESSMENT:  leukocytosis  · COVID -19 pneumonia   · History of COPD  · Hypoxia   · Cavitary lung lesion PROB COVID LABS WORK UP PENDING    T-Spot TB Test    Aspergillus Glacto AG Assay    1,3 Beta-D-Glucan   ·   · Unvaccinated  ·   · S/p treatment with Baricitinib   · TORY BETTER     PLAN:  · Currently on vancomycin , meropenem and eraxis- will narrow down once cultures/ labs are back   · DAY 6  · cultures ngtd  · Monitor labs-     Monitor respiratory status-       Imaging and labs were reviewed. Thank you for involving me in the care of Casi Marina. Please do not hesitate to call for any questions or concerns.     Electronically signed by Oliver Kim MD on 4/5/2022 at 9:41 AM

## 2022-04-05 NOTE — PROCEDURES
PEPE power picc Placement 4/5/2022    Product number: ask [id number] mhbv   Lot Number: [id number]   Consult: l,imited access   Ultrasound: yes   Left Brachial vein:                Upper Arm Circumference: 32cm    Size: 5.5fr    Exposed Length: 0   Internal Length: 45cm   Cut: 45cm   Vein Measurement: 0.6cm    Consent obtained  Time out prior to procedure  Risks and benefits explained to SO  Tolerated well  Tip of cath in lower 1/3 SVC @ [facility] via VPS  Brisk blood return  Flushed well and capped  RN notified    Mickey Velázquez RN  4/5/2022  11:54 AM

## 2022-04-05 NOTE — PROGRESS NOTES
IV team called and informed that the patient needs a PICC ASAP. He is deterioating today and may need to be intubated. Per Dr Lv Steiner, the patient needs to have a line ASAP.

## 2022-04-06 NOTE — PROGRESS NOTES
[address] [place] [address] [facility]  Progress Note    CC: COVID   Face to face encounter   HPI  Pt from home with SOB  Pt was dx with COVID 3/23 unvaccinated  d/c with levaquin received barcitinib     Pt presented with sob/some cough  He denies f/c/n/vd/rash/itch/chest pain/gi or gu issues  Afebrile  Wbc8.8 cr1.9  bipap   CtA neg PE No evidence of pulmonary embolism. Left apical cavitary lesion, new since the prior examination.  Differential   includes infection/cavitating pneumonia including tuberculosis, particularly   given apical location.  Underlying malignancy is not excluded.  Clinical   correlation recommended.       lives with wife  Has cats  previous smoker retired  No travel outside [address]  was in [facility]   no tb exposure     SUBJECTIVE:  4/6/2022  Intubated sedated supine  Afebrile ac60%  Wbc9.8 cr1  4/6 covid screen +    4/5/2022  Transferred to ICU  HFNC 100%  Has cough clear phelgm   No f/c/n/v/d/    Patient is tolerating medications. No reported adverse drug reactions.            Medications:  Scheduled Meds:   [Held by provider] enoxaparin  40 mg SubCUTAneous BID    methylPREDNISolone  40 mg IntraVENous Q8H    baricitinib  4 mg Oral Daily    lisinopril  5 mg Oral Daily    pantoprazole (PROTONIX) 40 mg injection  40 mg IntraVENous Daily    sodium chloride flush  5-40 mL IntraVENous 2 times per day    heparin flush  3 mL IntraVENous 2 times per day    anidulafungin  100 mg IntraVENous Q24H    meropenem  1,000 mg IntraVENous Q12H    vitamin B-6  50 mg Oral Daily    thiamine mononitrate  100 mg Oral Daily    vitamin E  400 Units Oral Daily    melatonin  5 mg Oral Nightly    fluticasone  2 spray Each Nostril Daily    zinc sulfate  50 mg Oral Daily    ascorbic acid  1,000 mg Oral Daily    ipratropium-albuterol  1 ampule Inhalation Q4H    budesonide  0.5 mg Nebulization BID     Continuous Infusions:   sodium chloride 75 mL/hr at 04/06/22 0657    sodium chloride      propofol 50 mcg/kg/min (04/06/22 1356)    fentaNYL 5 mcg/ml in 0.9%  ml infusion 100 mcg/hr (04/06/22 0901)    dexmedetomidine HCl in NaCl Stopped (04/05/22 1322)    sodium chloride       PRN Meds:fentanNYL, sodium chloride flush, sodium chloride, heparin flush, prochlorperazine, acetaminophen  OBJECTIVE:  Vitals:    04/06/22 1500 04/06/22 1600 04/06/22 1700 04/06/22 1800   BP: (!) 100/53 (!) 100/50 (!) 92/46 (!) 98/51   Pulse: 61 61 70 54   Resp: 15 15 16 15   Temp:  96.1 °F (35.6 °C)     TempSrc:  Bladder     SpO2: 91% 91% 93% 94%   Weight:       Height:            Constitutional:   Supine vent   Skin: Warm and dry. Head: at/nC  Chest:  Dec bs ant b no wheeze vent   Cardiovascular: S1 and S2 are rhythmic and regular. Abdomen: dec  bowel sounds to auscultation. Benign to palpation. Right ngt  Extremities:  + edema.    Cns sedated  PIcc 4/5 lue  Santana cath     Laboratory and Tests Review:  Lab Results   Component Value Date    WBC 9.6 04/06/2022    WBC 15.8 (H) 04/05/2022    WBC 15.0 (H) 04/04/2022    HGB 10.3 (L) 04/06/2022    HCT 32.1 (L) 04/06/2022    MCV 98.5 04/06/2022     04/06/2022     Lab Results   Component Value Date    NEUTROABS 9.31 (H) 04/06/2022    NEUTROABS 14.69 (H) 04/05/2022    NEUTROABS 14.70 (H) 04/04/2022     Lab Results   Component Value Date    CRP 4.5 (H) 04/06/2022    CRP 0.6 (H) 04/04/2022    CRP 2.2 (H) 04/02/2022     Lab Results   Component Value Date    SEDRATE 28 (H) 04/01/2022     Lab Results   Component Value Date    ALT 34 04/06/2022    AST 19 04/06/2022    ALKPHOS 50 04/06/2022    BILITOT 0.5 04/06/2022     Lab Results   Component Value Date     04/06/2022    K 4.2 04/06/2022     04/06/2022    CO2 24 04/06/2022    BUN 26 04/06/2022    CREATININE 1.0 04/06/2022    GFRAA >60 04/06/2022    LABGLOM >60 04/06/2022    GLUCOSE 247 04/06/2022    PROT 4.8 04/06/2022    LABALBU 2.6 04/06/2022    CALCIUM 7.4 04/06/2022    BILITOT 0.5 04/06/2022 ALKPHOS 50 04/06/2022    AST 19 04/06/2022    ALT 34 04/06/2022      SARS-COV-2 biomarkers    Recent Labs     04/04/22  0456 04/05/22  0509 04/06/22  0419   CRP 0.6*  --  4.5*   PROCAL  --  0.11*  --    DDIMER 628  --  2982   AST 27 44* 19   ALT 36 69* 34     Lab Results   Component Value Date    CHOL 168 03/24/2022    TRIG 66 03/24/2022    HDL 45 03/24/2022    LDLCALC 110 03/24/2022    LABVLDL 13 03/24/2022     Lab Results   Component Value Date/Time    VITD25 >120 (H) 03/31/2022 11:21 AM     Radiology:  Reviewed     Microbiology:   3/31/2022- blood cx- no growth to date   3/31/2022- respiratory panel- C OVID++   Strep pneumo and legionella-histo urinary ag neg     ASSESSMENT:  leukocytosis  · COVID -19 pneumonia   · History of COPD  · Hypoxia   · Respiratory failure vent 4/6   · Cavitary lung lesion PROB COVID LABS WORK UP PENDING    T-Spot TB Test    Aspergillus Glacto AG Assay    1,3 Beta-D-Glucan   ·   · Unvaccinated  ·   · S/p treatment with Baricitinib   · TORY BETTER     PLAN:  · Currently on vancomycin , meropenem and eraxis- will narrow down once cultures/ labs are back   · DAY 7  · resp cx gnr   · Await final cx   · Monitor labs-     Monitor respiratory status-       Imaging and labs were reviewed. Thank you for involving me in the care of Marguerite Porter. Please do not hesitate to call for any questions or concerns.     Electronically signed by Maki Leblanc MD on 4/6/2022 at 6:19 PM

## 2022-04-06 NOTE — PLAN OF CARE
Problem: Airway Clearance - Ineffective  Goal: Achieve or maintain patent airway  Outcome: Met This Shift     Problem: Gas Exchange - Impaired  Goal: Absence of hypoxia  Outcome: Met This Shift     Problem: Gas Exchange - Impaired  Goal: Promote optimal lung function  Outcome: Met This Shift     Problem: Non-Violent Restraints  Goal: No harm/injury to patient while restraints in use  Outcome: Met This Shift     Problem: Non-Violent Restraints  Goal: Patient's dignity will be maintained  Outcome: Met This Shift     Problem: Non-Violent Restraints  Goal: Removal from restraints as soon as assessed to be safe  Outcome: Not Met This Shift

## 2022-04-06 NOTE — PROGRESS NOTES
Pharmacy Consultation Note    Consult date: 4/6/2022  Physician/provider: Dr. Ryan Jiménez has been consulted to evaluate criteria for Baricitinib therapy. Based on the algorithm, the patient DOES currently meet [facility] P[facility] approved Covid-19 treatment criteria for Baricitinib. Note, patient did receive baricitinib from 3/23 - 3/27 (5 doses total). Therefore, scheduled baricitinib x 9 more doses.      Thank you for the consult,  Ludmila Mcconnell, PharmD, BCPS 4/6/2022 1:39 PM   Ext: [phone]

## 2022-04-06 NOTE — PROGRESS NOTES
Pulmonary/Critical Care Progress Note    We are following patient for acute hypoxemic and hypercapnic respiratory failure, severe COPD, COVID-19 infection questionable pneumonitis  Bacterial pneumonitis  Systemic hypertension    SUBJECTIVE:  The patient required intubation yesterday because of overt respiratory failure and inability to oxygenate. He also refused BiPAP many times. Therefore, he was intubated sedated and is now on a ventilator and is much more comfortable. We have been able to wean his FiO2 currently to 0.65 down from 100% yesterday. PEEP of 8 is being administered. Chest x-ray today shows evidence of a left lung infiltrate and a much clear right lung with a great deal of changes consistent with COPD including blebs. A PICC line was requested and placed yesterday because of the very high risk for pneumothorax given using a jugular approach. We will send patient for CT scan of the chest today and consider bronchoscopy tomorrow. We will wean his steroids slowly but for now, we will leave him on current dose of methylprednisone 40 mg IV every 8 hours. Sputum for Gram stain and C&S show evidence of gram-negative rods. His Covid test remains positive. Baricitinib was started since he has not had this before    The patient remains on Eraxis, meropenem but currently not vancomycin because there is no evidence of streptococcal or staphylococcal infection at this time.         MEDICATIONS:   enoxaparin  40 mg SubCUTAneous BID    methylPREDNISolone  40 mg IntraVENous Q8H    baricitinib  4 mg Oral Daily    lisinopril  5 mg Oral Daily    pantoprazole (PROTONIX) 40 mg injection  40 mg IntraVENous Daily    sodium chloride flush  5-40 mL IntraVENous 2 times per day    heparin flush  3 mL IntraVENous 2 times per day    anidulafungin  100 mg IntraVENous Q24H    meropenem  1,000 mg IntraVENous Q12H    vitamin B-6  50 mg Oral Daily    thiamine mononitrate  100 mg Oral Daily    vitamin E  400 Units Oral Daily    melatonin  5 mg Oral Nightly    fluticasone  2 spray Each Nostril Daily    zinc sulfate  50 mg Oral Daily    ascorbic acid  1,000 mg Oral Daily    ipratropium-albuterol  1 ampule Inhalation Q4H    budesonide  0.5 mg Nebulization BID      sodium chloride 75 mL/hr at 04/06/22 0657    sodium chloride      propofol 50 mcg/kg/min (04/06/22 1356)    fentaNYL 5 mcg/ml in 0.9%  ml infusion 100 mcg/hr (04/06/22 0901)    dexmedetomidine HCl in NaCl Stopped (04/05/22 1322)    sodium chloride       fentanNYL, sodium chloride flush, sodium chloride, heparin flush, prochlorperazine, acetaminophen      REVIEW OF SYSTEMS:  The patient is intubated and sedated and is currently not able to answer questions with regard to review of systems  OBJECTIVE:  Vitals:    04/06/22 1300   BP: (!) 94/53   Pulse: 55   Resp: 19   Temp:    SpO2: 94%     FiO2 : 65 %  O2 Flow Rate (L/min): 60 L/min  O2 Device: Ventilator    PHYSICAL EXAM:  Constitutional: No fever, chills, diaphoresis  Skin: No skin rash, no skin breakdown  HEENT: Endotracheal tube secured at lips  Neck: No JVD, lymphadenopathy, thyromegaly  Cardiovascular: S1, S2 regular. No S3 murmurs rubs present  Respiratory: Inspiratory crackles over both posterior lung fields left more than right, few soft wheezes bilaterally noted  Gastrointestinal: Soft flat, nontender.   No hepatosplenomegaly  Genitourinary: No grossly bloody urine  Extremities: No clubbing, cyanosis, or edema  Neurological: Sedated  Psychological: Sedated    LABS:  WBC   Date Value Ref Range Status   04/06/2022 9.6 4.5 - 11.5 E9/L Final   04/05/2022 15.8 (H) 4.5 - 11.5 E9/L Final   04/04/2022 15.0 (H) 4.5 - 11.5 E9/L Final     Hemoglobin   Date Value Ref Range Status   04/06/2022 10.3 (L) 12.5 - 16.5 g/dL Final   04/05/2022 14.4 12.5 - 16.5 g/dL Final   04/04/2022 12.9 12.5 - 16.5 g/dL Final     Hematocrit   Date Value Ref Range Status   04/06/2022 32.1 (L) 37.0 - 54.0 % Final 04/05/2022 44.4 37.0 - 54.0 % Final   04/04/2022 39.6 37.0 - 54.0 % Final     MCV   Date Value Ref Range Status   04/06/2022 98.5 80.0 - 99.9 fL Final   04/05/2022 96.7 80.0 - 99.9 fL Final   04/04/2022 97.3 80.0 - 99.9 fL Final     Platelets   Date Value Ref Range Status   04/06/2022 188 130 - 450 E9/L Final   04/05/2022 300 130 - 450 E9/L Final   04/04/2022 320 130 - 450 E9/L Final     Sodium   Date Value Ref Range Status   04/06/2022 137 132 - 146 mmol/L Final   04/05/2022 138 132 - 146 mmol/L Final   04/04/2022 138 132 - 146 mmol/L Final     Potassium   Date Value Ref Range Status   04/06/2022 4.2 3.5 - 5.0 mmol/L Final   04/05/2022 5.0 3.5 - 5.0 mmol/L Final   04/04/2022 4.7 3.5 - 5.0 mmol/L Final     Chloride   Date Value Ref Range Status   04/06/2022 107 98 - 107 mmol/L Final   04/05/2022 106 98 - 107 mmol/L Final   04/04/2022 111 (H) 98 - 107 mmol/L Final     CO2   Date Value Ref Range Status   04/06/2022 24 22 - 29 mmol/L Final   04/05/2022 20 (L) 22 - 29 mmol/L Final   04/04/2022 19 (L) 22 - 29 mmol/L Final     BUN   Date Value Ref Range Status   04/06/2022 26 (H) 6 - 23 mg/dL Final   04/05/2022 27 (H) 6 - 23 mg/dL Final   04/04/2022 27 (H) 6 - 23 mg/dL Final     CREATININE   Date Value Ref Range Status   04/06/2022 1.0 0.7 - 1.2 mg/dL Final   04/05/2022 1.1 0.7 - 1.2 mg/dL Final   04/04/2022 1.1 0.7 - 1.2 mg/dL Final     Glucose   Date Value Ref Range Status   04/06/2022 247 (H) 74 - 99 mg/dL Final   04/05/2022 123 (H) 74 - 99 mg/dL Final   04/04/2022 115 (H) 74 - 99 mg/dL Final     Calcium   Date Value Ref Range Status   04/06/2022 7.4 (L) 8.6 - 10.2 mg/dL Final   04/05/2022 8.5 (L) 8.6 - 10.2 mg/dL Final   04/04/2022 8.0 (L) 8.6 - 10.2 mg/dL Final     Total Protein   Date Value Ref Range Status   04/06/2022 4.8 (L) 6.4 - 8.3 g/dL Final   04/05/2022 6.2 (L) 6.4 - 8.3 g/dL Final   04/04/2022 5.6 (L) 6.4 - 8.3 g/dL Final     Albumin   Date Value Ref Range Status   04/06/2022 2.6 (L) 3.5 - 5.2 g/dL Final 04/05/2022 3.0 (L) 3.5 - 5.2 g/dL Final   04/04/2022 3.0 (L) 3.5 - 5.2 g/dL Final     Total Bilirubin   Date Value Ref Range Status   04/06/2022 0.5 0.0 - 1.2 mg/dL Final   04/05/2022 0.9 0.0 - 1.2 mg/dL Final   04/04/2022 0.5 0.0 - 1.2 mg/dL Final     Alkaline Phosphatase   Date Value Ref Range Status   04/06/2022 50 40 - 129 U/L Final   04/05/2022 76 40 - 129 U/L Final   04/04/2022 60 40 - 129 U/L Final     AST   Date Value Ref Range Status   04/06/2022 19 0 - 39 U/L Final   04/05/2022 44 (H) 0 - 39 U/L Final   04/04/2022 27 0 - 39 U/L Final     ALT   Date Value Ref Range Status   04/06/2022 34 0 - 40 U/L Final   04/05/2022 69 (H) 0 - 40 U/L Final   04/04/2022 36 0 - 40 U/L Final     GFR Non-   Date Value Ref Range Status   04/06/2022 >60 >=60 mL/min/1.73 Final     Comment:     Chronic Kidney Disease: less than 60 ml/min/1.73 sq.m. Kidney Failure: less than 15 ml/min/1.73 sq.m. Results valid for patients 18 years and older. 04/05/2022 >60 >=60 mL/min/1.73 Final     Comment:     Chronic Kidney Disease: less than 60 ml/min/1.73 sq.m. Kidney Failure: less than 15 ml/min/1.73 sq.m. Results valid for patients 18 years and older. 04/04/2022 >60 >=60 mL/min/1.73 Final     Comment:     Chronic Kidney Disease: less than 60 ml/min/1.73 sq.m. Kidney Failure: less than 15 ml/min/1.73 sq.m. Results valid for patients 18 years and older.        GFR    Date Value Ref Range Status   04/06/2022 >60  Final   04/05/2022 >60  Final   04/04/2022 >60  Final     Magnesium   Date Value Ref Range Status   04/06/2022 2.1 1.6 - 2.6 mg/dL Final   03/24/2022 2.0 1.6 - 2.6 mg/dL Final   03/23/2022 1.9 1.6 - 2.6 mg/dL Final     Phosphorus   Date Value Ref Range Status   04/06/2022 2.8 2.5 - 4.5 mg/dL Final     Recent Labs     04/06/22  0437   PH 7.362   PO2 106.2*   PCO2 40.9   HCO3 22.7   BE -2.5   O2SAT 97.3       RADIOLOGY:  US DUP LOWER EXTREMITIES BILATERAL VENOUS Final Result   No evidence of DVT in either lower extremity. RECOMMENDATIONS:   Unavailable         XR CHEST PORTABLE   Final Result   No interval change         XR CHEST PORTABLE   Final Result   Positioning of tubes as described. Tip of an NG tube is not visualized. .         XR CHEST PORTABLE   Final Result   1. Vague pulmonary lesion within the left upper lobe medially which   represents a small cavitary lesion noted on the prior CT scan of 03/31/2022   2. Emphysematous changes with interstitial fibrosis   3. There is no appreciable superimposed pneumonia. XR ABDOMEN (KUB) (SINGLE AP VIEW)   Final Result   Nonspecific abdomen. XR CHEST PORTABLE   Final Result   1. Persistent rounded opacity within the left upper lobe measuring   approximately 2.5 cm most consistent with a partially cavitary lesion noted   on the patient's prior CT scan of 03/31/2022. This finding is unchanged. 2. Emphysematous changes with interstitial fibrosis. US RETROPERITONEAL COMPLETE   Final Result   1. Bilateral renal cortical thinning. Otherwise normal appearance of the   kidneys. No hydronephrosis. 2.  Normal appearance of the imaged bladder. XR CHEST PORTABLE   Final Result   1. No signs of an acute cardiopulmonary process   2. Coarsened interstitial markings compatible pulmonary fibrotic disease         CTA CHEST W CONTRAST   Final Result   No evidence of pulmonary embolism. Left apical cavitary lesion, new since the prior examination. Differential   includes infection/cavitating pneumonia including tuberculosis, particularly   given apical location. Underlying malignancy is not excluded. Clinical   correlation recommended. XR CHEST PORTABLE    (Results Pending)   CT CHEST WO CONTRAST    (Results Pending)           PROBLEM LIST:  Principal Problem:    Acute respiratory failure with hypoxia (HCC)  Resolved Problems:    * No resolved hospital problems. *      IMPRESSION:  1. Acute hypoxemic and hypercapnic respiratory failure  2. Severe COPD  3. Left lung pneumonia  4. COVID-19 infection  5. Infected left upper lobe bleb  6. Previous hematuria, resolved    PLAN:  1. Initiate tube feedings  2. Wean steroids slowly  3. Continue meropenem and Eraxis per infectious disease  4. Aerosolized bronchodilators  5. GI bleed prophylaxis with Protonix  6. DVT/PE prophylaxis with Lovenox  7. Bronchoscopy tomorrow for diagnostic and therapeutic purposes  8. Continue tube feedings nonfocal morning  9. Hold Lovenox for bronchoscopy  10. PT/INR in the morning  11. Continue baricitinib    ATTESTATION:  ICU Staff Physician note of personal involvement in Care  As the attending physician, I certify that I personally reviewed the patients history and personally examined the patient to confirm the physical findings described above,  And that I reviewed the relevant imaging studies and available reports. I also discussed the differential diagnosis and all of the proposed management plans with the patient and individuals accompanying the patient to this visit. They had the opportunity to ask questions about the proposed management plans and to have those questions answered. This patient has a high probability of sudden, clinically significant deterioration, which requires the highest level of physician preparedness to intervene urgently. I managed/supervised life or organ supporting interventions that required frequent physician assessment. I devoted my full attention to the direct care of this patient for the amount of time indicated below. Time I spent with the family or surrogate(s) is included only if the patient was incapable of providing the necessary information or participating in medical decisions  Time devoted to teaching and to any procedures I billed separately is not included.     CRITICAL CARE TIME:  37 minutes    Electronically signed by Deidra Valdez MD on 4/6/2022 at 2:07 PM

## 2022-04-06 NOTE — PROGRESS NOTES
Pharmacy Consultation Note  (Antibiotic Dosing and Monitoring)    Vancomycin has been discontinued; pharmacy will sign-off. Please reconsult if needed.     Thank you,  Gretchen Segura PharmD, BCPS 4/6/2022 1:38 PM

## 2022-04-06 NOTE — PROGRESS NOTES
Department of Internal Medicine        CHIEF COMPLAINT: Shortness of breath    Reason for Admission: Left apical cavitary pneumonia    HISTORY OF PRESENT ILLNESS:      The patient is a 79 y.o. male who presents with being discharged back on 327 with the patient having a history of being admitted 3/23 with symptoms of shortness of breath for 2 weeks prior to that. Patient was so weak that he was not able to get out of bed 4 days before admission. Patient was tested positive for Covid with the patient never being vaccinated. Patient D-dimer was 534 on that admission. Patient white count was normal with patient having history of chronic hypoxic respiratory failure normally on 3 L nasal cannula from COPD. Patient stated he was feeling back to normal and adamantly request to be discharged home with his wife at the bedside. Patient's white count was normal and on 4 L nasal cannula at rest his O2 sat was 94-97%. Patient did need to increase his O2 to 6 L with activity. Patient stated that he was feeling better but progressively got more short of breath and came to the ED with a CAT scan showing new apical cavitary lesion since prior examination. Chest x-ray back on 3/23 showed a ill-defined patchy medial left upper lobe infiltrate. Patient currently on 15 L high flow nasal cannula with O2 sat 93%. Case discussed with infectious disease. Patient's wife is at the bedside and case discussed. 4/1/2022  Patient seen examined on [facility]. Patient states he feels fairly good today and denies any pain. Patient very very short of breath with any activity. BUN/creatinine 39/1.3 today with improvement from yesterday. WBC 11.0 with a hemoglobin of 13.4. Temperature is 97.9 with heart rate 75 blood pressure 99/58. O2 sat is 90% on heated high flow nasal cannula with FiO2 75% patient viral respiratory panel is positive again for COVID-19. Infectious disease, pulmonary note reviewed.     4/2/2022   Patient seen examined on [facility]. Patient wife is at the bedside and case discussed. Patient sitting up in chair currently. Patient states he feels a little bit better. Patient denies any chest or abdominal pain. He still has a nonproductive cough. Patient is using vest therapy and is tolerating it fairly well. BUN/creatinine 32/1.1 today with CRP improved 2.2. WBC 11.7 hemoglobin 12.8. Temperature 97.6 with heart rate of 56 and blood pressure 102/60. O2 sat 94% on heated high flow nasal cannula with FiO2 of 60%. Urine output is good. 4/3/2022  Patient seen examined on [facility]. Patient's wife and patient's nurse at the bedside and case discussed. Patient lying in bed today with patient having increasing shortness of breath early this morning with the patient O2 saturation decreasing at that time and currently the patient's 100% FiO2 heated high flow nasal cannula. He denies any chest or abdominal pain. He states he otherwise feels okay except for increasing shortness of breath with any activity which includes eating and talking. BUN/creatinine 30/1.1. Liver enzymes normal with a WBC 12.3 and hemoglobin 12.5. D-dimer yesterday was only 630. Temperature 97.8 with a heart rate of 58 blood pressure 116/67. Urinary output ranges 300-500 cc a shift. We will give dietary supplements 4 times a day so the patient does not have to use a much energy eating his meals. Change the chest vest to 4 times a day between 8 AM and 10 PM with the patient refusing to do it at 5 AM in the morning. 4/4/2022  Patient seen examined on [facility]. Patient's wife and son is at the bedside case discussed. Patient denies any problem with any chest pain, abdominal pain, nausea/vomiting, dizziness. Patient complains of shortness of breath with any activity including eating and talking for extended periods of time. Patient with episode of gross hematuria yesterday afternoon. Urology was consulted. BUN/creatinine 27/1.1.   Liver enzymes are normal with WBC 15 and a hemoglobin 12.9. D-dimer is 628. Urinalysis has large amount of blood. Urology, ID notes reviewed. Pending pulmonology evaluation today but nurse practitioner note from earlier was reviewed. 4/5/2022  Patient seen examined in the ICU. Patient was transferred to the ICU yesterday afternoon with the patient having some increased dyspnea. Patient was intubated today. Patient wife is at the bedside and case discussed. BUN/creatinine 27/1.1 with normal electrolytes with repeat procalcitonin 0.11. There is mild elevation of transaminase today compared to yesterday's. WBC 15.8 with hemoglobin 14.4. Temperature is 97.799.1, heart rate 85, blood pressure 148/83 with O2 sat 97% with patient on heated high flow nasal cannula with FiO2 100%. Patient's gross hematuria has improved and notes very very mild pink alternating with normal urine appearance. Patient blood pressure over the last 24 hours is increasing I will restart patient's lisinopril. Change IV fluids with serum potassium 5.0 normal saline at 75 cc an hour. 4/6/2022  Patient seen examined  in the ICU. Case discussed with patient's nurse. Patient seems to be doing about the same. BUN/creatinine was 26/1.0. Fasting blood sugar was 247 today. Procalcitonin 0.11 yesterday. WBC 9.6 hemoglobin 10.3 with normal liver enzymes. Patient was still positive for COVID-19 infection this morning. Temperature 97.2 with heart rate of 53 and blood pressure 93/48. O2 sat 98% with the patient on assist control ventilator with a rate of 14 and 8 of PEEP and FiO2 80%. Patient is having good urine output to the IV Lasix.       Past Medical History:    Past Medical History:   Diagnosis Date    COPD (chronic obstructive pulmonary disease) ([address]     Hypertension     Oxygen dependent     2 l doesnt use continuous     Past Surgical History:    Past Surgical History:   Procedure Laterality Date    HERNIA REPAIR      age 61    SHOULDER ARTHROSCOPY Right 9/9/2020    RIGHT SHOULDER ARTHROSCOPY, SUBACROMIAL DECOMPRESSION,  DEBRIDEMENT LABRIUM AND ROTATOR CUFF, CHONDROPLASTY (ARTHREX) performed by Edwin Dudley DO at [address]       Medications Prior to Admission:    @  Prior to Admission medications    Medication Sig Start Date End Date Taking?  Authorizing Provider   ipratropium-albuterol (DUONEB) 0.5-2.5 (3) MG/3ML SOLN nebulizer solution  11/16/21   Historical Provider, MD   zinc sulfate (ZINCATE) 220 (50 Zn) MG capsule Take 1 capsule by mouth daily  Patient taking differently: Take 100 mg by mouth daily  3/26/22   Yo Round,    ascorbic acid (VITAMIN C) 1000 MG tablet Take 1 tablet by mouth daily 3/26/22   Yo Rivas,    budesonide (PULMICORT) 0.5 MG/2ML nebulizer suspension inhale 1 vial via nebulizer twice daily 12/16/21   Historical Provider, MD   formoterol (PERFOROMIST) 20 MCG/2ML nebulizer solution inhale 1 unit dose via nebulizer twice daily 12/16/21   Historical Provider, MD   lisinopril-hydroCHLOROthiazide (PRINZIDE;ZESTORETIC) 20-12.5 MG per tablet TAKE ONE TABLET BY MOUTH DAILY 11/12/21   Tobi Parish DO   omeprazole (PRILOSEC) 20 MG delayed release capsule TAKE ONE CAPSULE BY MOUTH EVERY MORNING 5/27/21   Historical Provider, MD   fluticasone [address]) 50 MCG/ACT nasal spray 2 sprays by Each Nostril route daily 12/21/20   INDIGO Dodson - CNP   OXYGEN Inhale 2 L into the lungs intermittent    Historical Provider, MD   Cyanocobalamin (VITAMIN B 12 PO) Take 500 mcg by mouth daily     Historical Provider, MD   Cholecalciferol (VITAMIN D3) 50 MCG (2000 UT) TABS Take 2 tablets by mouth daily    Historical Provider, MD   albuterol sulfate  (90 Base) MCG/ACT inhaler Inhale 2 puffs into the lungs 4 times daily as needed for Wheezing 12/24/19   Tobi Parish DO       Allergies:  Penicillins    Social History:   Social History     Socioeconomic History    Marital status:      Spouse name: Not on file    Number of children: Not on file    Years of education: Not on file    Highest education level: Not on file   Occupational History    Not on file   Tobacco Use    Smoking status: Former Smoker     Packs/day: 1.00     Years: 60.00     Pack years: 60.00     Quit date: 2016     Years since quittin.3    Smokeless tobacco: Never Used   Vaping Use    Vaping Use: Never used   Substance and Sexual Activity    Alcohol use: No    Drug use: No    Sexual activity: Not on file   Other Topics Concern    Not on file   Social History Narrative    Not on file     Social Determinants of Health     Financial Resource Strain: Low Risk     Difficulty of Paying Living Expenses: Not hard at all   Food Insecurity: No Food Insecurity    Worried About [address] in the Last Year: Never true    [address] [employer] in the Last Year: Never true   Transportation Needs: No Transportation Needs    Lack of Transportation (Medical): No    Lack of Transportation (Non-Medical): No   Physical Activity:     Days of Exercise per Week: Not on file    Minutes of Exercise per Session: Not on file   Stress:     Feeling of Stress : Not on file   Social Connections:     Frequency of Communication with Friends and Family: Not on file    Frequency of Social Gatherings with Friends and Family: Not on file    Attends Restorationist Services: Not on file    Active Member of [address] [place] [address] or Organizations: Not on file    Attends Club or Organization Meetings: Not on file    Marital Status: Not on file   Intimate Partner Violence:     Fear of Current or Ex-Partner: Not on file    Emotionally Abused: Not on file    Physically Abused: Not on file    Sexually Abused: Not on file   Housing Stability:     Unable to Pay for Housing in the Last Year: Not on file    Number of [place] in the Last Year: Not on file    Unstable Housing in the Last Year: Not on file       Family History:   History reviewed.  No pertinent family history. REVIEW OF SYSTEMS:    Gen: Patient denies any lightheadedness or dizziness. No LOC or syncope. No fevers or chills. HEENT: No earache, sore throat or nasal congestion. Resp: Denies cough, hemoptysis or sputum production. Cardiac: Denies chest pain,+ SOB,no diaphoresis or palpitations. GI: No nausea, vomiting, diarrhea or constipation. No melena or hematochezia. : No urinary complaints, dysuria, hematuria or frequency. MSK: No extremity weakness, paralysis or paresthesias. PHYSICAL EXAM:    Vitals:  BP (!) 93/48   Pulse 53   Temp 97.2 °F (36.2 °C) (Bladder)   Resp 17   Ht 5' 11\" (1.803 m)   Wt 171 lb 1.2 oz (77.6 kg)   SpO2 98%   BMI 23.86 kg/m²     General:  This is a 79 y.o. yo male who is alert and oriented in moderate respiratory distress  HEENT:  Head is normocephalic and atraumatic, PERRLA, EOMI, mucus membranes moist with no pharyngeal erythema or exudate. Neck:  Supple with no carotid bruits, JVD or thyromegaly.   No cervical adenopathy  CV:  Regular rate and rhythm, no murmurs  Lungs: Coarse decreased breath sounds to auscultation bilaterally with scattered crackles and no wheezes or rhonchi  Abdomen:  Soft, nontender, nondistended, bowel sounds present  Extremities:  No edema, peripheral pulses intact bilaterally  Neuro:  Cranial nerves II-XII grossly intact; motor and sensory function intact with no focal deficits  Skin:  No rashes, lesions or wounds    DATA:  CBC with Differential:    Lab Results   Component Value Date    WBC 9.6 04/06/2022    RBC 3.26 04/06/2022    HGB 10.3 04/06/2022    HCT 32.1 04/06/2022     04/06/2022    MCV 98.5 04/06/2022    MCH 31.6 04/06/2022    MCHC 32.1 04/06/2022    RDW 15.0 04/06/2022    LYMPHOPCT 1.8 04/06/2022    MONOPCT 0.9 04/06/2022    BASOPCT 0.1 04/06/2022    MONOSABS 0.10 04/06/2022    LYMPHSABS 0.19 04/06/2022    EOSABS 0.00 04/06/2022    BASOSABS 0.00 04/06/2022     CMP:    Lab Results   Component Value Date    NA 137 04/06/2022    K 4.2 04/06/2022     04/06/2022    CO2 24 04/06/2022    BUN 26 04/06/2022    CREATININE 1.0 04/06/2022    GFRAA >60 04/06/2022    LABGLOM >60 04/06/2022    GLUCOSE 247 04/06/2022    PROT 4.8 04/06/2022    LABALBU 2.6 04/06/2022    CALCIUM 7.4 04/06/2022    BILITOT 0.5 04/06/2022    ALKPHOS 50 04/06/2022    AST 19 04/06/2022    ALT 34 04/06/2022     Magnesium:    Lab Results   Component Value Date    MG 2.1 04/06/2022     Phosphorus:    Lab Results   Component Value Date    PHOS 2.8 04/06/2022     PT/INR:    Lab Results   Component Value Date    PROTIME 13.4 04/01/2022    INR 1.2 04/01/2022     Troponin:  No results found for: TROPONINI  U/A:    Lab Results   Component Value Date    COLORU RED 04/03/2022    PROTEINU TRACE 04/03/2022    PHUR 5.5 04/03/2022    WBCUA NONE 04/03/2022    RBCUA PACKED 04/03/2022    BACTERIA NONE SEEN 04/03/2022    CLARITYU CLOUDY 04/03/2022    SPECGRAV 1.020 04/03/2022    LEUKOCYTESUR Negative 04/03/2022    UROBILINOGEN 0.2 04/03/2022    BILIRUBINUR Negative 04/03/2022    BLOODU LARGE 04/03/2022    GLUCOSEU Negative 04/03/2022     ABG:    Lab Results   Component Value Date    PH 7.362 04/06/2022    PCO2 40.9 04/06/2022    PO2 106.2 04/06/2022    HCO3 22.7 04/06/2022    BE -2.5 04/06/2022    O2SAT 97.3 04/06/2022     HgBA1c:    Lab Results   Component Value Date    LABA1C 5.5 12/01/2021     FLP:    Lab Results   Component Value Date    TRIG 66 03/24/2022    HDL 45 03/24/2022    LDLCALC 110 03/24/2022    LABVLDL 13 03/24/2022     TSH:    Lab Results   Component Value Date    TSH 1.070 03/24/2022     IRON:  No results found for: IRON  LIPASE:  No results found for: LIPASE    ASSESSMENT AND PLAN:      Patient Active Problem List    Diagnosis Date Noted    Acute respiratory failure with hypoxia ([facility] 03/31/2022    COPD exacerbation ([facility] 03/23/2022    COVID-19 03/23/2022    Chronic bronchitis ([facility] 04/20/2021    Shoulder impingement, right 08/17/2020    Nontraumatic complete tear of right rotator cuff 08/17/2020     Impression:  1. Acute on chronic hypoxic respiratory failure  2. Left upper lobe infected bleb- pneumonia  3. History of COVID-19 infection March 23, repeat PCR was positive on 3/31  4. History of COPD  5. History hypertension-with hypotension on admission  6. Acute kidney injury-resolved  7. History of chronic kidney disease stage 23a  8. Oral thrush  9. Gross hematuria    Plan:  Admit to [facility]  Home medications reviewed  Monitor heart rate, blood pressure, O2 saturation  Consult ID  Consult pulmonology  Lovenox 40 mg subcu daily  General diet  Activity as tolerated  Heated high flow nasal cannula  IV fluids normal saline 20 KCl at 75 cc an hour    Hold Prinzide  IV vancomycin  IV Eraxis  IV Merrem  Decadron 6 mg IV push every 12 hours  Incentive spirometry with flutter valve every hour while awake  Chest vest percussion 4 times daily-8 AM10 PM  High-protein dietary supplements 4 times a day while patient is unable to eat his meals because of his shortness of breath. Transfer to ICU 4/4  Intubated 4/5    Start lisinopril 5 mg p.o. daily  Change IV fluids normal saline 75 cc an hour    CMP, CBC in a.m.     Yuri Cherry DO, D.OKimberlyn  4/6/2022  10:04 AM

## 2022-04-06 NOTE — CARE COORDINATION
4/6/2022 CM review: COVID positive 3/23 & 3/31. ICU/ intubated 4/5/2022, sedation, fio2 at 80%, peep 8. [facility] accepted and is following. HHC orders needed at dc. May need oxygen at discharge- will speak to family and choice, left VM message yesterday- will attempt to reach.  CM following.  Electronically signed by Ruddy Ambriz RN-BC on 4/6/2022 at 11:50 AM

## 2022-04-07 NOTE — CARE COORDINATION
4/7/2022 Positive covid (3/23 original) 4/6/2022.  ICU/ intubated 4/5/2022, sedation, fio2 at 75%, peep 8. Iv Eraxis and Merrem. - new picc. Off unit for CT lungs, pt for possible Bronch today.  [facility] accepted and is following. Pt has oxygen with [employer] at home. Cm following.  Electronically signed by TAINA Salamanca on 4/7/2022 at 10:07 AM

## 2022-04-07 NOTE — PLAN OF CARE
Problem: Non-Violent Restraints  Goal: No harm/injury to patient while restraints in use  4/7/2022 1629 by Yu Rosenberg RN  Outcome: Met This Shift     Problem: Non-Violent Restraints  Goal: Patient's dignity will be maintained  4/7/2022 1629 by Yu Rosenberg RN  Outcome: Met This Shift     Problem: Non-Violent Restraints  Goal: Removal from restraints as soon as assessed to be safe  4/7/2022 1629 by Yu Rosenberg RN  Outcome: Not Met This Shift

## 2022-04-07 NOTE — PROGRESS NOTES
Department of Internal Medicine        CHIEF COMPLAINT: Shortness of breath    Reason for Admission: Left apical cavitary pneumonia    HISTORY OF PRESENT ILLNESS:      The patient is a 79 y.o. male who presents with being discharged back on 327 with the patient having a history of being admitted 3/23 with symptoms of shortness of breath for 2 weeks prior to that. Patient was so weak that he was not able to get out of bed 4 days before admission. Patient was tested positive for Covid with the patient never being vaccinated. Patient D-dimer was 534 on that admission. Patient white count was normal with patient having history of chronic hypoxic respiratory failure normally on 3 L nasal cannula from COPD. Patient stated he was feeling back to normal and adamantly request to be discharged home with his wife at the bedside. Patient's white count was normal and on 4 L nasal cannula at rest his O2 sat was 94-97%. Patient did need to increase his O2 to 6 L with activity. Patient stated that he was feeling better but progressively got more short of breath and came to the ED with a CAT scan showing new apical cavitary lesion since prior examination. Chest x-ray back on 3/23 showed a ill-defined patchy medial left upper lobe infiltrate. Patient currently on 15 L high flow nasal cannula with O2 sat 93%. Case discussed with infectious disease. Patient's wife is at the bedside and case discussed. 4/1/2022  Patient seen examined on [facility]. Patient states he feels fairly good today and denies any pain. Patient very very short of breath with any activity. BUN/creatinine 39/1.3 today with improvement from yesterday. WBC 11.0 with a hemoglobin of 13.4. Temperature is 97.9 with heart rate 75 blood pressure 99/58. O2 sat is 90% on heated high flow nasal cannula with FiO2 75% patient viral respiratory panel is positive again for COVID-19. Infectious disease, pulmonary note reviewed.     4/2/2022   Patient seen examined on [facility]. Patient wife is at the bedside and case discussed. Patient sitting up in chair currently. Patient states he feels a little bit better. Patient denies any chest or abdominal pain. He still has a nonproductive cough. Patient is using vest therapy and is tolerating it fairly well. BUN/creatinine 32/1.1 today with CRP improved 2.2. WBC 11.7 hemoglobin 12.8. Temperature 97.6 with heart rate of 56 and blood pressure 102/60. O2 sat 94% on heated high flow nasal cannula with FiO2 of 60%. Urine output is good. 4/3/2022  Patient seen examined on [facility]. Patient's wife and patient's nurse at the bedside and case discussed. Patient lying in bed today with patient having increasing shortness of breath early this morning with the patient O2 saturation decreasing at that time and currently the patient's 100% FiO2 heated high flow nasal cannula. He denies any chest or abdominal pain. He states he otherwise feels okay except for increasing shortness of breath with any activity which includes eating and talking. BUN/creatinine 30/1.1. Liver enzymes normal with a WBC 12.3 and hemoglobin 12.5. D-dimer yesterday was only 630. Temperature 97.8 with a heart rate of 58 blood pressure 116/67. Urinary output ranges 300-500 cc a shift. We will give dietary supplements 4 times a day so the patient does not have to use a much energy eating his meals. Change the chest vest to 4 times a day between 8 AM and 10 PM with the patient refusing to do it at 5 AM in the morning. 4/4/2022  Patient seen examined on [facility]. Patient's wife and son is at the bedside case discussed. Patient denies any problem with any chest pain, abdominal pain, nausea/vomiting, dizziness. Patient complains of shortness of breath with any activity including eating and talking for extended periods of time. Patient with episode of gross hematuria yesterday afternoon. Urology was consulted. BUN/creatinine 27/1.1.   Liver enzymes are normal with WBC 15 and a hemoglobin 12.9. D-dimer is 628. Urinalysis has large amount of blood. Urology, ID notes reviewed. Pending pulmonology evaluation today but nurse practitioner note from earlier was reviewed. 4/5/2022  Patient seen examined in the ICU. Patient was transferred to the ICU yesterday afternoon with the patient having some increased dyspnea. Patient was intubated today. Patient wife is at the bedside and case discussed. BUN/creatinine 27/1.1 with normal electrolytes with repeat procalcitonin 0.11. There is mild elevation of transaminase today compared to yesterday's. WBC 15.8 with hemoglobin 14.4. Temperature is 97.799.1, heart rate 85, blood pressure 148/83 with O2 sat 97% with patient on heated high flow nasal cannula with FiO2 100%. Patient's gross hematuria has improved and notes very very mild pink alternating with normal urine appearance. Patient blood pressure over the last 24 hours is increasing I will restart patient's lisinopril. Change IV fluids with serum potassium 5.0 normal saline at 75 cc an hour. 4/6/2022  Patient seen examined  in the ICU. Case discussed with patient's nurse. Patient seems to be doing about the same. BUN/creatinine was 26/1.0. Fasting blood sugar was 247 today. Procalcitonin 0.11 yesterday. WBC 9.6 hemoglobin 10.3 with normal liver enzymes. Patient was still positive for COVID-19 infection this morning. Temperature 97.2 with heart rate of 53 and blood pressure 93/48. O2 sat 98% with the patient on assist control ventilator with a rate of 14 and 8 of PEEP and FiO2 80%. Patient is having good urine output to the IV Lasix. 4/7/2022  Patient seen examined in the ICU. Case discussed with patient's wife at bedside. He has remained patient today alert and oriented BUN/creatinine 25/0.9. Liver enzymes normal with a WBC 10.3 and hemoglobin 10.7.   Temperature 97 degrees with heart rate of 70 blood pressure 99/55 with patient's O2 saturation is 95% with Inhale 2 L into the lungs intermittent    Historical Provider, MD   Cyanocobalamin (VITAMIN B 12 PO) Take 500 mcg by mouth daily     Historical Provider, MD   Cholecalciferol (VITAMIN D3) 50 MCG (2000 UT) TABS Take 2 tablets by mouth daily    Historical Provider, MD   albuterol sulfate  (90 Base) MCG/ACT inhaler Inhale 2 puffs into the lungs 4 times daily as needed for Wheezing 19   Geovanicrispin GaitanDO miriam       Allergies:  Penicillins    Social History:   Social History     Socioeconomic History    Marital status:      Spouse name: Not on file    Number of children: Not on file    Years of education: Not on file    Highest education level: Not on file   Occupational History    Not on file   Tobacco Use    Smoking status: Former Smoker     Packs/day: 1.00     Years: 60.00     Pack years: 60.00     Quit date: 2016     Years since quittin.3    Smokeless tobacco: Never Used   Vaping Use    Vaping Use: Never used   Substance and Sexual Activity    Alcohol use: No    Drug use: No    Sexual activity: Not on file   Other Topics Concern    Not on file   Social History Narrative    Not on file     Social Determinants of Health     Financial Resource Strain: Low Risk     Difficulty of Paying Living Expenses: Not hard at all   Food Insecurity: No Food Insecurity    Worried About [address] in the Last Year: Never true    [address] in the Last Year: Never true   Transportation Needs: No Transportation Needs    Lack of Transportation (Medical): No    Lack of Transportation (Non-Medical):  No   Physical Activity:     Days of Exercise per Week: Not on file    Minutes of Exercise per Session: Not on file   Stress:     Feeling of Stress : Not on file   Social Connections:     Frequency of Communication with Friends and Family: Not on file    Frequency of Social Gatherings with Friends and Family: Not on file    Attends [employer] Services: Not on file    Active Member of Clubs or Organizations: Not on file    Attends Club or Organization Meetings: Not on file    Marital Status: Not on file   Intimate Partner Violence:     Fear of Current or Ex-Partner: Not on file    Emotionally Abused: Not on file    Physically Abused: Not on file    Sexually Abused: Not on file   Housing Stability:     Unable to Pay for Housing in the Last Year: Not on file    Number of [place] in the Last Year: Not on file    Unstable Housing in the Last Year: Not on file       Family History:   History reviewed. No pertinent family history. REVIEW OF SYSTEMS:    Gen: Patient denies any lightheadedness or dizziness. No LOC or syncope. No fevers or chills. HEENT: No earache, sore throat or nasal congestion. Resp: Denies cough, hemoptysis or sputum production. Cardiac: Denies chest pain,+ SOB,no diaphoresis or palpitations. GI: No nausea, vomiting, diarrhea or constipation. No melena or hematochezia. : No urinary complaints, dysuria, hematuria or frequency. MSK: No extremity weakness, paralysis or paresthesias. PHYSICAL EXAM:    Vitals:  /60   Pulse 76   Temp 97 °F (36.1 °C) (Bladder)   Resp (!) 33   Ht 5' 11\" (1.803 m)   Wt 171 lb 1.2 oz (77.6 kg)   SpO2 (!) 87%   BMI 23.86 kg/m²     General:  This is a 79 y.o. yo male who is alert and oriented in moderate respiratory distress  HEENT:  Head is normocephalic and atraumatic, PERRLA, EOMI, mucus membranes moist with no pharyngeal erythema or exudate. Neck:  Supple with no carotid bruits, JVD or thyromegaly.   No cervical adenopathy  CV:  Regular rate and rhythm, no murmurs  Lungs: Coarse decreased breath sounds to auscultation bilaterally with scattered crackles and no wheezes or rhonchi  Abdomen:  Soft, nontender, nondistended, bowel sounds present  Extremities:  No edema, peripheral pulses intact bilaterally  Neuro:  Cranial nerves II-XII grossly intact; motor and sensory function intact with no focal deficits  Skin:  No rashes, lesions or wounds    DATA:  CBC with Differential:    Lab Results   Component Value Date    WBC 10.3 04/07/2022    RBC 3.39 04/07/2022    HGB 10.7 04/07/2022    HCT 34.0 04/07/2022     04/07/2022    .3 04/07/2022    MCH 31.6 04/07/2022    MCHC 31.5 04/07/2022    RDW 15.3 04/07/2022    LYMPHOPCT 2.6 04/07/2022    MONOPCT 0.9 04/07/2022    BASOPCT 0.1 04/07/2022    MONOSABS 0.10 04/07/2022    LYMPHSABS 0.31 04/07/2022    EOSABS 0.00 04/07/2022    BASOSABS 0.00 04/07/2022     CMP:    Lab Results   Component Value Date     04/07/2022    K 4.0 04/07/2022     04/07/2022    CO2 22 04/07/2022    BUN 25 04/07/2022    CREATININE 0.9 04/07/2022    GFRAA >60 04/07/2022    LABGLOM >60 04/07/2022    GLUCOSE 151 04/07/2022    PROT 5.0 04/07/2022    LABALBU 2.6 04/07/2022    CALCIUM 7.4 04/07/2022    BILITOT 0.3 04/07/2022    ALKPHOS 57 04/07/2022    AST 37 04/07/2022    ALT 36 04/07/2022     Magnesium:    Lab Results   Component Value Date    MG 2.2 04/07/2022     Phosphorus:    Lab Results   Component Value Date    PHOS 2.5 04/07/2022     PT/INR:    Lab Results   Component Value Date    PROTIME 13.4 04/01/2022    INR 1.2 04/01/2022     Troponin:  No results found for: TROPONINI  U/A:    Lab Results   Component Value Date    COLORU RED 04/03/2022    PROTEINU TRACE 04/03/2022    PHUR 5.5 04/03/2022    WBCUA NONE 04/03/2022    RBCUA PACKED 04/03/2022    BACTERIA NONE SEEN 04/03/2022    CLARITYU CLOUDY 04/03/2022    SPECGRAV 1.020 04/03/2022    LEUKOCYTESUR Negative 04/03/2022    UROBILINOGEN 0.2 04/03/2022    BILIRUBINUR Negative 04/03/2022    BLOODU LARGE 04/03/2022    GLUCOSEU Negative 04/03/2022     ABG:    Lab Results   Component Value Date    PH 7.326 04/07/2022    PCO2 45.2 04/07/2022    PO2 65.4 04/07/2022    HCO3 23.1 04/07/2022    BE -2.9 04/07/2022    O2SAT 91.1 04/07/2022     HgBA1c:    Lab Results   Component Value Date    LABA1C 5.5 12/01/2021     FLP:    Lab Results Component Value Date    TRIG 66 03/24/2022    HDL 45 03/24/2022    LDLCALC 110 03/24/2022    LABVLDL 13 03/24/2022     TSH:    Lab Results   Component Value Date    TSH 1.070 03/24/2022     IRON:  No results found for: IRON  LIPASE:  No results found for: LIPASE    ASSESSMENT AND PLAN:      Patient Active Problem List    Diagnosis Date Noted    Acute respiratory failure with hypoxia ([facility] [address] 03/31/2022    COPD exacerbation ([facility] [address] 03/23/2022    COVID-19 03/23/2022    Chronic bronchitis ([facility] [address] 04/20/2021    Shoulder impingement, right 08/17/2020    Nontraumatic complete tear of right rotator cuff 08/17/2020     Impression:  1. Acute on chronic hypoxic respiratory failure  2. Left upper lobe pneumonia-gram-negative  3. History of COVID-19 infection March 23, repeat PCR was positive on 3/31  4. History of severe COPD  5. History hypertension-with hypotension on admission  6. Acute kidney injury-resolved  7. History of chronic kidney disease stage 23a  8. Oral thrush  9. Gross hematuria    Plan:  Admit to [address]  Home medications reviewed  Monitor heart rate, blood pressure, O2 saturation  Consult ID  Consult pulmonology  Lovenox 40 mg subcu daily  General diet  Activity as tolerated  Heated high flow nasal cannula  IV fluids normal saline 20 KCl at 75 cc an hour    Hold Prinzide  IV vancomycin  IV Eraxis  IV Merrem  Decadron 6 mg IV push every 12 hours  Incentive spirometry with flutter valve every hour while awake  Chest vest percussion 4 times daily-8 AM10 PM  High-protein dietary supplements 4 times a day while patient is unable to eat his meals because of his shortness of breath. Transfer to ICU 4/4  Intubated 4/5    Baricitinib 4 mg p.o. daily-4/6  Stop lisinopril 5 mg p.o. daily  Change IV fluids normal saline 75 cc an hour    CMP, CBC in a.m.     Josue Conte DO, D.O.  4/7/2022  11:38 AM

## 2022-04-07 NOTE — PROGRESS NOTES
Pulmonary/Critical Care Progress Note    We are following patient for hypoxemic and hypercapnic respiratory failure, severe interstitial lung disease superimposed on advanced COPD, COVID-19 infection, gram-negative rods in sputum, systemic hypertension, pneumonitis    SUBJECTIVE:  Patient FiO2 is at 75% which is too high to safely perform bronchoscopy without causing respiratory distress and severe hypoxemia. However, sputum is growing gram-negative rods but the patient is currently on meropenem at this time. Wheezing has improved so that I believe we can reduce the IV steroid dose. He is tolerating tube feedings, blood pressure medication, baricitinib, and Protonix well at this point. He is also being maintained on antifungal agents such as Eraxis. If his FiO2 can be reduced to approximately 50%, we will proceed with bronchoscopy. Because there are large infiltrates in the posterior aspect of lower lobe, it may be useful to prone the patient and see if his FiO2 can be reduced. His wife understands possibility of the patient is nonverbal his prognosis is probably limited given the advanced lung disease is currently demonstrating. We will continue current aggressive management and attempt to make as much progress as clinically possible.     MEDICATIONS:   [Held by provider] enoxaparin  40 mg SubCUTAneous BID    methylPREDNISolone  40 mg IntraVENous Q8H    baricitinib  4 mg Oral Daily    lisinopril  5 mg Oral Daily    pantoprazole (PROTONIX) 40 mg injection  40 mg IntraVENous Daily    sodium chloride flush  5-40 mL IntraVENous 2 times per day    heparin flush  3 mL IntraVENous 2 times per day    anidulafungin  100 mg IntraVENous Q24H    meropenem  1,000 mg IntraVENous Q12H    vitamin B-6  50 mg Oral Daily    thiamine mononitrate  100 mg Oral Daily    vitamin E  400 Units Oral Daily    melatonin  5 mg Oral Nightly    fluticasone  2 spray Each Nostril Daily    zinc sulfate  50 mg Oral Daily  ascorbic acid  1,000 mg Oral Daily    ipratropium-albuterol  1 ampule Inhalation Q4H    budesonide  0.5 mg Nebulization BID      sodium chloride 75 mL/hr at 04/06/22 2024    propofol 50 mcg/kg/min (04/07/22 0646)    sodium chloride      [Held by provider] propofol 50 mcg/kg/min (04/06/22 1823)    fentaNYL 5 mcg/ml in 0.9%  ml infusion 100 mcg/hr (04/07/22 0106)    dexmedetomidine HCl in NaCl Stopped (04/05/22 1322)    sodium chloride       fentanNYL, sodium chloride flush, sodium chloride, heparin flush, prochlorperazine, acetaminophen      REVIEW OF SYSTEMS:  Patient cannot answer questions regarding review of systems as is sedated and intubated  OBJECTIVE:  Vitals:    04/07/22 0700   BP: 104/60   Pulse: 76   Resp: (!) 33   Temp:    SpO2: (!) 87%     FiO2 : 75 %  O2 Flow Rate (L/min): 60 L/min  O2 Device: Ventilator    PHYSICAL EXAM:  Constitutional: No fever, chills, diaphoresis  Skin: No skin rash, no skin breakdown  HEENT: Endotracheal tube secured at lips  Neck: JVD, lymphadenopathy, thyromegaly  Cardiovascular: S1, S2 regular.   No S3 murmurs rubs present  Respiratory: Insp crackles, soft wheezes bilat  Gastrointestinal: soft, flat, nontender  Genitourinary: non bloody urine  Extremities: no clubbing, cyanosis, edema  Neurological: sedated  Psychological: sedated    LABS:  WBC   Date Value Ref Range Status   04/07/2022 10.3 4.5 - 11.5 E9/L Final   04/06/2022 9.6 4.5 - 11.5 E9/L Final   04/05/2022 15.8 (H) 4.5 - 11.5 E9/L Final     Hemoglobin   Date Value Ref Range Status   04/07/2022 10.7 (L) 12.5 - 16.5 g/dL Final   04/06/2022 10.3 (L) 12.5 - 16.5 g/dL Final   04/05/2022 14.4 12.5 - 16.5 g/dL Final     Hematocrit   Date Value Ref Range Status   04/07/2022 34.0 (L) 37.0 - 54.0 % Final   04/06/2022 32.1 (L) 37.0 - 54.0 % Final   04/05/2022 44.4 37.0 - 54.0 % Final     MCV   Date Value Ref Range Status   04/07/2022 100.3 (H) 80.0 - 99.9 fL Final   04/06/2022 98.5 80.0 - 99.9 fL Final 04/05/2022 96.7 80.0 - 99.9 fL Final     Platelets   Date Value Ref Range Status   04/07/2022 173 130 - 450 E9/L Final   04/06/2022 188 130 - 450 E9/L Final   04/05/2022 300 130 - 450 E9/L Final     Sodium   Date Value Ref Range Status   04/07/2022 140 132 - 146 mmol/L Final   04/06/2022 137 132 - 146 mmol/L Final   04/05/2022 138 132 - 146 mmol/L Final     Potassium   Date Value Ref Range Status   04/07/2022 4.0 3.5 - 5.0 mmol/L Final   04/06/2022 4.2 3.5 - 5.0 mmol/L Final   04/05/2022 5.0 3.5 - 5.0 mmol/L Final     Chloride   Date Value Ref Range Status   04/07/2022 111 (H) 98 - 107 mmol/L Final   04/06/2022 107 98 - 107 mmol/L Final   04/05/2022 106 98 - 107 mmol/L Final     CO2   Date Value Ref Range Status   04/07/2022 22 22 - 29 mmol/L Final   04/06/2022 24 22 - 29 mmol/L Final   04/05/2022 20 (L) 22 - 29 mmol/L Final     BUN   Date Value Ref Range Status   04/07/2022 25 (H) 6 - 23 mg/dL Final   04/06/2022 26 (H) 6 - 23 mg/dL Final   04/05/2022 27 (H) 6 - 23 mg/dL Final     CREATININE   Date Value Ref Range Status   04/07/2022 0.9 0.7 - 1.2 mg/dL Final   04/06/2022 1.0 0.7 - 1.2 mg/dL Final   04/05/2022 1.1 0.7 - 1.2 mg/dL Final     Glucose   Date Value Ref Range Status   04/07/2022 151 (H) 74 - 99 mg/dL Final   04/06/2022 247 (H) 74 - 99 mg/dL Final   04/05/2022 123 (H) 74 - 99 mg/dL Final     Calcium   Date Value Ref Range Status   04/07/2022 7.4 (L) 8.6 - 10.2 mg/dL Final   04/06/2022 7.4 (L) 8.6 - 10.2 mg/dL Final   04/05/2022 8.5 (L) 8.6 - 10.2 mg/dL Final     Total Protein   Date Value Ref Range Status   04/07/2022 5.0 (L) 6.4 - 8.3 g/dL Final   04/06/2022 4.8 (L) 6.4 - 8.3 g/dL Final   04/05/2022 6.2 (L) 6.4 - 8.3 g/dL Final     Albumin   Date Value Ref Range Status   04/07/2022 2.6 (L) 3.5 - 5.2 g/dL Final   04/06/2022 2.6 (L) 3.5 - 5.2 g/dL Final   04/05/2022 3.0 (L) 3.5 - 5.2 g/dL Final     Total Bilirubin   Date Value Ref Range Status   04/07/2022 0.3 0.0 - 1.2 mg/dL Final   04/06/2022 0.5 0.0 - 1.2 mg/dL Final   04/05/2022 0.9 0.0 - 1.2 mg/dL Final     Alkaline Phosphatase   Date Value Ref Range Status   04/07/2022 57 40 - 129 U/L Final   04/06/2022 50 40 - 129 U/L Final   04/05/2022 76 40 - 129 U/L Final     AST   Date Value Ref Range Status   04/07/2022 37 0 - 39 U/L Final   04/06/2022 19 0 - 39 U/L Final   04/05/2022 44 (H) 0 - 39 U/L Final     ALT   Date Value Ref Range Status   04/07/2022 36 0 - 40 U/L Final   04/06/2022 34 0 - 40 U/L Final   04/05/2022 69 (H) 0 - 40 U/L Final     GFR Non-   Date Value Ref Range Status   04/07/2022 >60 >=60 mL/min/1.73 Final     Comment:     Chronic Kidney Disease: less than 60 ml/min/1.73 sq.m. Kidney Failure: less than 15 ml/min/1.73 sq.m. Results valid for patients 18 years and older. 04/06/2022 >60 >=60 mL/min/1.73 Final     Comment:     Chronic Kidney Disease: less than 60 ml/min/1.73 sq.m. Kidney Failure: less than 15 ml/min/1.73 sq.m. Results valid for patients 18 years and older. 04/05/2022 >60 >=60 mL/min/1.73 Final     Comment:     Chronic Kidney Disease: less than 60 ml/min/1.73 sq.m. Kidney Failure: less than 15 ml/min/1.73 sq.m. Results valid for patients 18 years and older. GFR    Date Value Ref Range Status   04/07/2022 >60  Final   04/06/2022 >60  Final   04/05/2022 >60  Final     Magnesium   Date Value Ref Range Status   04/07/2022 2.2 1.6 - 2.6 mg/dL Final   04/06/2022 2.1 1.6 - 2.6 mg/dL Final   03/24/2022 2.0 1.6 - 2.6 mg/dL Final     Phosphorus   Date Value Ref Range Status   04/07/2022 2.5 2.5 - 4.5 mg/dL Final   04/06/2022 2.8 2.5 - 4.5 mg/dL Final     Recent Labs     04/07/22  0646   PH 7.326*   PO2 65.4*   PCO2 45.2*   HCO3 23.1   BE -2.9   O2SAT 91.1*       RADIOLOGY:  CT CHEST WO CONTRAST   Final Result   Cavitary left upper lobe lesion, slightly increased in size. This may be   secondary to infection or neoplastic disease.       New left upper lobe infiltrates probably secondary to pneumonia. New bilateral pleural effusions with adjacent compressive atelectasis or   pneumonia. XR CHEST PORTABLE   Final Result   Patchy left greater than right infiltrates which are similar to slightly   worsened. No other change. Continued follow-up recommended. US DUP LOWER EXTREMITIES BILATERAL VENOUS   Final Result   No evidence of DVT in either lower extremity. RECOMMENDATIONS:   Unavailable         XR CHEST PORTABLE   Final Result   No interval change         XR CHEST PORTABLE   Final Result   Positioning of tubes as described. Tip of an NG tube is not visualized. .         XR CHEST PORTABLE   Final Result   1. Vague pulmonary lesion within the left upper lobe medially which   represents a small cavitary lesion noted on the prior CT scan of 03/31/2022   2. Emphysematous changes with interstitial fibrosis   3. There is no appreciable superimposed pneumonia. XR ABDOMEN (KUB) (SINGLE AP VIEW)   Final Result   Nonspecific abdomen. XR CHEST PORTABLE   Final Result   1. Persistent rounded opacity within the left upper lobe measuring   approximately 2.5 cm most consistent with a partially cavitary lesion noted   on the patient's prior CT scan of 03/31/2022. This finding is unchanged. 2. Emphysematous changes with interstitial fibrosis. US RETROPERITONEAL COMPLETE   Final Result   1. Bilateral renal cortical thinning. Otherwise normal appearance of the   kidneys. No hydronephrosis. 2.  Normal appearance of the imaged bladder. XR CHEST PORTABLE   Final Result   1. No signs of an acute cardiopulmonary process   2. Coarsened interstitial markings compatible pulmonary fibrotic disease         CTA CHEST W CONTRAST   Final Result   No evidence of pulmonary embolism. Left apical cavitary lesion, new since the prior examination.   Differential   includes infection/cavitating pneumonia including tuberculosis, particularly given apical location. Underlying malignancy is not excluded. Clinical   correlation recommended. XR CHEST PORTABLE    (Results Pending)           PROBLEM LIST:  Principal Problem:    Acute respiratory failure with hypoxia (HCC)  Resolved Problems:    * No resolved hospital problems. *      IMPRESSION:  1. Acute hypoxemic and hypercapnic resp failure  2. Gm negative pneumonia  3. Severe copd with extensive bullous disease  4. bilat lower lobe consolidation  5. PHILIP cavitation vs. Infected bleb    PLAN:  1. Cont current antibiotic regimen  2. Cont tube feedings  3. Begin prone positioning  4. Await culture results  5. Gradually taper steroids  6. Proceed w bronchoscopy if A-a gradient improves enough    ATTESTATION:  ICU Staff Physician note of personal involvement in Care  As the attending physician, I certify that I personally reviewed the patients history and personally examined the patient to confirm the physical findings described above,  And that I reviewed the relevant imaging studies and available reports. I also discussed the differential diagnosis and all of the proposed management plans with the patient and individuals accompanying the patient to this visit. They had the opportunity to ask questions about the proposed management plans and to have those questions answered. This patient has a high probability of sudden, clinically significant deterioration, which requires the highest level of physician preparedness to intervene urgently. I managed/supervised life or organ supporting interventions that required frequent physician assessment. I devoted my full attention to the direct care of this patient for the amount of time indicated below.   Time I spent with the family or surrogate(s) is included only if the patient was incapable of providing the necessary information or participating in medical decisions  Time devoted to teaching and to any procedures I billed separately is not included.     CRITICAL CARE TIME:  37 minutes    Electronically signed by Hermilo Shelton MD on 4/7/2022 at 11:28 AM

## 2022-04-08 NOTE — PROGRESS NOTES
Department of Internal Medicine        CHIEF COMPLAINT: Shortness of breath    Reason for Admission: Left apical cavitary pneumonia    HISTORY OF PRESENT ILLNESS:      The patient is a 79 y.o. male who presents with being discharged back on 327 with the patient having a history of being admitted 3/23 with symptoms of shortness of breath for 2 weeks prior to that. Patient was so weak that he was not able to get out of bed 4 days before admission. Patient was tested positive for Covid with the patient never being vaccinated. Patient D-dimer was 534 on that admission. Patient white count was normal with patient having history of chronic hypoxic respiratory failure normally on 3 L nasal cannula from COPD. Patient stated he was feeling back to normal and adamantly request to be discharged home with his wife at the bedside. Patient's white count was normal and on 4 L nasal cannula at rest his O2 sat was 94-97%. Patient did need to increase his O2 to 6 L with activity. Patient stated that he was feeling better but progressively got more short of breath and came to the ED with a CAT scan showing new apical cavitary lesion since prior examination. Chest x-ray back on 3/23 showed a ill-defined patchy medial left upper lobe infiltrate. Patient currently on 15 L high flow nasal cannula with O2 sat 93%. Case discussed with infectious disease. Patient's wife is at the bedside and case discussed. 4/1/2022  Patient seen examined on [address]. Patient states he feels fairly good today and denies any pain. Patient very very short of breath with any activity. BUN/creatinine 39/1.3 today with improvement from yesterday. WBC 11.0 with a hemoglobin of 13.4. Temperature is 97.9 with heart rate 75 blood pressure 99/58. O2 sat is 90% on heated high flow nasal cannula with FiO2 75% patient viral respiratory panel is positive again for COVID-19. Infectious disease, pulmonary note reviewed.     4/2/2022   Patient seen examined on [facility]. Patient wife is at the bedside and case discussed. Patient sitting up in chair currently. Patient states he feels a little bit better. Patient denies any chest or abdominal pain. He still has a nonproductive cough. Patient is using vest therapy and is tolerating it fairly well. BUN/creatinine 32/1.1 today with CRP improved 2.2. WBC 11.7 hemoglobin 12.8. Temperature 97.6 with heart rate of 56 and blood pressure 102/60. O2 sat 94% on heated high flow nasal cannula with FiO2 of 60%. Urine output is good. 4/3/2022  Patient seen examined on [facility]. Patient's wife and patient's nurse at the bedside and case discussed. Patient lying in bed today with patient having increasing shortness of breath early this morning with the patient O2 saturation decreasing at that time and currently the patient's 100% FiO2 heated high flow nasal cannula. He denies any chest or abdominal pain. He states he otherwise feels okay except for increasing shortness of breath with any activity which includes eating and talking. BUN/creatinine 30/1.1. Liver enzymes normal with a WBC 12.3 and hemoglobin 12.5. D-dimer yesterday was only 630. Temperature 97.8 with a heart rate of 58 blood pressure 116/67. Urinary output ranges 300-500 cc a shift. We will give dietary supplements 4 times a day so the patient does not have to use a much energy eating his meals. Change the chest vest to 4 times a day between 8 AM and 10 PM with the patient refusing to do it at 5 AM in the morning. 4/4/2022  Patient seen examined on [facility]. Patient's wife and son is at the bedside case discussed. Patient denies any problem with any chest pain, abdominal pain, nausea/vomiting, dizziness. Patient complains of shortness of breath with any activity including eating and talking for extended periods of time. Patient with episode of gross hematuria yesterday afternoon. Urology was consulted. BUN/creatinine 27/1.1.   Liver enzymes are normal with WBC 15 and a hemoglobin 12.9. D-dimer is 628. Urinalysis has large amount of blood. Urology, ID notes reviewed. Pending pulmonology evaluation today but nurse practitioner note from earlier was reviewed. 4/5/2022  Patient seen examined in the ICU. Patient was transferred to the ICU yesterday afternoon with the patient having some increased dyspnea. Patient was intubated today. Patient wife is at the bedside and case discussed. BUN/creatinine 27/1.1 with normal electrolytes with repeat procalcitonin 0.11. There is mild elevation of transaminase today compared to yesterday's. WBC 15.8 with hemoglobin 14.4. Temperature is 97.799.1, heart rate 85, blood pressure 148/83 with O2 sat 97% with patient on heated high flow nasal cannula with FiO2 100%. Patient's gross hematuria has improved and notes very very mild pink alternating with normal urine appearance. Patient blood pressure over the last 24 hours is increasing I will restart patient's lisinopril. Change IV fluids with serum potassium 5.0 normal saline at 75 cc an hour. 4/6/2022  Patient seen examined  in the ICU. Case discussed with patient's nurse. Patient seems to be doing about the same. BUN/creatinine was 26/1.0. Fasting blood sugar was 247 today. Procalcitonin 0.11 yesterday. WBC 9.6 hemoglobin 10.3 with normal liver enzymes. Patient was still positive for COVID-19 infection this morning. Temperature 97.2 with heart rate of 53 and blood pressure 93/48. O2 sat 98% with the patient on assist control ventilator with a rate of 14 and 8 of PEEP and FiO2 80%. Patient is having good urine output to the IV Lasix. 4/7/2022  Patient seen examined in the ICU. Case discussed with patient's wife at bedside. He has remained patient today alert and oriented BUN/creatinine 25/0.9. Liver enzymes normal with a WBC 10.3 and hemoglobin 10.7.   Temperature 97 degrees with heart rate of 70 blood pressure 99/55 with patient's O2 saturation is 95% with patient on pressure support ventilator with 8 of PEEP and with FiO2 of 75%. CT chest showed cavitary left upper lobe lesion slightly increased in size. There is new left upper lobe infiltrate secondary to pneumonia. Bilateral pleural effusions. 4/8/2022  Patient seen examined on ICU. Case discussed with patient's nurse. The patient would seem to be doing little bit better yesterday now has gotten worse again. BUN/creatinine 27/0.9. Mild elevation of transaminase with WBC 16.9 hemoglobin 12. 1. D-dimer is greater than 5000 today. Sputum culture positive for stenotrophomonas. Temperature is 98.2 with heart rate in 95 blood pressure 118/61. O2 sat 93% on assist control ventilator with FiO2 90% with a rate of 18 and 10 of PEEP. Urine output is fairly good. Past Medical History:    Past Medical History:   Diagnosis Date    COPD (chronic obstructive pulmonary disease) ([facility] [address]     Hypertension     Oxygen dependent     2 l doesnt use continuous     Past Surgical History:    Past Surgical History:   Procedure Laterality Date    HERNIA REPAIR      age 61   [place] SHOULDER ARTHROSCOPY Right 9/9/2020    RIGHT SHOULDER ARTHROSCOPY, SUBACROMIAL DECOMPRESSION,  DEBRIDEMENT LABRIUM AND ROTATOR CUFF, CHONDROPLASTY (ARTHREX) performed by Cristina Cardoso DO at [address]       Medications Prior to Admission:    @  Prior to Admission medications    Medication Sig Start Date End Date Taking?  Authorizing Provider   ipratropium-albuterol (DUONEB) 0.5-2.5 (3) MG/3ML SOLN nebulizer solution  11/16/21   Historical Provider, MD   zinc sulfate (ZINCATE) 220 (50 Zn) MG capsule Take 1 capsule by mouth daily  Patient taking differently: Take 100 mg by mouth daily  3/26/22   Latrice Callejas DO   ascorbic acid (VITAMIN C) 1000 MG tablet Take 1 tablet by mouth daily 3/26/22   Latrice Callejas DO   budesonide (PULMICORT) 0.5 MG/2ML nebulizer suspension inhale 1 vial via nebulizer twice daily 12/16/21   Historical Provider, MD formoterol (PERFOROMIST) 20 MCG/2ML nebulizer solution inhale 1 unit dose via nebulizer twice daily 21   Historical Provider, MD   lisinopril-hydroCHLOROthiazide (PRINZIDE;ZESTORETIC) 20-12.5 MG per tablet TAKE ONE TABLET BY MOUTH DAILY 21   Rajesh Flores DO   omeprazole (PRILOSEC) 20 MG delayed release capsule TAKE ONE CAPSULE BY MOUTH EVERY MORNING 21   Historical Provider, MD   fluticasone [facility]) 50 MCG/ACT nasal spray 2 sprays by Each Nostril route daily 20   Carol Valdez APRN - CNP   OXYGEN Inhale 2 L into the lungs intermittent    Historical Provider, MD   Cyanocobalamin (VITAMIN B 12 PO) Take 500 mcg by mouth daily     Historical Provider, MD   Cholecalciferol (VITAMIN D3) 50 MCG (2000) TABS Take 2 tablets by mouth daily    Historical Provider, MD   albuterol sulfate  (90 Base) MCG/ACT inhaler Inhale 2 puffs into the lungs 4 times daily as needed for Wheezing 19   Rajesh Flores DO       Allergies:  Penicillins    Social History:   Social History     Socioeconomic History    Marital status:      Spouse name: Not on file    Number of children: Not on file    Years of education: Not on file    Highest education level: Not on file   Occupational History    Not on file   Tobacco Use    Smoking status: Former Smoker     Packs/day: 1.00     Years: 60.00     Pack years: 60.00     Quit date: 2016     Years since quittin.3    Smokeless tobacco: Never Used   Vaping Use    Vaping Use: Never used   Substance and Sexual Activity    Alcohol use: No    Drug use: No    Sexual activity: Not on file   Other Topics Concern    Not on file   Social History Narrative    Not on file     Social Determinants of Health     Financial Resource Strain: Low Risk     Difficulty of Paying Living Expenses: Not hard at all   Food Insecurity: No Food Insecurity    Worried About [address] in the Last Year: Never true    [address] in the Last Year: Never true   Transportation Needs: No Transportation Needs    Lack of Transportation (Medical): No    Lack of Transportation (Non-Medical): No   Physical Activity:     Days of Exercise per Week: Not on file    Minutes of Exercise per Session: Not on file   Stress:     Feeling of Stress : Not on file   Social Connections:     Frequency of Communication with Friends and Family: Not on file    Frequency of Social Gatherings with Friends and Family: Not on file    Attends Taoist Services: Not on file    Active Member of [address] [place] [address] or Organizations: Not on file    Attends Club or Organization Meetings: Not on file    Marital Status: Not on file   Intimate Partner Violence:     Fear of Current or Ex-Partner: Not on file    Emotionally Abused: Not on file    Physically Abused: Not on file    Sexually Abused: Not on file   Housing Stability:     Unable to Pay for Housing in the Last Year: Not on file    Number of [place] in the Last Year: Not on file    Unstable Housing in the Last Year: Not on file       Family History:   History reviewed. No pertinent family history. REVIEW OF SYSTEMS:    Gen: Patient denies any lightheadedness or dizziness. No LOC or syncope. No fevers or chills. HEENT: No earache, sore throat or nasal congestion. Resp: Denies cough, hemoptysis or sputum production. Cardiac: Denies chest pain,+ SOB,no diaphoresis or palpitations. GI: No nausea, vomiting, diarrhea or constipation. No melena or hematochezia. : No urinary complaints, dysuria, hematuria or frequency. MSK: No extremity weakness, paralysis or paresthesias.      PHYSICAL EXAM:    Vitals:  /61   Pulse 95   Temp 98.2 °F (36.8 °C) (Axillary)   Resp 19   Ht 5' 11\" (1.803 m)   Wt 171 lb 1.2 oz (77.6 kg)   SpO2 93%   BMI 23.86 kg/m²     General:  This is a 79 y.o. yo male who is alert and oriented in moderate respiratory distress  HEENT:  Head is normocephalic and atraumatic, PERRLA, EOMI, mucus membranes moist with no pharyngeal erythema or exudate. Neck:  Supple with no carotid bruits, JVD or thyromegaly.   No cervical adenopathy  CV:  Regular rate and rhythm, no murmurs  Lungs: Coarse decreased breath sounds to auscultation bilaterally with scattered crackles and no wheezes or rhonchi  Abdomen:  Soft, nontender, nondistended, bowel sounds present  Extremities:  No edema, peripheral pulses intact bilaterally  Neuro:  Cranial nerves II-XII grossly intact; motor and sensory function intact with no focal deficits  Skin:  No rashes, lesions or wounds    DATA:  CBC with Differential:    Lab Results   Component Value Date    WBC 16.9 04/08/2022    RBC 3.84 04/08/2022    HGB 12.1 04/08/2022    HCT 38.9 04/08/2022     04/08/2022    .3 04/08/2022    MCH 31.5 04/08/2022    MCHC 31.1 04/08/2022    RDW 15.7 04/08/2022    LYMPHOPCT 1.8 04/08/2022    MONOPCT 0.9 04/08/2022    BASOPCT 0.2 04/08/2022    MONOSABS 0.17 04/08/2022    LYMPHSABS 0.34 04/08/2022    EOSABS 0.00 04/08/2022    BASOSABS 0.00 04/08/2022     CMP:    Lab Results   Component Value Date     04/08/2022    K 5.0 04/08/2022     04/08/2022    CO2 25 04/08/2022    BUN 27 04/08/2022    CREATININE 0.9 04/08/2022    GFRAA >60 04/08/2022    LABGLOM >60 04/08/2022    GLUCOSE 121 04/08/2022    PROT 4.9 04/08/2022    LABALBU 2.4 04/08/2022    CALCIUM 7.6 04/08/2022    BILITOT 0.4 04/08/2022    ALKPHOS 72 04/08/2022    AST 47 04/08/2022    ALT 63 04/08/2022     Magnesium:    Lab Results   Component Value Date    MG 2.2 04/08/2022     Phosphorus:    Lab Results   Component Value Date    PHOS 3.3 04/08/2022     PT/INR:    Lab Results   Component Value Date    PROTIME 13.4 04/01/2022    INR 1.2 04/01/2022     Troponin:  No results found for: TROPONINI  U/A:    Lab Results   Component Value Date    COLORU RED 04/03/2022    PROTEINU TRACE 04/03/2022    PHUR 5.5 04/03/2022    [address] NONE 04/03/2022    RBCUA PACKED 04/03/2022 BACTERIA NONE SEEN 04/03/2022    CLARITYU CLOUDY 04/03/2022    SPECGRAV 1.020 04/03/2022    LEUKOCYTESUR Negative 04/03/2022    UROBILINOGEN 0.2 04/03/2022    BILIRUBINUR Negative 04/03/2022    BLOODU LARGE 04/03/2022    GLUCOSEU Negative 04/03/2022     ABG:    Lab Results   Component Value Date    PH 7.268 04/08/2022    PCO2 55.5 04/08/2022    PO2 77.9 04/08/2022    HCO3 24.8 04/08/2022    BE -2.9 04/08/2022    O2SAT 94.0 04/08/2022     HgBA1c:    Lab Results   Component Value Date    LABA1C 5.5 12/01/2021     FLP:    Lab Results   Component Value Date    TRIG 96 04/08/2022    HDL 45 03/24/2022    LDLCALC 110 03/24/2022    LABVLDL 13 03/24/2022     TSH:    Lab Results   Component Value Date    TSH 1.070 03/24/2022     IRON:  No results found for: IRON  LIPASE:  No results found for: LIPASE    ASSESSMENT AND PLAN:      Patient Active Problem List    Diagnosis Date Noted    Acute respiratory failure with hypoxia ([facility] [address] 03/31/2022    COPD exacerbation ([facility] [address] 03/23/2022    COVID-19 03/23/2022    Chronic bronchitis ([facility] [address] 04/20/2021    Shoulder impingement, right 08/17/2020    Nontraumatic complete tear of right rotator cuff 08/17/2020     Impression:  1. Acute on chronic hypoxic respiratory failure  2. Left upper lobe pneumonia-gram-negative  3. History of COVID-19 infection March 23, repeat PCR was positive on 3/31  4. History of severe COPD  5. History hypertension-with hypotension on admission  6. Acute kidney injury-resolved  7. History of chronic kidney disease stage 23a  8. Oral thrush  9.   Gross hematuria    Plan:  Admit to [facility]  Home medications reviewed  Monitor heart rate, blood pressure, O2 saturation  Consult ID  Consult pulmonology  Lovenox 40 mg subcu daily  General diet  Activity as tolerated  Heated high flow nasal cannula  IV fluids normal saline 20 KCl at 75 cc an hour    Hold Prinzide  IV vancomycin  IV Eraxis  IV Merrem  Decadron 6 mg IV push every 12 hours  Incentive spirometry with flutter valve every hour while awake  Chest vest percussion 4 times daily-8 AM10 PM  High-protein dietary supplements 4 times a day while patient is unable to eat his meals because of his shortness of breath. Transfer to ICU 4/4  Intubated 4/5    Baricitinib 4 mg p.o. daily-4/6  Stop lisinopril 5 mg p.o. daily  Change IV fluids normal saline 75 cc an hour    Prognosis guarded    CMP, CBC in a.m.     Rosie Poe DO, D.O.  4/8/2022  10:18 AM

## 2022-04-08 NOTE — PLAN OF CARE
Problem: Airway Clearance - Ineffective  Goal: Achieve or maintain patent airway  Outcome: Met This Shift     Problem: Non-Violent Restraints  Goal: No harm/injury to patient while restraints in use  4/8/2022 0019 by Eugene Jaramillo RN  Outcome: Met This Shift     Problem: Non-Violent Restraints  Goal: Patient's dignity will be maintained  4/8/2022 0019 by Eugene Jaramillo RN  Outcome: Met This Shift     Problem: Non-Violent Restraints  Goal: Removal from restraints as soon as assessed to be safe  4/8/2022 0019 by Eugene Jaramillo, RN  Outcome: Not Met This Shift

## 2022-04-08 NOTE — PROGRESS NOTES
04/08/22 0100    propofol 50 mcg/kg/min (04/08/22 0132)    sodium chloride 25 mL (04/07/22 1638)    [Held by provider] propofol 50 mcg/kg/min (04/06/22 1823)    fentaNYL 5 mcg/ml in 0.9%  ml infusion 125 mcg/hr (04/08/22 0600)    dexmedetomidine HCl in NaCl Stopped (04/05/22 1322)    sodium chloride Stopped (04/08/22 0405)     fentanNYL, sodium chloride flush, sodium chloride, heparin flush, prochlorperazine, acetaminophen      REVIEW OF SYSTEMS:  The patient is sedated and intubated and cannot answer questions    OBJECTIVE:  Vitals:    04/08/22 0800   BP: 118/61   Pulse: 95   Resp: 19   Temp: 98.2 °F (36.8 °C)   SpO2: 93%     FiO2 : 90 %  O2 Flow Rate (L/min): 60 L/min  O2 Device: Ventilator    PHYSICAL EXAM:  Constitutional: No fever, chills, diaphoresis  Skin: No skin rash, no skin breakdown  HEENT: Endotracheal tube secured at lips  Neck: No JVD, lymphadenopathy, thyromegaly  Cardiovascular: S1, S2 regular. No S3 murmurs rubs present  Respiratory: Inspiratory crackles over both posterior lung fields.   Diffuse soft wheezes also present posteriorly  Gastrointestinal: Soft, flat, nontender  Genitourinary: No grossly bloody urine  Extremities: No clubbing, cyanosis, or edema  Neurological: Sedated  Psychological: Sedated    LABS:  WBC   Date Value Ref Range Status   04/08/2022 16.9 (H) 4.5 - 11.5 E9/L Final   04/07/2022 10.3 4.5 - 11.5 E9/L Final   04/06/2022 9.6 4.5 - 11.5 E9/L Final     Hemoglobin   Date Value Ref Range Status   04/08/2022 12.1 (L) 12.5 - 16.5 g/dL Final   04/07/2022 10.7 (L) 12.5 - 16.5 g/dL Final   04/06/2022 10.3 (L) 12.5 - 16.5 g/dL Final     Hematocrit   Date Value Ref Range Status   04/08/2022 38.9 37.0 - 54.0 % Final   04/07/2022 34.0 (L) 37.0 - 54.0 % Final   04/06/2022 32.1 (L) 37.0 - 54.0 % Final     MCV   Date Value Ref Range Status   04/08/2022 101.3 (H) 80.0 - 99.9 fL Final   04/07/2022 100.3 (H) 80.0 - 99.9 fL Final   04/06/2022 98.5 80.0 - 99.9 fL Final     Platelets Date Value Ref Range Status   04/08/2022 173 130 - 450 E9/L Final   04/07/2022 173 130 - 450 E9/L Final   04/06/2022 188 130 - 450 E9/L Final     Sodium   Date Value Ref Range Status   04/08/2022 141 132 - 146 mmol/L Final   04/07/2022 140 132 - 146 mmol/L Final   04/06/2022 137 132 - 146 mmol/L Final     Potassium   Date Value Ref Range Status   04/08/2022 5.0 3.5 - 5.0 mmol/L Final   04/07/2022 4.0 3.5 - 5.0 mmol/L Final   04/06/2022 4.2 3.5 - 5.0 mmol/L Final     Chloride   Date Value Ref Range Status   04/08/2022 110 (H) 98 - 107 mmol/L Final   04/07/2022 111 (H) 98 - 107 mmol/L Final   04/06/2022 107 98 - 107 mmol/L Final     CO2   Date Value Ref Range Status   04/08/2022 25 22 - 29 mmol/L Final   04/07/2022 22 22 - 29 mmol/L Final   04/06/2022 24 22 - 29 mmol/L Final     BUN   Date Value Ref Range Status   04/08/2022 27 (H) 6 - 23 mg/dL Final   04/07/2022 25 (H) 6 - 23 mg/dL Final   04/06/2022 26 (H) 6 - 23 mg/dL Final     CREATININE   Date Value Ref Range Status   04/08/2022 0.9 0.7 - 1.2 mg/dL Final   04/07/2022 0.9 0.7 - 1.2 mg/dL Final   04/06/2022 1.0 0.7 - 1.2 mg/dL Final     Glucose   Date Value Ref Range Status   04/08/2022 121 (H) 74 - 99 mg/dL Final   04/07/2022 151 (H) 74 - 99 mg/dL Final   04/06/2022 247 (H) 74 - 99 mg/dL Final     Calcium   Date Value Ref Range Status   04/08/2022 7.6 (L) 8.6 - 10.2 mg/dL Final   04/07/2022 7.4 (L) 8.6 - 10.2 mg/dL Final   04/06/2022 7.4 (L) 8.6 - 10.2 mg/dL Final     Total Protein   Date Value Ref Range Status   04/08/2022 4.9 (L) 6.4 - 8.3 g/dL Final   04/07/2022 5.0 (L) 6.4 - 8.3 g/dL Final   04/06/2022 4.8 (L) 6.4 - 8.3 g/dL Final     Albumin   Date Value Ref Range Status   04/08/2022 2.4 (L) 3.5 - 5.2 g/dL Final   04/07/2022 2.6 (L) 3.5 - 5.2 g/dL Final   04/06/2022 2.6 (L) 3.5 - 5.2 g/dL Final     Total Bilirubin   Date Value Ref Range Status   04/08/2022 0.4 0.0 - 1.2 mg/dL Final   04/07/2022 0.3 0.0 - 1.2 mg/dL Final   04/06/2022 0.5 0.0 - 1.2 mg/dL Final     Alkaline Phosphatase   Date Value Ref Range Status   04/08/2022 72 40 - 129 U/L Final   04/07/2022 57 40 - 129 U/L Final   04/06/2022 50 40 - 129 U/L Final     AST   Date Value Ref Range Status   04/08/2022 47 (H) 0 - 39 U/L Final   04/07/2022 37 0 - 39 U/L Final   04/06/2022 19 0 - 39 U/L Final     ALT   Date Value Ref Range Status   04/08/2022 63 (H) 0 - 40 U/L Final   04/07/2022 36 0 - 40 U/L Final   04/06/2022 34 0 - 40 U/L Final     GFR Non-   Date Value Ref Range Status   04/08/2022 >60 >=60 mL/min/1.73 Final     Comment:     Chronic Kidney Disease: less than 60 ml/min/1.73 sq.m. Kidney Failure: less than 15 ml/min/1.73 sq.m. Results valid for patients 18 years and older. 04/07/2022 >60 >=60 mL/min/1.73 Final     Comment:     Chronic Kidney Disease: less than 60 ml/min/1.73 sq.m. Kidney Failure: less than 15 ml/min/1.73 sq.m. Results valid for patients 18 years and older. 04/06/2022 >60 >=60 mL/min/1.73 Final     Comment:     Chronic Kidney Disease: less than 60 ml/min/1.73 sq.m. Kidney Failure: less than 15 ml/min/1.73 sq.m. Results valid for patients 18 years and older. GFR    Date Value Ref Range Status   04/08/2022 >60  Final   04/07/2022 >60  Final   04/06/2022 >60  Final     Magnesium   Date Value Ref Range Status   04/08/2022 2.2 1.6 - 2.6 mg/dL Final   04/07/2022 2.2 1.6 - 2.6 mg/dL Final   04/06/2022 2.1 1.6 - 2.6 mg/dL Final     Phosphorus   Date Value Ref Range Status   04/08/2022 3.3 2.5 - 4.5 mg/dL Final   04/07/2022 2.5 2.5 - 4.5 mg/dL Final   04/06/2022 2.8 2.5 - 4.5 mg/dL Final     Recent Labs     04/08/22  0531   PH 7.268*   PO2 77.9   PCO2 55.5*   HCO3 24.8   BE -2.9   O2SAT 94.0       RADIOLOGY:  CT CHEST WO CONTRAST   Final Result   Cavitary left upper lobe lesion, slightly increased in size. This may be   secondary to infection or neoplastic disease.       New left upper lobe infiltrates probably secondary to pneumonia. New bilateral pleural effusions with adjacent compressive atelectasis or   pneumonia. XR CHEST PORTABLE   Final Result   Patchy left greater than right infiltrates which are similar to slightly   worsened. No other change. Continued follow-up recommended. US DUP LOWER EXTREMITIES BILATERAL VENOUS   Final Result   No evidence of DVT in either lower extremity. RECOMMENDATIONS:   Unavailable         XR CHEST PORTABLE   Final Result   No interval change         XR CHEST PORTABLE   Final Result   Positioning of tubes as described. Tip of an NG tube is not visualized. .         XR CHEST PORTABLE   Final Result   1. Vague pulmonary lesion within the left upper lobe medially which   represents a small cavitary lesion noted on the prior CT scan of 03/31/2022   2. Emphysematous changes with interstitial fibrosis   3. There is no appreciable superimposed pneumonia. XR ABDOMEN (KUB) (SINGLE AP VIEW)   Final Result   Nonspecific abdomen. XR CHEST PORTABLE   Final Result   1. Persistent rounded opacity within the left upper lobe measuring   approximately 2.5 cm most consistent with a partially cavitary lesion noted   on the patient's prior CT scan of 03/31/2022. This finding is unchanged. 2. Emphysematous changes with interstitial fibrosis. US RETROPERITONEAL COMPLETE   Final Result   1. Bilateral renal cortical thinning. Otherwise normal appearance of the   kidneys. No hydronephrosis. 2.  Normal appearance of the imaged bladder. XR CHEST PORTABLE   Final Result   1. No signs of an acute cardiopulmonary process   2. Coarsened interstitial markings compatible pulmonary fibrotic disease         CTA CHEST W CONTRAST   Final Result   No evidence of pulmonary embolism. Left apical cavitary lesion, new since the prior examination.   Differential   includes infection/cavitating pneumonia including tuberculosis, particularly   given apical location. Underlying malignancy is not excluded. Clinical   correlation recommended. XR CHEST PORTABLE    (Results Pending)   XR CHEST PORTABLE    (Results Pending)           PROBLEM LIST:  Principal Problem:    Acute respiratory failure with hypoxia (HCC)  Resolved Problems:    * No resolved hospital problems. *      IMPRESSION:  1. Acute hypoxemic and hypercapnic respiratory failure  2. Stenotrophomonas maltophilia  3. Severe COPD with exacerbation  4. COVID-19 infection  5. Infected left upper lobe bleb or abscess  6. Previous hematuriaresolved  7. New elevation of D-dimer    PLAN:  1. Increase Lovenox to full dose 1 mg/kg every 12 hours  2. Infectious diseases started the patient on Avycaz and aztreonam for resistant Stenotrophomonas maltophilia  3. Keep in prone position for now  4. Unable to do bronchoscopy  5. Continue baricitinib  6. Continue IV steroids  7. Decrease tube feeding amount to trickle while patient is prone  8. Discuss changing CODE STATUS with wife    ATTESTATION:  ICU Staff Physician note of personal involvement in Care  As the attending physician, I certify that I personally reviewed the patients history and personally examined the patient to confirm the physical findings described above,  And that I reviewed the relevant imaging studies and available reports. I also discussed the differential diagnosis and all of the proposed management plans with the patient and individuals accompanying the patient to this visit. They had the opportunity to ask questions about the proposed management plans and to have those questions answered. This patient has a high probability of sudden, clinically significant deterioration, which requires the highest level of physician preparedness to intervene urgently. I managed/supervised life or organ supporting interventions that required frequent physician assessment.    I devoted my full attention to the direct care of this patient for the amount of time indicated below. Time I spent with the family or surrogate(s) is included only if the patient was incapable of providing the necessary information or participating in medical decisions  Time devoted to teaching and to any procedures I billed separately is not included.     CRITICAL CARE TIME:  37 minutes    Electronically signed by Marquis Eduardo MD on 4/8/2022 at 10:18 AM

## 2022-04-08 NOTE — PROGRESS NOTES
[address] [place] [address] [facility]  [facility]  Progress Note    CC: COVID   Face to face encounter   HPI  Pt from home with SOB  Pt was dx with COVID 3/23 unvaccinated  d/c with levaquin received barcitinib     Pt presented with sob/some cough  He denies f/c/n/vd/rash/itch/chest pain/gi or gu issues  Afebrile  Wbc8.8 cr1.9  bipap   CtA neg PE No evidence of pulmonary embolism. Left apical cavitary lesion, new since the prior examination.  Differential   includes infection/cavitating pneumonia including tuberculosis, particularly   given apical location.  Underlying malignancy is not excluded.  Clinical   correlation recommended.       lives with wife  Has cats  previous smoker retired  No travel outside [address]  was in [facility]   no tb exposure     SUBJECTIVE:  4/8/2022  Pt is proned  Vent     4/6/2022  Intubated sedated supine  Afebrile ac60%  Wbc9.8 cr1  4/6 covid screen +    4/5/2022  Transferred to ICU  HFNC 100%  Has cough clear phelgm   No f/c/n/v/d/    Patient is tolerating medications. No reported adverse drug reactions.            Medications:  Scheduled Meds:   ceftazidime-acibactam (AVYCAZ) infusion  2.5 g IntraVENous Q8H    aztreonam  2,000 mg IntraVENous Q8H    enoxaparin  1 mg/kg SubCUTAneous BID    pantoprazole (PROTONIX) 40 mg injection  40 mg IntraVENous Q12H    methylPREDNISolone  40 mg IntraVENous Q8H    baricitinib  4 mg Oral Daily    sodium chloride flush  5-40 mL IntraVENous 2 times per day    heparin flush  3 mL IntraVENous 2 times per day    anidulafungin  100 mg IntraVENous Q24H    vitamin B-6  50 mg Oral Daily    thiamine mononitrate  100 mg Oral Daily    vitamin E  400 Units Oral Daily    melatonin  5 mg Oral Nightly    fluticasone  2 spray Each Nostril Daily    zinc sulfate  50 mg Oral Daily    ascorbic acid  1,000 mg Oral Daily    ipratropium-albuterol  1 ampule Inhalation Q4H    budesonide  0.5 mg Nebulization BID     Continuous Infusions:   sodium chloride 75 mL/hr at 04/08/22 0100    propofol 50 mcg/kg/min (04/08/22 0132)    sodium chloride 25 mL (04/07/22 1638)    [Held by provider] propofol 50 mcg/kg/min (04/06/22 1823)    fentaNYL 5 mcg/ml in 0.9%  ml infusion 125 mcg/hr (04/08/22 0600)    dexmedetomidine HCl in NaCl Stopped (04/05/22 1322)    sodium chloride Stopped (04/08/22 0405)     PRN Meds:fentanNYL, sodium chloride flush, sodium chloride, heparin flush, prochlorperazine, acetaminophen  OBJECTIVE:  Vitals:    04/08/22 1032 04/08/22 1100 04/08/22 1200 04/08/22 1300   BP:  (!) 97/51 (!) 98/54 (!) 92/54   Pulse: 102 96 85 80   Resp: 21 12 17 17   Temp:   98.1 °F (36.7 °C)    TempSrc:   Bladder Bladder   SpO2: 94% 95% 97% 98%   Weight:       Height:            Constitutional:   proned vent   Skin: Warm and dry. Head: at/nC  Chest:  Dec bs apost   Cardiovascular: S1 and S2 are rhythmic and regular. Abdomen: dec  bowel sounds to auscultation. ngt  Extremities:  + edema.    Cns sedated  PIcc 4/5 lue  [place] cath     Laboratory and Tests Review:  Lab Results   Component Value Date    WBC 16.9 (H) 04/08/2022    WBC 10.3 04/07/2022    WBC 9.6 04/06/2022    HGB 12.1 (L) 04/08/2022    HCT 38.9 04/08/2022    .3 (H) 04/08/2022     04/08/2022     Lab Results   Component Value Date    NEUTROABS 16.39 (H) 04/08/2022    NEUTROABS 9.99 (H) 04/07/2022    NEUTROABS 9.31 (H) 04/06/2022     Lab Results   Component Value Date    CRP 2.3 (H) 04/08/2022    CRP 4.5 (H) 04/06/2022    CRP 0.6 (H) 04/04/2022     Lab Results   Component Value Date    SEDRATE 28 (H) 04/01/2022     Lab Results   Component Value Date    ALT 63 (H) 04/08/2022    AST 47 (H) 04/08/2022    ALKPHOS 72 04/08/2022    BILITOT 0.4 04/08/2022     Lab Results   Component Value Date     04/08/2022    K 5.0 04/08/2022     04/08/2022    CO2 25 04/08/2022    BUN 27 04/08/2022    CREATININE 0.9 04/08/2022    GFRAA >60 04/08/2022    LABGLOM >60 04/08/2022    GLUCOSE 121 04/08/2022    PROT 4.9 04/08/2022    LABALBU 2.4 04/08/2022    CALCIUM 7.6 04/08/2022    BILITOT 0.4 04/08/2022    ALKPHOS 72 04/08/2022    AST 47 04/08/2022    ALT 63 04/08/2022      SARS-COV-2 biomarkers    Recent Labs     04/06/22  0419 04/07/22  0404 04/08/22  0525   CRP 4.5*  --  2.3*   DDIMER 2982  --  >5250   AST 19 37 47*   ALT 34 36 63*   TRIG  --   --  96     Lab Results   Component Value Date    CHOL 168 03/24/2022    TRIG 96 04/08/2022    HDL 45 03/24/2022    LDLCALC 110 03/24/2022    LABVLDL 13 03/24/2022     Lab Results   Component Value Date/Time    VITD25 >120 (H) 03/31/2022 11:21 AM     Radiology:  Reviewed     Microbiology:   3/31/2022- blood cx- no growth to date   3/31/2022- respiratory panel- C OVID++   Strep pneumo and legionella-histo urinary ag neg     ASSESSMENT:  Leukocytosis  Stenotrophomonas maltophilia pneumonia   · COVID -19 pneumonia   · History of COPD  · Hypoxia   · Respiratory failure vent 4/6   · Cavitary lung lesion PROB COVID    T-Spot TB Test neg    Aspergillus Glacto AG Assay neg    1,3 Beta-D-Glucan indeterminate   ·   · Unvaccinated  ·   · S/p treatment with Baricitinib   · TORY BETTER     PLAN:  · Adjust atbx   ceftazidime-avibactam (AVYCAZ) 2.5 g in dextrose 5 % 100 mL IVPB, Q8H  aztreonam (AZACTAM) 2000 mg IVPB mini-bag, Q8H  enoxaparin (LOVENOX) injection 80 mg, BID  methylPREDNISolone sodium (SOLU-MEDROL) injection 40 mg, Q8H  baricitinib (OLUMIANT) tablet 4 mg, Daily  anidulafungin (ERAXIS) 100 mg in dextrose 5 % 130 mL IVPB, Q24H  ·    · Monitor labs-     Monitor respiratory status-       Imaging and labs were reviewed. Thank you for involving me in the care of Adriana Osman. Please do not hesitate to call for any questions or concerns.     Electronically signed by Delaney Peterson MD on 4/8/2022 at 1:30 PM

## 2022-04-08 NOTE — PLAN OF CARE
Problem: Falls - Risk of:  Goal: Will remain free from falls  Description: Will remain free from falls  Outcome: Met This Shift     Problem: Falls - Risk of:  Goal: Absence of physical injury  Description: Absence of physical injury  Outcome: Met This Shift     Problem:  Body Temperature -  Risk of, Imbalanced  Goal: Ability to maintain a body temperature within defined limits  Outcome: Met This Shift

## 2022-04-09 NOTE — PROGRESS NOTES
Discussed code status and patient's poor prognosis with his wife   Kirk Zhang. Code status changed to dnr-cca. She said that he has been through enough and wants to change him to a dnr-cc later today with compassionate extubation.

## 2022-04-09 NOTE — PLAN OF CARE
Problem: Falls - Risk of:  Goal: Will remain free from falls  Description: Will remain free from falls  4/9/2022 0611 by Mert Driver RN  Outcome: Met This Shift     Problem: Falls - Risk of:  Goal: Absence of physical injury  Description: Absence of physical injury  4/9/2022 0611 by Mert Driver RN  Outcome: Met This Shift     Problem: Airway Clearance - Ineffective  Goal: Achieve or maintain patent airway  Outcome: Met This Shift     Problem: Gas Exchange - Impaired  Goal: Absence of hypoxia  Outcome: Met This Shift     Problem: Body Temperature -  Risk of, Imbalanced  Goal: Will regain or maintain usual level of consciousness  Outcome: Met This Shift     Problem: Body Temperature -  Risk of, Imbalanced  Goal: Complications related to the disease process, condition or treatment will be avoided or minimized  Outcome: Met This Shift     Problem:  Body Temperature -  Risk of, Imbalanced  Goal: Ability to maintain a body temperature within defined limits  4/8/2022 1926 by Estrella Ragland RN  Outcome: Met This Shift     Problem: Non-Violent Restraints  Goal: Removal from restraints as soon as assessed to be safe  Outcome: Ongoing

## 2022-04-09 NOTE — PROGRESS NOTES
PULMONARY/ CRITICAL CARE MEDICINE FOLLOW UP    79year old person with PMH as described below admitted to hospital for management of critical COVID-19 with Stenotrophomonas pneumonia, severe COPD. --Patient's family has decided to terminally extubate patient, goals of care are Comfort Care     BP (!) 87/52   Pulse 63   Temp 95.4 °F (35.2 °C) (Bladder)   Resp 17   Ht 5' 11\" (1.803 m)   Wt 171 lb 1.2 oz (77.6 kg)   SpO2 98%   BMI 23.86 kg/m²   General:Comatose, sedated and ventilated, prone position  HEENT: Mouth with ETT  Respiratory: Lungs with decreased breath sounds bilaterally, no adventitious sounds auscultated, no accessory muscle use  CV: Regular rate, no murmurs, no JVD, 1+ leg edema  Abdomen: Soft, non tender, + bowel sounds, no lesions  Skin: Hydrated, adequate turgor, no rash, capillary refill <2 seconds  Extremities: Muscular strength: Flaccid limbs, distal pulses present  Neurology: Comatose, neck is supple, no meningitic signs present. A/P:  1) Acute on chronic hypoxemic respiratory failure secondary to Stenotrophomonas and COVID-19 pneumonia in a patient with severe COPD.   2) Possible thromboembolism  --Compasionate extubation  --Oxygen supplementation with NC  --Comfort care measures    Hypertension  --On antihypertensives     MEDICATIONS:      propofol 50 mcg/kg/min (04/09/22 0835)    [Held by provider] propofol 50 mcg/kg/min (04/06/22 1823)    fentaNYL 5 mcg/ml in 0.9%  ml infusion 125 mcg/hr (04/09/22 0145)     midazolam, glycopyrrolate, haloperidol lactate, fentanNYL, acetaminophen    OBJECTIVE:  Vitals:    04/09/22 0916   BP:    Pulse: 63   Resp: 17   Temp:    SpO2: 98%     FiO2 : 80 %  O2 Flow Rate (L/min): 60 L/min  O2 Device: Ventilator        LABS:  WBC   Date Value Ref Range Status   04/09/2022 8.8 4.5 - 11.5 E9/L Final   04/08/2022 16.9 (H) 4.5 - 11.5 E9/L Final   04/07/2022 10.3 4.5 - 11.5 E9/L Final     Hemoglobin   Date Value Ref Range Status   04/09/2022 10.5 (L) 12.5 - 16.5 g/dL Final   04/08/2022 12.1 (L) 12.5 - 16.5 g/dL Final   04/07/2022 10.7 (L) 12.5 - 16.5 g/dL Final     Hematocrit   Date Value Ref Range Status   04/09/2022 34.4 (L) 37.0 - 54.0 % Final   04/08/2022 38.9 37.0 - 54.0 % Final   04/07/2022 34.0 (L) 37.0 - 54.0 % Final     MCV   Date Value Ref Range Status   04/09/2022 103.0 (H) 80.0 - 99.9 fL Final   04/08/2022 101.3 (H) 80.0 - 99.9 fL Final   04/07/2022 100.3 (H) 80.0 - 99.9 fL Final     Platelets   Date Value Ref Range Status   04/09/2022 124 (L) 130 - 450 E9/L Corrected     Comment:     Corrected result; previously reported as 116 on 04/09/2022 at 05:36 by TERESE   04/08/2022 173 130 - 450 E9/L Final   04/07/2022 173 130 - 450 E9/L Final     Sodium   Date Value Ref Range Status   04/09/2022 140 132 - 146 mmol/L Final   04/08/2022 141 132 - 146 mmol/L Final   04/07/2022 140 132 - 146 mmol/L Final     Potassium   Date Value Ref Range Status   04/09/2022 5.0 3.5 - 5.0 mmol/L Final   04/08/2022 5.0 3.5 - 5.0 mmol/L Final   04/07/2022 4.0 3.5 - 5.0 mmol/L Final     Chloride   Date Value Ref Range Status   04/09/2022 111 (H) 98 - 107 mmol/L Final   04/08/2022 110 (H) 98 - 107 mmol/L Final   04/07/2022 111 (H) 98 - 107 mmol/L Final     CO2   Date Value Ref Range Status   04/09/2022 23 22 - 29 mmol/L Final   04/08/2022 25 22 - 29 mmol/L Final   04/07/2022 22 22 - 29 mmol/L Final     BUN   Date Value Ref Range Status   04/09/2022 31 (H) 6 - 23 mg/dL Final   04/08/2022 27 (H) 6 - 23 mg/dL Final   04/07/2022 25 (H) 6 - 23 mg/dL Final     CREATININE   Date Value Ref Range Status   04/09/2022 0.9 0.7 - 1.2 mg/dL Final   04/08/2022 0.9 0.7 - 1.2 mg/dL Final   04/07/2022 0.9 0.7 - 1.2 mg/dL Final     Glucose   Date Value Ref Range Status   04/09/2022 149 (H) 74 - 99 mg/dL Final   04/08/2022 121 (H) 74 - 99 mg/dL Final   04/07/2022 151 (H) 74 - 99 mg/dL Final     Calcium   Date Value Ref Range Status   04/09/2022 7.3 (L) 8.6 - 10.2 mg/dL Final   04/08/2022 7.6 (L) 8.6 - 10.2 mg/dL Final   04/07/2022 7.4 (L) 8.6 - 10.2 mg/dL Final     Total Protein   Date Value Ref Range Status   04/09/2022 4.1 (L) 6.4 - 8.3 g/dL Final   04/08/2022 4.9 (L) 6.4 - 8.3 g/dL Final   04/07/2022 5.0 (L) 6.4 - 8.3 g/dL Final     Albumin   Date Value Ref Range Status   04/09/2022 2.1 (L) 3.5 - 5.2 g/dL Final   04/08/2022 2.4 (L) 3.5 - 5.2 g/dL Final   04/07/2022 2.6 (L) 3.5 - 5.2 g/dL Final     Total Bilirubin   Date Value Ref Range Status   04/09/2022 0.3 0.0 - 1.2 mg/dL Final   04/08/2022 0.4 0.0 - 1.2 mg/dL Final   04/07/2022 0.3 0.0 - 1.2 mg/dL Final     Alkaline Phosphatase   Date Value Ref Range Status   04/09/2022 70 40 - 129 U/L Final   04/08/2022 72 40 - 129 U/L Final   04/07/2022 57 40 - 129 U/L Final     AST   Date Value Ref Range Status   04/09/2022 41 (H) 0 - 39 U/L Final   04/08/2022 47 (H) 0 - 39 U/L Final   04/07/2022 37 0 - 39 U/L Final     ALT   Date Value Ref Range Status   04/09/2022 59 (H) 0 - 40 U/L Final   04/08/2022 63 (H) 0 - 40 U/L Final   04/07/2022 36 0 - 40 U/L Final     GFR Non-   Date Value Ref Range Status   04/09/2022 >60 >=60 mL/min/1.73 Final     Comment:     Chronic Kidney Disease: less than 60 ml/min/1.73 sq.m. Kidney Failure: less than 15 ml/min/1.73 sq.m. Results valid for patients 18 years and older. 04/08/2022 >60 >=60 mL/min/1.73 Final     Comment:     Chronic Kidney Disease: less than 60 ml/min/1.73 sq.m. Kidney Failure: less than 15 ml/min/1.73 sq.m. Results valid for patients 18 years and older. 04/07/2022 >60 >=60 mL/min/1.73 Final     Comment:     Chronic Kidney Disease: less than 60 ml/min/1.73 sq.m. Kidney Failure: less than 15 ml/min/1.73 sq.m. Results valid for patients 18 years and older.        GFR    Date Value Ref Range Status   04/09/2022 >60  Final   04/08/2022 >60  Final   04/07/2022 >60  Final     Magnesium   Date Value Ref Range Status   04/09/2022 2.4 1.6 - 2.6 mg/dL Final 04/08/2022 2.2 1.6 - 2.6 mg/dL Final   04/07/2022 2.2 1.6 - 2.6 mg/dL Final     Phosphorus   Date Value Ref Range Status   04/09/2022 3.5 2.5 - 4.5 mg/dL Final   04/08/2022 3.3 2.5 - 4.5 mg/dL Final   04/07/2022 2.5 2.5 - 4.5 mg/dL Final     Recent Labs     04/08/22  1119   PH 7.303*   PO2 85.7   PCO2 50.8*   HCO3 24.6   BE -2.2   O2SAT 95.5     RADIOLOGY:  CT CHEST WO CONTRAST   Final Result   Cavitary left upper lobe lesion, slightly increased in size. This may be   secondary to infection or neoplastic disease. New left upper lobe infiltrates probably secondary to pneumonia. New bilateral pleural effusions with adjacent compressive atelectasis or   pneumonia. XR CHEST PORTABLE   Final Result   Patchy left greater than right infiltrates which are similar to slightly   worsened. No other change. Continued follow-up recommended. US DUP LOWER EXTREMITIES BILATERAL VENOUS   Final Result   No evidence of DVT in either lower extremity. RECOMMENDATIONS:   Unavailable         XR CHEST PORTABLE   Final Result   No interval change         XR CHEST PORTABLE   Final Result   Positioning of tubes as described. Tip of an NG tube is not visualized. .         XR CHEST PORTABLE   Final Result   1. Vague pulmonary lesion within the left upper lobe medially which   represents a small cavitary lesion noted on the prior CT scan of 03/31/2022   2. Emphysematous changes with interstitial fibrosis   3. There is no appreciable superimposed pneumonia. XR ABDOMEN (KUB) (SINGLE AP VIEW)   Final Result   Nonspecific abdomen. XR CHEST PORTABLE   Final Result   1. Persistent rounded opacity within the left upper lobe measuring   approximately 2.5 cm most consistent with a partially cavitary lesion noted   on the patient's prior CT scan of 03/31/2022. This finding is unchanged. 2. Emphysematous changes with interstitial fibrosis. US RETROPERITONEAL COMPLETE   Final Result   1. Bilateral renal cortical thinning. Otherwise normal appearance of the   kidneys. No hydronephrosis. 2.  Normal appearance of the imaged bladder. XR CHEST PORTABLE   Final Result   1. No signs of an acute cardiopulmonary process   2. Coarsened interstitial markings compatible pulmonary fibrotic disease         CTA CHEST W CONTRAST   Final Result   No evidence of pulmonary embolism. Left apical cavitary lesion, new since the prior examination. Differential   includes infection/cavitating pneumonia including tuberculosis, particularly   given apical location. Underlying malignancy is not excluded. Clinical   correlation recommended. PROBLEM LIST:  Principal Problem:    Acute respiratory failure with hypoxia (HCC)  Resolved Problems:    * No resolved hospital problems.  *    Deangelo Cespedes MD  Pulmonary and Critical Care Medicine

## 2022-04-09 NOTE — PLAN OF CARE
Problem: Non-Violent Restraints  Goal: Removal from restraints as soon as assessed to be safe  4/9/2022 1038 by Camila Dent RN  Outcome: Completed     Problem: Non-Violent Restraints  Goal: No harm/injury to patient while restraints in use  4/9/2022 1038 by Camila Dent RN  Outcome: Completed     Problem: Non-Violent Restraints  Goal: Patient's dignity will be maintained  4/9/2022 1038 by Camila Dent RN  Outcome: Completed

## 2022-04-09 NOTE — PROGRESS NOTES
Department of Internal Medicine        CHIEF COMPLAINT: Shortness of breath    Reason for Admission: Left apical cavitary pneumonia    HISTORY OF PRESENT ILLNESS:      The patient is a 79 y.o. male who presents with being discharged back on 327 with the patient having a history of being admitted 3/23 with symptoms of shortness of breath for 2 weeks prior to that. Patient was so weak that he was not able to get out of bed 4 days before admission. Patient was tested positive for Covid with the patient never being vaccinated. Patient D-dimer was 534 on that admission. Patient white count was normal with patient having history of chronic hypoxic respiratory failure normally on 3 L nasal cannula from COPD. Patient stated he was feeling back to normal and adamantly request to be discharged home with his wife at the bedside. Patient's white count was normal and on 4 L nasal cannula at rest his O2 sat was 94-97%. Patient did need to increase his O2 to 6 L with activity. Patient stated that he was feeling better but progressively got more short of breath and came to the ED with a CAT scan showing new apical cavitary lesion since prior examination. Chest x-ray back on 3/23 showed a ill-defined patchy medial left upper lobe infiltrate. Patient currently on 15 L high flow nasal cannula with O2 sat 93%. Case discussed with infectious disease. Patient's wife is at the bedside and case discussed. 4/1/2022  Patient seen examined on [facility]. Patient states he feels fairly good today and denies any pain. Patient very very short of breath with any activity. BUN/creatinine 39/1.3 today with improvement from yesterday. WBC 11.0 with a hemoglobin of 13.4. Temperature is 97.9 with heart rate 75 blood pressure 99/58. O2 sat is 90% on heated high flow nasal cannula with FiO2 75% patient viral respiratory panel is positive again for COVID-19. Infectious disease, pulmonary note reviewed.     4/2/2022   Patient seen examined on [facility]. Patient wife is at the bedside and case discussed. Patient sitting up in chair currently. Patient states he feels a little bit better. Patient denies any chest or abdominal pain. He still has a nonproductive cough. Patient is using vest therapy and is tolerating it fairly well. BUN/creatinine 32/1.1 today with CRP improved 2.2. WBC 11.7 hemoglobin 12.8. Temperature 97.6 with heart rate of 56 and blood pressure 102/60. O2 sat 94% on heated high flow nasal cannula with FiO2 of 60%. Urine output is good. 4/3/2022  Patient seen examined on [facility]. Patient's wife and patient's nurse at the bedside and case discussed. Patient lying in bed today with patient having increasing shortness of breath early this morning with the patient O2 saturation decreasing at that time and currently the patient's 100% FiO2 heated high flow nasal cannula. He denies any chest or abdominal pain. He states he otherwise feels okay except for increasing shortness of breath with any activity which includes eating and talking. BUN/creatinine 30/1.1. Liver enzymes normal with a WBC 12.3 and hemoglobin 12.5. D-dimer yesterday was only 630. Temperature 97.8 with a heart rate of 58 blood pressure 116/67. Urinary output ranges 300-500 cc a shift. We will give dietary supplements 4 times a day so the patient does not have to use a much energy eating his meals. Change the chest vest to 4 times a day between 8 AM and 10 PM with the patient refusing to do it at 5 AM in the morning. 4/4/2022  Patient seen examined on [facility]. Patient's wife and son is at the bedside case discussed. Patient denies any problem with any chest pain, abdominal pain, nausea/vomiting, dizziness. Patient complains of shortness of breath with any activity including eating and talking for extended periods of time. Patient with episode of gross hematuria yesterday afternoon. Urology was consulted. BUN/creatinine 27/1.1.   Liver enzymes are normal with WBC 15 and a hemoglobin 12.9. D-dimer is 628. Urinalysis has large amount of blood. Urology, ID notes reviewed. Pending pulmonology evaluation today but nurse practitioner note from earlier was reviewed. 4/5/2022  Patient seen examined in the ICU. Patient was transferred to the ICU yesterday afternoon with the patient having some increased dyspnea. Patient was intubated today. Patient wife is at the bedside and case discussed. BUN/creatinine 27/1.1 with normal electrolytes with repeat procalcitonin 0.11. There is mild elevation of transaminase today compared to yesterday's. WBC 15.8 with hemoglobin 14.4. Temperature is 97.799.1, heart rate 85, blood pressure 148/83 with O2 sat 97% with patient on heated high flow nasal cannula with FiO2 100%. Patient's gross hematuria has improved and notes very very mild pink alternating with normal urine appearance. Patient blood pressure over the last 24 hours is increasing I will restart patient's lisinopril. Change IV fluids with serum potassium 5.0 normal saline at 75 cc an hour. 4/6/2022  Patient seen examined  in the ICU. Case discussed with patient's nurse. Patient seems to be doing about the same. BUN/creatinine was 26/1.0. Fasting blood sugar was 247 today. Procalcitonin 0.11 yesterday. WBC 9.6 hemoglobin 10.3 with normal liver enzymes. Patient was still positive for COVID-19 infection this morning. Temperature 97.2 with heart rate of 53 and blood pressure 93/48. O2 sat 98% with the patient on assist control ventilator with a rate of 14 and 8 of PEEP and FiO2 80%. Patient is having good urine output to the IV Lasix. 4/7/2022  Patient seen examined in the ICU. Case discussed with patient's wife at bedside. He has remained patient today alert and oriented BUN/creatinine 25/0.9. Liver enzymes normal with a WBC 10.3 and hemoglobin 10.7.   Temperature 97 degrees with heart rate of 70 blood pressure 99/55 with patient's O2 saturation is 95% with patient on pressure support ventilator with 8 of PEEP and with FiO2 of 75%. CT chest showed cavitary left upper lobe lesion slightly increased in size. There is new left upper lobe infiltrate secondary to pneumonia. Bilateral pleural effusions. 4/8/2022  Patient seen examined on ICU. Case discussed with patient's nurse. The patient would seem to be doing little bit better yesterday now has gotten worse again. BUN/creatinine 27/0.9. Mild elevation of transaminase with WBC 16.9 hemoglobin 12. 1. D-dimer is greater than 5000 today. Sputum culture positive for stenotrophomonas. Temperature is 98.2 with heart rate in 95 blood pressure 118/61. O2 sat 93% on assist control ventilator with FiO2 90% with a rate of 18 and 10 of PEEP. Urine output is fairly good. 4/9/2022  Patient seen in ICU. Patient's wife/family has decided to put the patient a DNR CC.  BUN/creatinine 31/0.9 essentially normal electrolytes. Fasting blood sugar 149. Mild elevation transaminase with WBC 8.8 hemoglobin 10.5. Platelet count has dropped down 124. Temperature 95.4 with heart rate of 63 and blood pressure currently 87/52. O2 sat 98% with the patient on assist control ventilator with rate 18 and 10 of PEEP and FiO2 80%. .  Urine output ranges 550-700 cc a shift. Patient was compassionately extubated at the request of family with the patient shortly afterwards passing away.       Past Medical History:    Past Medical History:   Diagnosis Date    COPD (chronic obstructive pulmonary disease) ([address]     Hypertension     Oxygen dependent     2 l doesnt use continuous     Past Surgical History:    Past Surgical History:   Procedure Laterality Date    HERNIA REPAIR      age 61   [facility] SHOULDER ARTHROSCOPY Right 9/9/2020    RIGHT SHOULDER ARTHROSCOPY, SUBACROMIAL DECOMPRESSION,  DEBRIDEMENT LABRIUM AND ROTATOR CUFF, CHONDROPLASTY (ARTHREX) performed by Amie Gandhi DO at [address]       Medications Prior to Admission:    @  Prior to Admission medications    Medication Sig Start Date End Date Taking?  Authorizing Provider   ipratropium-albuterol (DUONEB) 0.5-2.5 (3) MG/3ML SOLN nebulizer solution  11/16/21   Historical Provider, MD   zinc sulfate (ZINCATE) 220 (50 Zn) MG capsule Take 1 capsule by mouth daily  Patient taking differently: Take 100 mg by mouth daily  3/26/22   Oral Salvia, DO   ascorbic acid (VITAMIN C) 1000 MG tablet Take 1 tablet by mouth daily 3/26/22   Oral Salvia, DO   budesonide (PULMICORT) 0.5 MG/2ML nebulizer suspension inhale 1 vial via nebulizer twice daily 12/16/21   Historical Provider, MD   formoterol (PERFOROMIST) 20 MCG/2ML nebulizer solution inhale 1 unit dose via nebulizer twice daily 12/16/21   Historical Provider, MD   lisinopril-hydroCHLOROthiazide (PRINZIDE;ZESTORETIC) 20-12.5 MG per tablet TAKE ONE TABLET BY MOUTH DAILY 11/12/21   Harvey Bolivar DO   omeprazole (PRILOSEC) 20 MG delayed release capsule TAKE ONE CAPSULE BY MOUTH EVERY MORNING 5/27/21   Historical Provider, MD   fluticasone Delora Raegan) 50 MCG/ACT nasal spray 2 sprays by Each Nostril route daily 12/21/20   INDIGO Fowler - CNP   OXYGEN Inhale 2 L into the lungs intermittent    Historical Provider, MD   Cyanocobalamin (VITAMIN B 12 PO) Take 500 mcg by mouth daily     Historical Provider, MD   Cholecalciferol (VITAMIN D3) 50 MCG (2000 UT) TABS Take 2 tablets by mouth daily    Historical Provider, MD   albuterol sulfate  (90 Base) MCG/ACT inhaler Inhale 2 puffs into the lungs 4 times daily as needed for Wheezing 12/24/19   Harvey Bolivar DO       Allergies:  Penicillins    Social History:   Social History     Socioeconomic History    Marital status:      Spouse name: Not on file    Number of children: Not on file    Years of education: Not on file    Highest education level: Not on file   Occupational History    Not on file   Tobacco Use    Smoking status: Former Smoker     Packs/day: 1.00     Years: 60.00 Pack years: 60.00     Quit date: 2016     Years since quittin.3    Smokeless tobacco: Never Used   Vaping Use    Vaping Use: Never used   Substance and Sexual Activity    Alcohol use: No    Drug use: No    Sexual activity: Not on file   Other Topics Concern    Not on file   Social History Narrative    Not on file     Social Determinants of Health     Financial Resource Strain: Low Risk     Difficulty of Paying Living Expenses: Not hard at all   Food Insecurity: No Food Insecurity    Worried About Running Out of Food in the Last Year: Never true    Joselin of Food in the Last Year: Never true   Transportation Needs: No Transportation Needs    Lack of Transportation (Medical): No    Lack of Transportation (Non-Medical): No   Physical Activity:     Days of Exercise per Week: Not on file    Minutes of Exercise per Session: Not on file   Stress:     Feeling of Stress : Not on file   Social Connections:     Frequency of Communication with Friends and Family: Not on file    Frequency of Social Gatherings with Friends and Family: Not on file    Attends [employer] Services: Not on file    Active Member of [address] [place] [address] or Organizations: Not on file    Attends Club or Organization Meetings: Not on file    Marital Status: Not on file   Intimate Partner Violence:     Fear of Current or Ex-Partner: Not on file    Emotionally Abused: Not on file    Physically Abused: Not on file    Sexually Abused: Not on file   Housing Stability:     Unable to Pay for Housing in the Last Year: Not on file    Number of [place] in the Last Year: Not on file    Unstable Housing in the Last Year: Not on file       Impression:  1. Acute on chronic hypoxic, hypercapnic respiratory failure  2. Left upper lobe pneumonia--stenotrophomonas pneumonia  3. History of COVID-19 infection , repeat PCR was positive on 3/31-  4. History of severe COPD  5. History hypertension-with hypotension on admission  6.   Acute kidney injury-resolved  7. History of chronic kidney disease stage 2  8. Oral thrush  9.   Gross hematuria    Patient passed away 10:59 AM    Regino Patten DO, D.O.  4/9/2022  10:11 AM

## 2022-04-09 NOTE — PROGRESS NOTES
DEATH NOTE    Patient's name: Mata Andrade   Date and time of death: April 9, 2022 at 10:59  Cause of death: COVID-19 pneumonia, Stenotrophomonas pneumonia, COPD  Attending physician notified: Dr. Valencia [place] status:  DNR-CC    The patient is immobile, flaccid, with no respiratory effort, cardiac sounds absent, EKG strip with asystole.     Carlton Corado MD  Pulmonary and Critical Care Medicine

## 2022-04-09 NOTE — PROGRESS NOTES
Patient placed in 2 body bags, [place] [facility] [place] [facility] police notified

## 2022-04-09 NOTE — PROGRESS NOTES
Physician Progress Note      PATIENT:               Korin Walters  CSN #:                  [id number]  :                       1940  ADMIT DATE:       3/31/2022 1:30 AM  DISCH DATE:  RESPONDING  PROVIDER #:        Yenny Quinones DO          QUERY TEXT:    Dear Dr. Spencer Lynch,    Pt admitted with COVID-19 and noted to have elevated lactic. If possible,   please document in progress notes and discharge summary if you are evaluating   and/or treating: The medical record reflects the following:  Risk Factors: unvaccinated,  Stenotrophomonas maltophilia multidrug-resistant   pneumonia, systemic hypertension, COPD, CKD  Clinical Indicators: lactic 2.0-1.9; CRP 4.5-0.6; sed rate 28; procalcitonin   0.12-0.11; CT: Cavitary left upper lobe lesion, slightly increased in size. New bilateral pleural effusions with adjacent compressive atelectasis or   pneumonia. ; TPR: 96.1-99.1//16-40; hypotension on admission;  TORY  Treatment: IMCU/ICU monitoring, Pulmonary and ID consults, supplemental   O2/BIPAP/vent, IVF's, IV Solu Medrol, Eraxis, Azactam, Avycaz, oral vitamins   and Baricitinib, respiatory panel, blood cultures, CTA chest    Thank You,  Mishel Hampton RN, BSN, CDS  Clinical Documentation Improvement Specialist  [phone]  Options provided:  -- Sepsis present on admission due to COVID-19 infection  -- Sepsis not present on admission due to COVID-19 infection  -- Sepsis present on admission due to COVID-19 pneumonia  -- Sepsis not present on admission due to COVID-19 pneumonia  -- Covid-19 infection without sepsis  -- Covid-19 pneumonia without sepsis  -- Other - I will add my own diagnosis  -- Disagree - Not applicable / Not valid  -- Disagree - Clinically unable to determine / Unknown  -- Refer to Clinical Documentation Reviewer    PROVIDER RESPONSE TEXT:    Sepsis secondary to stenotrophomonas pneumonia and patient having underlying   COVID-19 respiratory infection    Query created by: Kailee Simeon Christian on 4/8/2022 7:25 PM      Electronically signed by:  Petty Willoughby DO 4/9/2022 12:02 PM

## 2022-04-11 NOTE — DISCHARGE SUMMARY
Department of Internal Medicine        CHIEF COMPLAINT: Shortness of breath    Reason for Admission: Left apical cavitary pneumonia    HISTORY OF PRESENT ILLNESS:      The patient is a 79 y.o. male who presents with being discharged back on 327 with the patient having a history of being admitted 3/23 with symptoms of shortness of breath for 2 weeks prior to that. Patient was so weak that he was not able to get out of bed 4 days before admission. Patient was tested positive for Covid with the patient never being vaccinated. Patient D-dimer was 534 on that admission. Patient white count was normal with patient having history of chronic hypoxic respiratory failure normally on 3 L nasal cannula from COPD. Patient stated he was feeling back to normal and adamantly request to be discharged home with his wife at the bedside. Patient's white count was normal and on 4 L nasal cannula at rest his O2 sat was 94-97%. Patient did need to increase his O2 to 6 L with activity. Patient stated that he was feeling better but progressively got more short of breath and came to the ED with a CAT scan showing new apical cavitary lesion since prior examination. Chest x-ray back on 3/23 showed a ill-defined patchy medial left upper lobe infiltrate. Patient currently on 15 L high flow nasal cannula with O2 sat 93%. Case discussed with infectious disease. Patient's wife is at the bedside and case discussed. 4/1/2022  Patient seen examined on [address]. Patient states he feels fairly good today and denies any pain. Patient very very short of breath with any activity. BUN/creatinine 39/1.3 today with improvement from yesterday. WBC 11.0 with a hemoglobin of 13.4. Temperature is 97.9 with heart rate 75 blood pressure 99/58. O2 sat is 90% on heated high flow nasal cannula with FiO2 75% patient viral respiratory panel is positive again for COVID-19. Infectious disease, pulmonary note reviewed.     4/2/2022   Patient seen examined on [facility]. Patient wife is at the bedside and case discussed. Patient sitting up in chair currently. Patient states he feels a little bit better. Patient denies any chest or abdominal pain. He still has a nonproductive cough. Patient is using vest therapy and is tolerating it fairly well. BUN/creatinine 32/1.1 today with CRP improved 2.2. WBC 11.7 hemoglobin 12.8. Temperature 97.6 with heart rate of 56 and blood pressure 102/60. O2 sat 94% on heated high flow nasal cannula with FiO2 of 60%. Urine output is good. 4/3/2022  Patient seen examined on [facility]. Patient's wife and patient's nurse at the bedside and case discussed. Patient lying in bed today with patient having increasing shortness of breath early this morning with the patient O2 saturation decreasing at that time and currently the patient's 100% FiO2 heated high flow nasal cannula. He denies any chest or abdominal pain. He states he otherwise feels okay except for increasing shortness of breath with any activity which includes eating and talking. BUN/creatinine 30/1.1. Liver enzymes normal with a WBC 12.3 and hemoglobin 12.5. D-dimer yesterday was only 630. Temperature 97.8 with a heart rate of 58 blood pressure 116/67. Urinary output ranges 300-500 cc a shift. We will give dietary supplements 4 times a day so the patient does not have to use a much energy eating his meals. Change the chest vest to 4 times a day between 8 AM and 10 PM with the patient refusing to do it at 5 AM in the morning. 4/4/2022  Patient seen examined on [facility]. Patient's wife and son is at the bedside case discussed. Patient denies any problem with any chest pain, abdominal pain, nausea/vomiting, dizziness. Patient complains of shortness of breath with any activity including eating and talking for extended periods of time. Patient with episode of gross hematuria yesterday afternoon. Urology was consulted. BUN/creatinine 27/1.1.   Liver enzymes are normal with WBC 15 and a hemoglobin 12.9. D-dimer is 628. Urinalysis has large amount of blood. Urology, ID notes reviewed. Pending pulmonology evaluation today but nurse practitioner note from earlier was reviewed. 4/5/2022  Patient seen examined in the ICU. Patient was transferred to the ICU yesterday afternoon with the patient having some increased dyspnea. Patient was intubated today. Patient wife is at the bedside and case discussed. BUN/creatinine 27/1.1 with normal electrolytes with repeat procalcitonin 0.11. There is mild elevation of transaminase today compared to yesterday's. WBC 15.8 with hemoglobin 14.4. Temperature is 97.799.1, heart rate 85, blood pressure 148/83 with O2 sat 97% with patient on heated high flow nasal cannula with FiO2 100%. Patient's gross hematuria has improved and notes very very mild pink alternating with normal urine appearance. Patient blood pressure over the last 24 hours is increasing I will restart patient's lisinopril. Change IV fluids with serum potassium 5.0 normal saline at 75 cc an hour. 4/6/2022  Patient seen examined  in the ICU. Case discussed with patient's nurse. Patient seems to be doing about the same. BUN/creatinine was 26/1.0. Fasting blood sugar was 247 today. Procalcitonin 0.11 yesterday. WBC 9.6 hemoglobin 10.3 with normal liver enzymes. Patient was still positive for COVID-19 infection this morning. Temperature 97.2 with heart rate of 53 and blood pressure 93/48. O2 sat 98% with the patient on assist control ventilator with a rate of 14 and 8 of PEEP and FiO2 80%. Patient is having good urine output to the IV Lasix. 4/7/2022  Patient seen examined in the ICU. Case discussed with patient's wife at bedside. He has remained patient today alert and oriented BUN/creatinine 25/0.9. Liver enzymes normal with a WBC 10.3 and hemoglobin 10.7.   Temperature 97 degrees with heart rate of 70 blood pressure 99/55 with patient's O2 saturation is 95% with patient on pressure support ventilator with 8 of PEEP and with FiO2 of 75%. CT chest showed cavitary left upper lobe lesion slightly increased in size. There is new left upper lobe infiltrate secondary to pneumonia. Bilateral pleural effusions. 4/8/2022  Patient seen examined on ICU. Case discussed with patient's nurse. The patient would seem to be doing little bit better yesterday now has gotten worse again. BUN/creatinine 27/0.9. Mild elevation of transaminase with WBC 16.9 hemoglobin 12. 1. D-dimer is greater than 5000 today. Sputum culture positive for stenotrophomonas. Temperature is 98.2 with heart rate in 95 blood pressure 118/61. O2 sat 93% on assist control ventilator with FiO2 90% with a rate of 18 and 10 of PEEP. Urine output is fairly good. 4/9/2022  Patient seen in ICU. Patient's wife/family has decided to put the patient a DNR CC.  BUN/creatinine 31/0.9 essentially normal electrolytes. Fasting blood sugar 149. Mild elevation transaminase with WBC 8.8 hemoglobin 10.5. Platelet count has dropped down 124. Temperature 95.4 with heart rate of 63 and blood pressure currently 87/52. O2 sat 98% with the patient on assist control ventilator with rate 18 and 10 of PEEP and FiO2 80%. .  Urine output ranges 550-700 cc a shift. Patient was compassionately extubated at the request of family with the patient shortly afterwards passing away.       Past Medical History:    Past Medical History:   Diagnosis Date    COPD (chronic obstructive pulmonary disease) ([address]     Hypertension     Oxygen dependent     2 l doesnt use continuous     Past Surgical History:    Past Surgical History:   Procedure Laterality Date    HERNIA REPAIR      age 61   Micky Loser SHOULDER ARTHROSCOPY Right 9/9/2020    RIGHT SHOULDER ARTHROSCOPY, SUBACROMIAL DECOMPRESSION,  DEBRIDEMENT LABRIUM AND ROTATOR CUFF, CHONDROPLASTY (ARTHREX) performed by Fide Ascencio DO at [id number]       Medications Prior to Admission:    @  Prior to Admission medications    Medication Sig Start Date End Date Taking?  Authorizing Provider   ipratropium-albuterol (DUONEB) 0.5-2.5 (3) MG/3ML SOLN nebulizer solution  11/16/21   Historical Provider, MD   zinc sulfate (ZINCATE) 220 (50 Zn) MG capsule Take 1 capsule by mouth daily  Patient taking differently: Take 100 mg by mouth daily  3/26/22   Yo Round, DO   ascorbic acid (VITAMIN C) 1000 MG tablet Take 1 tablet by mouth daily 3/26/22   Yo Round, DO   budesonide (PULMICORT) 0.5 MG/2ML nebulizer suspension inhale 1 vial via nebulizer twice daily 12/16/21   Historical Provider, MD   formoterol (PERFOROMIST) 20 MCG/2ML nebulizer solution inhale 1 unit dose via nebulizer twice daily 12/16/21   Historical Provider, MD   lisinopril-hydroCHLOROthiazide (PRINZIDE;ZESTORETIC) 20-12.5 MG per tablet TAKE ONE TABLET BY MOUTH DAILY 11/12/21   Tobi Parish DO   omeprazole (PRILOSEC) 20 MG delayed release capsule TAKE ONE CAPSULE BY MOUTH EVERY MORNING 5/27/21   Historical Provider, MD   fluticasone [address]) 50 MCG/ACT nasal spray 2 sprays by Each Nostril route daily 12/21/20   INDIGO Dodson - CNP   OXYGEN Inhale 2 L into the lungs intermittent    Historical Provider, MD   Cyanocobalamin (VITAMIN B 12 PO) Take 500 mcg by mouth daily     Historical Provider, MD   Cholecalciferol (VITAMIN D3) 50 MCG (2000 UT) TABS Take 2 tablets by mouth daily    Historical Provider, MD   albuterol sulfate  (90 Base) MCG/ACT inhaler Inhale 2 puffs into the lungs 4 times daily as needed for Wheezing 12/24/19   Tobi Parish DO       Allergies:  Penicillins    Social History:   Social History     Socioeconomic History    Marital status:      Spouse name: Not on file    Number of children: Not on file    Years of education: Not on file    Highest education level: Not on file   Occupational History    Not on file   Tobacco Use    Smoking status: Former Smoker     Packs/day: 1.00     Years: 60.00 Pack years: 60.00     Quit date: 2016     Years since quittin.3    Smokeless tobacco: Never Used   Vaping Use    Vaping Use: Never used   Substance and Sexual Activity    Alcohol use: No    Drug use: No    Sexual activity: Not on file   Other Topics Concern    Not on file   Social History Narrative    Not on file     Social Determinants of Health     Financial Resource Strain:     Difficulty of Paying Living Expenses: Not on file   Food Insecurity:     Worried About Running Out of Food in the Last Year: Not on file    Joselin of Food in the Last Year: Not on file   Transportation Needs:     Lack of Transportation (Medical): Not on file    Lack of Transportation (Non-Medical): Not on file   Physical Activity:     Days of Exercise per Week: Not on file    Minutes of Exercise per Session: Not on file   Stress:     Feeling of Stress : Not on file   Social Connections:     Frequency of Communication with Friends and Family: Not on file    Frequency of Social Gatherings with Friends and Family: Not on file    Attends Orthodox Services: Not on file    Active Member of [address] [place] [address] or Organizations: Not on file    Attends Club or Organization Meetings: Not on file    Marital Status: Not on file   Intimate Partner Violence:     Fear of Current or Ex-Partner: Not on file    Emotionally Abused: Not on file    Physically Abused: Not on file    Sexually Abused: Not on file   Housing Stability:     Unable to Pay for Housing in the Last Year: Not on file    Number of [place] in the Last Year: Not on file    Unstable Housing in the Last Year: Not on file       Impression:  1. Acute on chronic hypoxic, hypercapnic respiratory failure  2. Left upper lobe pneumonia--stenotrophomonas pneumonia  3. History of COVID-19 infection , repeat PCR was positive on 3/31-  4. History of severe COPD  5. History hypertension-with hypotension on admission  6. Acute kidney injury-resolved  7. History of chronic kidney disease stage 2  8. Oral thrush  9.   Gross hematuria    Patient passed away 10:59 AM    Eduardo Ramirez DO, D.O.  4/9/2022  5:37 PM

## 2023-04-28 NOTE — PROGRESS NOTES
Pharmacy Consultation Note    Consult date: 3/23/2022  Physician/provider: Dr. Chris Shook has been consulted to evaluate criteria for Baricitinib or Remdesivir therapy. Based on the algorithm, the patient DOES currently meet [facility] approved Covid-19 treatment criteria for Baricitinib.     Thank you for the consult,  Kishan Carter, [address] [facility] Will call back with what needs filled

## (undated) DEVICE — APPLICATOR MEDICATED 26 CC SOLUTION HI LT ORNG CHLORAPREP

## (undated) DEVICE — COVER,LIGHT HANDLE,FLX,1/PK: Brand: MEDLINE INDUSTRIES, INC.

## (undated) DEVICE — SYRINGE MED 50ML LUERLOCK TIP

## (undated) DEVICE — SLING ARM L L165IN D75IN WHT POLY MESH ENVELOP MTL SIDE

## (undated) DEVICE — DOUBLE BASIN SET: Brand: MEDLINE INDUSTRIES, INC.

## (undated) DEVICE — GAUZE,SPONGE,4"X4",16PLY,XRAY,STRL,LF: Brand: MEDLINE

## (undated) DEVICE — GARMENT,MEDLINE,DVT,INT,CALF,MED, GEN2: Brand: MEDLINE

## (undated) DEVICE — SOLUTION IV IRRIG LACTATED RINGERS 3000ML 2B7487

## (undated) DEVICE — PAD,ABDOMINAL,5"X9",ST,LF,25/BX: Brand: MEDLINE INDUSTRIES, INC.

## (undated) DEVICE — INTENDED FOR TISSUE SEPARATION, AND OTHER PROCEDURES THAT REQUIRE A SHARP SURGICAL BLADE TO PUNCTURE OR CUT.: Brand: BARD-PARKER ® STAINLESS STEEL BLADES

## (undated) DEVICE — GOWN,SIRUS,POLYRNF,BRTHSLV,XLN/XL,20/CS: Brand: MEDLINE

## (undated) DEVICE — 3 ML SYRINGE LUER-LOCK TIP: Brand: MONOJECT

## (undated) DEVICE — HYPODERMIC SAFETY NEEDLE: Brand: MAGELLAN

## (undated) DEVICE — CAMERA STRYKER 1488 HD GEN

## (undated) DEVICE — SUPER TURBOVAC

## (undated) DEVICE — DRESSING GZ W1XL8IN COT XRFRM N ADH OVERWRAP CURAD

## (undated) DEVICE — ELECTRODE PT RET AD L9FT HI MOIST COND ADH HYDRGEL CORDED

## (undated) DEVICE — GOWN,SIRUS,FABRNF,XL,20/CS: Brand: MEDLINE

## (undated) DEVICE — SHEET,DRAPE,40X58,STERILE: Brand: MEDLINE

## (undated) DEVICE — [AGGRESSIVE 6-FLUTE BARREL BUR, ARTHROSCOPIC SHAVER BLADE,  DO NOT RESTERILIZE,  DO NOT USE IF PACKAGE IS DAMAGED,  KEEP DRY,  KEEP AWAY FROM SUNLIGHT]: Brand: FORMULA

## (undated) DEVICE — NEEDLE SPNL L3.5IN PNK HUB S STL REG WALL FIT STYL W/ QNCKE

## (undated) DEVICE — SET SURG INSTR DISSECT

## (undated) DEVICE — MARKER,SKIN,WI/RULER AND LABELS: Brand: MEDLINE

## (undated) DEVICE — SYRINGE MED 10ML LUERLOCK TIP W/O SFTY DISP

## (undated) DEVICE — Z DISCONTINUED USE 2275686 GLOVE SURG SZ 8 L12IN FNGR THK13MIL WHT ISOLEX POLYISOPRENE

## (undated) DEVICE — SLEEVE TRAC SPANDEX LAT W/ 4IN COBAN SUPERFICIAL RAD NRV PD

## (undated) DEVICE — BLADE SAW AGRSV + CUT 4MM

## (undated) DEVICE — TUBING PMP L16FT MAIN DISP FOR AR-6400 AR-6475

## (undated) DEVICE — TOWEL,OR,DSP,ST,BLUE,STD,6/PK,12PK/CS: Brand: MEDLINE

## (undated) DEVICE — GAUZE,SPONGE,4"X4",16PLY,STRL,LF,10/TRAY: Brand: MEDLINE

## (undated) DEVICE — NDL CNTR 40CT FM MAG: Brand: MEDLINE INDUSTRIES, INC.

## (undated) DEVICE — DRAPE,SHOULDER,ORTHOMAX,W/POUCH,5/CS: Brand: MEDLINE

## (undated) DEVICE — COVER,TABLE,60X90,STERILE: Brand: MEDLINE

## (undated) DEVICE — DRAPE,SPL,SHOULD,PCH,STERILE: Brand: MEDLINE

## (undated) DEVICE — TUBING, SUCTION, 1/4" X 10', STRAIGHT: Brand: MEDLINE